# Patient Record
Sex: MALE | Race: WHITE | NOT HISPANIC OR LATINO | URBAN - METROPOLITAN AREA
[De-identification: names, ages, dates, MRNs, and addresses within clinical notes are randomized per-mention and may not be internally consistent; named-entity substitution may affect disease eponyms.]

---

## 2017-04-17 ENCOUNTER — EMERGENCY (EMERGENCY)
Facility: HOSPITAL | Age: 77
LOS: 1 days | Discharge: LEFT BEFORE DISCHARGE | End: 2017-04-17
Attending: EMERGENCY MEDICINE | Admitting: EMERGENCY MEDICINE
Payer: MEDICARE

## 2017-04-17 VITALS
RESPIRATION RATE: 16 BRPM | OXYGEN SATURATION: 97 % | WEIGHT: 197.98 LBS | TEMPERATURE: 98 F | SYSTOLIC BLOOD PRESSURE: 127 MMHG | HEART RATE: 88 BPM | DIASTOLIC BLOOD PRESSURE: 89 MMHG

## 2017-04-17 DIAGNOSIS — E78.5 HYPERLIPIDEMIA, UNSPECIFIED: ICD-10-CM

## 2017-04-17 DIAGNOSIS — R11.2 NAUSEA WITH VOMITING, UNSPECIFIED: ICD-10-CM

## 2017-04-17 DIAGNOSIS — Z79.82 LONG TERM (CURRENT) USE OF ASPIRIN: ICD-10-CM

## 2017-04-17 DIAGNOSIS — Z95.5 PRESENCE OF CORONARY ANGIOPLASTY IMPLANT AND GRAFT: Chronic | ICD-10-CM

## 2017-04-17 DIAGNOSIS — Z98.89 OTHER SPECIFIED POSTPROCEDURAL STATES: Chronic | ICD-10-CM

## 2017-04-17 DIAGNOSIS — Z79.899 OTHER LONG TERM (CURRENT) DRUG THERAPY: ICD-10-CM

## 2017-04-17 DIAGNOSIS — Z79.2 LONG TERM (CURRENT) USE OF ANTIBIOTICS: ICD-10-CM

## 2017-04-17 DIAGNOSIS — K52.9 NONINFECTIVE GASTROENTERITIS AND COLITIS, UNSPECIFIED: ICD-10-CM

## 2017-04-17 DIAGNOSIS — I25.10 ATHEROSCLEROTIC HEART DISEASE OF NATIVE CORONARY ARTERY WITHOUT ANGINA PECTORIS: ICD-10-CM

## 2017-04-17 DIAGNOSIS — E11.9 TYPE 2 DIABETES MELLITUS WITHOUT COMPLICATIONS: ICD-10-CM

## 2017-04-17 DIAGNOSIS — I10 ESSENTIAL (PRIMARY) HYPERTENSION: ICD-10-CM

## 2017-04-17 PROCEDURE — 93010 ELECTROCARDIOGRAM REPORT: CPT

## 2017-04-17 PROCEDURE — 93005 ELECTROCARDIOGRAM TRACING: CPT

## 2017-04-17 PROCEDURE — 99283 EMERGENCY DEPT VISIT LOW MDM: CPT | Mod: 25

## 2017-04-17 PROCEDURE — 99284 EMERGENCY DEPT VISIT MOD MDM: CPT | Mod: 25

## 2017-04-17 NOTE — ED PROVIDER NOTE - OBJECTIVE STATEMENT
here with dark black stool for the past 4 days.  Had episode of nausea/vomiting that was also black when symptoms started. Denies pain, fever/chills, sob, weakness.  Takes plavix daily.  Went to another hospital 2 d ago and had blood tests and was told his "hemoglobin was stable."  They wanted to admit him but he declined.  Talked to his doctor and was referred here to see Dr. Ferrara.

## 2017-04-17 NOTE — ED ADULT TRIAGE NOTE - CHIEF COMPLAINT QUOTE
Pt referred to ED by Dr Ferrara c/o dark tarry stools starting Friday, went to [facility] ER where his H & H was stable and pt was DCed. Currently denies any sob or dizziness, reports last BM this morning. Pt is on Plavix , s/p PCI in 2015.

## 2017-04-17 NOTE — ED PROVIDER NOTE - PROGRESS NOTE DETAILS
refusing lab tests.  states "this is not what I signed up for."  Thought he was going to see Dr. Ferrara in the ER and have colonscopy/endoscopy now as an outpatient procedure.  Does not want to be admitted or have repeat blood work done or rectal exam.  States he wants to go out to the waiting room to call his doctor/ Dr. Ferrara. refusing lab tests.  states "this is not what I signed up for."  Thought he was going to see Dr. Ferrara in the ER and have colonscopy/endoscopy now as an outpatient procedure.  Does not want to be admitted or have repeat blood work done or rectal exam.  States he wants to go out to the waiting room to call his doctor/ Dr. Ferrara.  Discussed that GI bleeding is potentially life threatening and without labs/ further testing, unable to state if he has significant bleeding today.  States he understands but needs to talk to his doctor. did not return from waiting room

## 2017-04-17 NOTE — ED ADULT NURSE NOTE - CHIEF COMPLAINT QUOTE
Pt referred to ED by Dr Ferrara c/o dark tarry stools starting Friday, went to [facility] ER where his H & H was stable and pt was DCed. Currently denies any sob or dizziness, reports last BM this morning. Pt is on Plavix , s/p PCI in 2015.

## 2017-04-17 NOTE — ED PROVIDER NOTE - PMH
BPH (benign prostatic hypertrophy)    CAD (coronary artery disease)    Diabetes    Gout    HTN (hypertension)    Hyperlipidemia

## 2017-04-17 NOTE — ED ADULT NURSE REASSESSMENT NOTE - NS ED NURSE REASSESS COMMENT FT1
In to evaluate patient. Patient with provider being evaluated. Requesting to call PCP prior to labs draw or any further care. Reported to DIPIKA Nunez.

## 2018-05-17 ENCOUNTER — LABORATORY RESULT (OUTPATIENT)
Age: 78
End: 2018-05-17

## 2018-05-17 ENCOUNTER — APPOINTMENT (OUTPATIENT)
Dept: UROLOGY | Facility: CLINIC | Age: 78
End: 2018-05-17
Payer: MEDICARE

## 2018-05-17 VITALS — DIASTOLIC BLOOD PRESSURE: 69 MMHG | TEMPERATURE: 98.5 F | SYSTOLIC BLOOD PRESSURE: 104 MMHG | HEART RATE: 98 BPM

## 2018-05-17 PROCEDURE — 99204 OFFICE O/P NEW MOD 45 MIN: CPT | Mod: 25

## 2018-05-17 PROCEDURE — 51798 US URINE CAPACITY MEASURE: CPT

## 2018-05-17 RX ORDER — ALLOPURINOL 300 MG/1
300 TABLET ORAL
Qty: 90 | Refills: 0 | Status: ACTIVE | COMMUNITY
Start: 2018-04-12

## 2018-05-17 RX ORDER — FINASTERIDE 5 MG/1
5 TABLET, FILM COATED ORAL
Qty: 30 | Refills: 0 | Status: ACTIVE | COMMUNITY
Start: 2018-05-08

## 2018-05-21 ENCOUNTER — FORM ENCOUNTER (OUTPATIENT)
Age: 78
End: 2018-05-21

## 2018-05-22 ENCOUNTER — OUTPATIENT (OUTPATIENT)
Dept: OUTPATIENT SERVICES | Facility: HOSPITAL | Age: 78
LOS: 1 days | End: 2018-05-22
Payer: MEDICARE

## 2018-05-22 DIAGNOSIS — Z98.89 OTHER SPECIFIED POSTPROCEDURAL STATES: Chronic | ICD-10-CM

## 2018-05-22 DIAGNOSIS — Z95.5 PRESENCE OF CORONARY ANGIOPLASTY IMPLANT AND GRAFT: Chronic | ICD-10-CM

## 2018-05-22 PROCEDURE — 76870 US EXAM SCROTUM: CPT

## 2018-05-22 PROCEDURE — 76870 US EXAM SCROTUM: CPT | Mod: 26

## 2018-05-29 ENCOUNTER — APPOINTMENT (OUTPATIENT)
Dept: ULTRASOUND IMAGING | Facility: HOSPITAL | Age: 78
End: 2018-05-29

## 2018-06-15 ENCOUNTER — APPOINTMENT (OUTPATIENT)
Dept: UROLOGY | Facility: CLINIC | Age: 78
End: 2018-06-15

## 2018-09-06 ENCOUNTER — EMERGENCY (EMERGENCY)
Facility: HOSPITAL | Age: 78
LOS: 1 days | Discharge: HOME | End: 2018-09-06
Attending: EMERGENCY MEDICINE | Admitting: EMERGENCY MEDICINE
Payer: MEDICARE

## 2018-09-06 VITALS
HEART RATE: 71 BPM | OXYGEN SATURATION: 97 % | SYSTOLIC BLOOD PRESSURE: 122 MMHG | WEIGHT: 179.9 LBS | RESPIRATION RATE: 18 BRPM | TEMPERATURE: 98 F | DIASTOLIC BLOOD PRESSURE: 85 MMHG

## 2018-09-06 DIAGNOSIS — S09.90XA UNSPECIFIED INJURY OF HEAD, INITIAL ENCOUNTER: ICD-10-CM
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
## 2018-09-06 DIAGNOSIS — Z79.899 OTHER LONG TERM (CURRENT) DRUG THERAPY: ICD-10-CM
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
## 2018-09-06 DIAGNOSIS — I25.10 ATHEROSCLEROTIC HEART DISEASE OF NATIVE CORONARY ARTERY WITHOUT ANGINA PECTORIS: ICD-10-CM

## 2018-09-06 DIAGNOSIS — S01.81XA LACERATION WITHOUT FOREIGN BODY OF OTHER PART OF HEAD, INITIAL ENCOUNTER: ICD-10-CM

## 2018-09-06 DIAGNOSIS — I10 ESSENTIAL (PRIMARY) HYPERTENSION: ICD-10-CM

## 2018-09-06 DIAGNOSIS — Z95.5 PRESENCE OF CORONARY ANGIOPLASTY IMPLANT AND GRAFT: Chronic | ICD-10-CM

## 2018-09-06 DIAGNOSIS — Z79.84 LONG TERM (CURRENT) USE OF ORAL HYPOGLYCEMIC DRUGS: ICD-10-CM

## 2018-09-06 DIAGNOSIS — E11.9 TYPE 2 DIABETES MELLITUS WITHOUT COMPLICATIONS: ICD-10-CM

## 2018-09-06 DIAGNOSIS — Z98.89 OTHER SPECIFIED POSTPROCEDURAL STATES: Chronic | ICD-10-CM
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
## 2018-09-06 DIAGNOSIS — E78.5 HYPERLIPIDEMIA, UNSPECIFIED: ICD-10-CM

## 2018-09-06 LAB
ALBUMIN SERPL ELPH-MCNC: 4.3 G/DL — SIGNIFICANT CHANGE UP (ref 3.3–5)
ALP SERPL-CCNC: 92 U/L — SIGNIFICANT CHANGE UP (ref 40–120)
ALT FLD-CCNC: 16 U/L — SIGNIFICANT CHANGE UP (ref 10–45)
ANION GAP SERPL CALC-SCNC: 11 MMOL/L — SIGNIFICANT CHANGE UP (ref 5–17)
APTT BLD: 28.9 SEC — SIGNIFICANT CHANGE UP (ref 27.5–37.4)
AST SERPL-CCNC: 22 U/L — SIGNIFICANT CHANGE UP (ref 10–40)
BASOPHILS NFR BLD AUTO: 0.2 % — SIGNIFICANT CHANGE UP (ref 0–2)
BILIRUB SERPL-MCNC: 0.7 MG/DL — SIGNIFICANT CHANGE UP (ref 0.2–1.2)
BUN SERPL-MCNC: 31 MG/DL — HIGH (ref 7–23)
CALCIUM SERPL-MCNC: 11.2 MG/DL — HIGH (ref 8.4–10.5)
CHLORIDE SERPL-SCNC: 100 MMOL/L — SIGNIFICANT CHANGE UP (ref 96–108)
CO2 SERPL-SCNC: 29 MMOL/L — SIGNIFICANT CHANGE UP (ref 22–31)
CREAT SERPL-MCNC: 1.85 MG/DL — HIGH (ref 0.5–1.3)
EOSINOPHIL NFR BLD AUTO: 1.5 % — SIGNIFICANT CHANGE UP (ref 0–6)
GLUCOSE SERPL-MCNC: 124 MG/DL — HIGH (ref 70–99)
HCT VFR BLD CALC: 42.1 % — SIGNIFICANT CHANGE UP (ref 39–50)
HGB BLD-MCNC: 14.4 G/DL — SIGNIFICANT CHANGE UP (ref 13–17)
INR BLD: 1.02 — SIGNIFICANT CHANGE UP (ref 0.88–1.16)
LYMPHOCYTES # BLD AUTO: 6.6 % — LOW (ref 13–44)
MCHC RBC-ENTMCNC: 30.1 PG — SIGNIFICANT CHANGE UP (ref 27–34)
MCHC RBC-ENTMCNC: 34.2 G/DL — SIGNIFICANT CHANGE UP (ref 32–36)
MCV RBC AUTO: 88.1 FL — SIGNIFICANT CHANGE UP (ref 80–100)
MONOCYTES NFR BLD AUTO: 6.1 % — SIGNIFICANT CHANGE UP (ref 2–14)
NEUTROPHILS NFR BLD AUTO: 85.6 % — HIGH (ref 43–77)
PLATELET # BLD AUTO: 234 K/UL — SIGNIFICANT CHANGE UP (ref 150–400)
POTASSIUM SERPL-MCNC: 4.1 MMOL/L — SIGNIFICANT CHANGE UP (ref 3.5–5.3)
POTASSIUM SERPL-SCNC: 4.1 MMOL/L — SIGNIFICANT CHANGE UP (ref 3.5–5.3)
PROT SERPL-MCNC: 7.1 G/DL — SIGNIFICANT CHANGE UP (ref 6–8.3)
PROTHROM AB SERPL-ACNC: 11.3 SEC — SIGNIFICANT CHANGE UP (ref 9.8–12.7)
RBC # BLD: 4.78 M/UL — SIGNIFICANT CHANGE UP (ref 4.2–5.8)
RBC # FLD: 14.9 % — SIGNIFICANT CHANGE UP (ref 10.3–16.9)
SODIUM SERPL-SCNC: 140 MMOL/L — SIGNIFICANT CHANGE UP (ref 135–145)
WBC # BLD: 12 K/UL — HIGH (ref 3.8–10.5)
WBC # FLD AUTO: 12 K/UL — HIGH (ref 3.8–10.5)

## 2018-09-06 PROCEDURE — 99284 EMERGENCY DEPT VISIT MOD MDM: CPT | Mod: 25

## 2018-09-06 PROCEDURE — 12011 RPR F/E/E/N/L/M 2.5 CM/<: CPT

## 2018-09-06 PROCEDURE — 85610 PROTHROMBIN TIME: CPT

## 2018-09-06 PROCEDURE — 70450 CT HEAD/BRAIN W/O DYE: CPT | Mod: 26

## 2018-09-06 PROCEDURE — 80053 COMPREHEN METABOLIC PANEL: CPT

## 2018-09-06 PROCEDURE — 85730 THROMBOPLASTIN TIME PARTIAL: CPT

## 2018-09-06 PROCEDURE — G0168: CPT

## 2018-09-06 PROCEDURE — 85025 COMPLETE CBC W/AUTO DIFF WBC: CPT

## 2018-09-06 PROCEDURE — 70480 CT ORBIT/EAR/FOSSA W/O DYE: CPT

## 2018-09-06 PROCEDURE — 70450 CT HEAD/BRAIN W/O DYE: CPT

## 2018-09-06 PROCEDURE — 70480 CT ORBIT/EAR/FOSSA W/O DYE: CPT | Mod: 26,59

## 2018-09-06 RX ORDER — ACETAMINOPHEN 500 MG
975 TABLET ORAL ONCE
Qty: 0 | Refills: 0 | Status: COMPLETED | OUTPATIENT
Start: 2018-09-06 | End: 2018-09-06

## 2018-09-06 RX ADMIN — Medication 975 MILLIGRAM(S): at 16:30

## 2018-09-06 NOTE — ED PROVIDER NOTE - MEDICAL DECISION MAKING DETAILS
head injury s/p mechanical fall. neuro exam intact. VSS. pt well appearing. ct head and orbits negative. labs noted. creatinine unchanged from previous. f/u with pmd. lac repaired with dermabond. wound care d/w pt.

## 2018-09-06 NOTE — ED PROVIDER NOTE - CARE PLAN
Principal Discharge DX:	Head injury  Secondary Diagnosis:	Laceration of forehead Right Hand/Right Foot

## 2018-09-06 NOTE — ED ADULT NURSE NOTE - CHPI ED NUR SYMPTOMS NEG
no confusion/no blurred vision/no syncope/no weakness/no loss of consciousness/no nausea/no seizure/no vomiting/no dizziness/no change in level of consciousness

## 2018-09-06 NOTE — ED ADULT NURSE NOTE - CAS EDN DISCHARGE ASSESSMENT
Please advise in regards to refill request. Thank You    Refill Protocol Appointment Criteria  · Appointment scheduled in the past 6 months or in the next 3 months  Recent Outpatient Visits            4 months [place]     [id number] [place] to person, place and time

## 2018-09-06 NOTE — ED PROVIDER NOTE - OBJECTIVE STATEMENT
79 y/o male with hx of CAD, HTN, Diabetes c/o head injury. pt states tripped and fell striking head on sidewalk 1 hr prior to arrival. no loc, ha, dizziness or weakness. no visual changes. no cp, sob, abd pain, n/v. no further complaints. Pt reports on blood thinner but unable to remember which one.

## 2018-09-06 NOTE — ED ADULT NURSE NOTE - OBJECTIVE STATEMENT
77 y/o BIBA A&OX3 s/p mechanical trip and fall on sidewalk. Pt has ecchymosis and lac to R eyebrow, no other visible injury. Cervical spine nontender upon palpation. Pt denies pain at this time, denies syncopal episode, denies n/v, denies headache/feeling dizzy. Pt states "I think I take a blood thinner" but is unsure what one, reports hx of cardiac stent "many many years ago." Unknown last tetanus although pt states "I will not take a tetanus shot today." Pt on the phone in NAD, denies complaints, will continue to monitor closely.

## 2018-09-06 NOTE — ED PROVIDER NOTE - ATTENDING CONTRIBUTION TO CARE
Pt is an 79yo m, h/o htn, dm, cad, who presents s/p trip and fall, hitting head on ground. No loc. No ha, dizziness, neck pain, n/t/w, visual changes, n/v... + r forehead lac. Last td unknown and is declining td booster today. No cp, sob, palp. Afebrile, hds. WNWD m in nad. Nc/at head. + superficial 0.5cm lac to r forehead. + mild ttp to r supraorbital region, no bony step-offs. No c-t-l spine tenderness. + s1, s2, rrr. Lungs cta b/l. Abd soft, nt/nd, no guarding/ rebound. No extremity tenderness. Neuro exam non-focal. CT head and orbits neg for acute injury. + baseline cri. Wound repaired with dermabond. Test results d/w pt who was advised on wound care and f/u with pcp for re-evaluation.

## 2018-09-06 NOTE — ED ADULT NURSE NOTE - NSIMPLEMENTINTERV_GEN_ALL_ED
Implemented All Fall with Harm Risk Interventions:  [place] to call system. Call bell, personal items and telephone within reach. Instruct patient to call for assistance. Room bathroom lighting operational. Non-slip footwear when patient is off stretcher. Physically safe environment: no spills, clutter or unnecessary equipment. Stretcher in lowest position, wheels locked, appropriate side rails in place. Provide visual cue, wrist band, yellow gown, etc. Monitor gait and stability. Monitor for mental status changes and reorient to person, place, and time. Review medications for side effects contributing to fall risk. Reinforce activity limits and safety measures with patient and family. Provide visual clues: red socks.

## 2018-09-06 NOTE — ED ADULT TRIAGE NOTE - CHIEF COMPLAINT QUOTE
patient BIBA from street s/p trip and fall hit head. patient takes blood thinners but in unsure of the name. no LOC, denies dizziness, blurry vision, chest pain, complains of slight headache. ambulatory with steady gait. laceration and hematoma above right eyebrow. bleeding controlled.

## 2018-09-06 NOTE — ED PROVIDER NOTE - EYES, MLM
Clear bilaterally, pupils equal, round and reactive to light. mild bruising to right periorbital region. mild edema. no bony tenderness. EOMI

## 2018-09-06 NOTE — ED PROVIDER NOTE - SKIN, MLM
Skin normal color for race, warm, dry and intact. No evidence of rash. 0.5 cm superificial lac to right foreahead.

## 2020-11-11 ENCOUNTER — EMERGENCY (EMERGENCY)
Facility: HOSPITAL | Age: 80
LOS: 1 days | Discharge: HOME | End: 2020-11-11
Attending: EMERGENCY MEDICINE | Admitting: EMERGENCY MEDICINE
Payer: MEDICARE

## 2020-11-11 VITALS
OXYGEN SATURATION: 99 % | HEART RATE: 69 BPM | TEMPERATURE: 98 F | RESPIRATION RATE: 189 BRPM | DIASTOLIC BLOOD PRESSURE: 65 MMHG | SYSTOLIC BLOOD PRESSURE: 135 MMHG

## 2020-11-11 VITALS
HEART RATE: 88 BPM | HEIGHT: 68 IN | WEIGHT: 179.9 LBS | TEMPERATURE: 98 F | SYSTOLIC BLOOD PRESSURE: 147 MMHG | RESPIRATION RATE: 19 BRPM | OXYGEN SATURATION: 98 % | DIASTOLIC BLOOD PRESSURE: 75 MMHG

## 2020-11-11 DIAGNOSIS — M54.2 CERVICALGIA: ICD-10-CM

## 2020-11-11 DIAGNOSIS — Z88.8 ALLERGY STATUS TO OTHER DRUGS, MEDICAMENTS AND BIOLOGICAL SUBSTANCES STATUS: ICD-10-CM

## 2020-11-11 DIAGNOSIS — E11.9 TYPE 2 DIABETES MELLITUS WITHOUT COMPLICATIONS: ICD-10-CM

## 2020-11-11 DIAGNOSIS — Z98.89 OTHER SPECIFIED POSTPROCEDURAL STATES: Chronic | ICD-10-CM

## 2020-11-11 DIAGNOSIS — I10 ESSENTIAL (PRIMARY) HYPERTENSION: ICD-10-CM

## 2020-11-11 DIAGNOSIS — Z95.5 PRESENCE OF CORONARY ANGIOPLASTY IMPLANT AND GRAFT: Chronic | ICD-10-CM

## 2020-11-11 DIAGNOSIS — Z79.4 LONG TERM (CURRENT) USE OF INSULIN: ICD-10-CM

## 2020-11-11 DIAGNOSIS — Z79.899 OTHER LONG TERM (CURRENT) DRUG THERAPY: ICD-10-CM

## 2020-11-11 PROCEDURE — 99284 EMERGENCY DEPT VISIT MOD MDM: CPT | Mod: 25

## 2020-11-11 PROCEDURE — 96361 HYDRATE IV INFUSION ADD-ON: CPT

## 2020-11-11 PROCEDURE — 99283 EMERGENCY DEPT VISIT LOW MDM: CPT

## 2020-11-11 PROCEDURE — 99284 EMERGENCY DEPT VISIT MOD MDM: CPT

## 2020-11-11 PROCEDURE — 96374 THER/PROPH/DIAG INJ IV PUSH: CPT

## 2020-11-11 RX ORDER — MORPHINE SULFATE 50 MG/1
4 CAPSULE, EXTENDED RELEASE ORAL ONCE
Refills: 0 | Status: DISCONTINUED | OUTPATIENT
Start: 2020-11-11 | End: 2020-11-11

## 2020-11-11 RX ORDER — SODIUM CHLORIDE 9 MG/ML
1000 INJECTION INTRAMUSCULAR; INTRAVENOUS; SUBCUTANEOUS
Refills: 0 | Status: DISCONTINUED | OUTPATIENT
Start: 2020-11-11 | End: 2020-11-14

## 2020-11-11 RX ORDER — OXYCODONE AND ACETAMINOPHEN 5; 325 MG/1; MG/1
2 TABLET ORAL ONCE
Refills: 0 | Status: DISCONTINUED | OUTPATIENT
Start: 2020-11-11 | End: 2020-11-11

## 2020-11-11 RX ADMIN — OXYCODONE AND ACETAMINOPHEN 2 TABLET(S): 5; 325 TABLET ORAL at 04:45

## 2020-11-11 RX ADMIN — SODIUM CHLORIDE 1000 MILLILITER(S): 9 INJECTION INTRAMUSCULAR; INTRAVENOUS; SUBCUTANEOUS at 04:55

## 2020-11-11 RX ADMIN — MORPHINE SULFATE 4 MILLIGRAM(S): 50 CAPSULE, EXTENDED RELEASE ORAL at 03:23

## 2020-11-11 RX ADMIN — OXYCODONE AND ACETAMINOPHEN 2 TABLET(S): 5; 325 TABLET ORAL at 04:55

## 2020-11-11 RX ADMIN — SODIUM CHLORIDE 100 MILLILITER(S): 9 INJECTION INTRAMUSCULAR; INTRAVENOUS; SUBCUTANEOUS at 03:09

## 2020-11-11 RX ADMIN — Medication 60 MILLIGRAM(S): at 03:10

## 2020-11-11 RX ADMIN — MORPHINE SULFATE 4 MILLIGRAM(S): 50 CAPSULE, EXTENDED RELEASE ORAL at 03:08

## 2020-11-11 NOTE — ED ADULT NURSE NOTE - CHPI ED NUR SYMPTOMS NEG
no fever/no loss of consciousness/no nausea/no vomiting/no weakness/no dizziness/no confusion/no blurred vision/no numbness

## 2020-11-11 NOTE — ED PROVIDER NOTE - MUSCULOSKELETAL, MLM
Spine appears normal, range of motion is not limited, + tender spasming neck muscles BL C5-6-7 5/5 BL

## 2020-11-11 NOTE — ED PROVIDER NOTE - PATIENT PORTAL LINK FT
You can access the FollowMyHealth Patient Portal offered by [facility] by registering at the following website: [email or web address]. By joining [employer]’s FollowMyHealth portal, you will also be able to view your health information using other applications (apps) compatible with our system.

## 2020-11-11 NOTE — ED PROVIDER NOTE - NSFOLLOWUPINSTRUCTIONS_ED_ALL_ED_FT
Dr Gross- [phone]    Dr Dupree - [phone]      ACUTE NECK PAIN - Ambulatory Care           Acute Neck Pain    AMBULATORY CARE:    Acute neck pain starts suddenly, increases quickly, and goes away in a few days. The pain may come and go, or be worse with certain movements. The pain may be only in your neck, or it may move to your arms, back, or shoulders. You may also have pain that starts in another body area and moves to your neck.     Vertebral Column         Seek care immediately if:   •You have an injury that causes neck pain and shooting pain down your arms or legs.      •Your neck pain suddenly becomes severe.      •You have neck pain along with numbness, tingling, or weakness in your arms or legs.      •You have a stiff neck, a headache, and a fever.      Contact your healthcare provider if:   •You have new or worsening symptoms.      •Your symptoms continue even after treatment.      •You have questions or concerns about your condition or care.      Treatment may include any of the following, depending on what is causing your pain:   •Medicines may be prescribed or recommended by your healthcare provider for pain. You may need medicine to treat nerve pain or to stop muscle spasms. Medicines may also be given to reduce inflammation. Your healthcare provider may inject medicine into a nerve to block pain. Over-the-counter NSAID medicine or acetaminophen may be recommended to help treat minor pain or inflammation.      •Traction is used to relieve pressure from nerves. Your head is gently pulled up and away from your neck. This stretches muscles and ligaments and makes more room for the spine. Your healthcare provider will tell you the kind of traction that will help your neck pain. Do not use traction devices at home unless directed by your healthcare provider.      Manage or prevent acute neck pain:   •Rest your neck as directed. Do not make sudden movements, such as turning your head quickly. Your healthcare provider may recommend you wear a cervical collar for a short time. The collar will prevent you from moving your head. This will give your neck time to heal if an injury is causing your neck pain. Ask your healthcare provider when you can return to sports or other normal daily activities.      •Apply heat as directed. Heat helps relieve pain and swelling. Use a heat wrap, or soak a small towel in warm water. Wring out the extra water. Apply the heat wrap or towel for 20 minutes every hour, or as directed.      •Apply ice as directed. Ice helps relieve pain and swelling, and can help prevent tissue damage. Use an ice pack, or put ice in a bag. Cover the ice pack or back with a towel before you apply it to your neck. Apply the ice pack or ice for 15 minutes every hour, or as directed. Your healthcare provider can tell you how often to apply ice.      •Do neck exercises as directed. Neck exercises help strengthen the muscles and increase range of motion. Your healthcare provider will tell you which exercises are right for you. He may give you instructions, or he may recommend that you work with a physical therapist. Your healthcare provider or therapist can make sure you are doing the exercises correctly.       •Maintain good posture. Try to keep your head and shoulders lifted when you sit. If you work in front of a computer, make sure the monitor is at the right level. You should not need to look up down to see the screen. You should also not have to lean forward to be able to read what is on the screen. Make sure your keyboard, mouse, and other computer items are placed where you do not have to extend your shoulder to reach them. Get up often if you work in front of a computer or sit for long periods of time. Stretch or walk around to keep your neck muscles loose.      Follow up with your healthcare provider as directed: Your healthcare provider may refer you to a specialist if your pain does not get better with treatment. Write down your questions so you remember to ask them during your visits.       © Copyright [employer] 2020           back to top                          © Copyright [employer] 2020

## 2020-11-11 NOTE — ED PROVIDER NOTE - CPE EDP EYES NORM
Check for fevers, chest pain, trouble breathing, abd pain, or any new or concerning symptoms. If you experience any of these symptoms, you need to call your doctor or return immediately to the Emergency Room.    Call your doctor to be seen in the next 24 to 48 hours.    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     normal...

## 2020-11-11 NOTE — ED PROVIDER NOTE - OBJECTIVE STATEMENT
80 M co neck pain- BL neck spasms- pain w turning the head x 2 days- called md and was given valium- taking 5 mg with min relief- no trauma  had lower back surgery 15 weeks ago- had mri of entire spine at that time  no f/c no n/v  no radiatoin of pain no weakness  mod-severe pain

## 2020-11-11 NOTE — ED ADULT NURSE NOTE - OBJECTIVE STATEMENT
pt to ED for neck pain radiating to [place], head started monday. pt took valium prescribed by PCP yesterday, however no relief. pt took 5/325 percocet daily for back pain, didn't take today. denies falls/trauma/injury/heavy lifting, denies numbness, tingling, unilateral weakness. pt is able to ambulate, states he drove here by himself. motor power +4

## 2020-11-11 NOTE — ED ADULT TRIAGE NOTE - CHIEF COMPLAINT QUOTE
c/o neck, shoulder, head pain since monday. pt took valium and used hot pack @2100 without relief. denies falls/ trauma. denies numbness/ tingling. pt able to ambulate.

## 2021-12-01 ENCOUNTER — OUTPATIENT (OUTPATIENT)
Dept: OUTPATIENT SERVICES | Facility: HOSPITAL | Age: 81
LOS: 1 days | End: 2021-12-01
Payer: MEDICARE

## 2021-12-01 ENCOUNTER — RESULT REVIEW (OUTPATIENT)
Age: 81
End: 2021-12-01

## 2021-12-01 DIAGNOSIS — Z98.89 OTHER SPECIFIED POSTPROCEDURAL STATES: Chronic | ICD-10-CM

## 2021-12-01 DIAGNOSIS — R06.09 OTHER FORMS OF DYSPNEA: ICD-10-CM

## 2021-12-01 DIAGNOSIS — E11.9 TYPE 2 DIABETES MELLITUS WITHOUT COMPLICATIONS: ICD-10-CM

## 2021-12-01 DIAGNOSIS — I25.10 ATHEROSCLEROTIC HEART DISEASE OF NATIVE CORONARY ARTERY WITHOUT ANGINA PECTORIS: ICD-10-CM

## 2021-12-01 DIAGNOSIS — E78.5 HYPERLIPIDEMIA, UNSPECIFIED: ICD-10-CM

## 2021-12-01 DIAGNOSIS — Z95.5 PRESENCE OF CORONARY ANGIOPLASTY IMPLANT AND GRAFT: Chronic | ICD-10-CM

## 2021-12-01 LAB — GLUCOSE BLDC GLUCOMTR-MCNC: 123 MG/DL — HIGH (ref 70–99)

## 2021-12-01 PROCEDURE — A9505: CPT

## 2021-12-01 PROCEDURE — 93018 CV STRESS TEST I&R ONLY: CPT

## 2021-12-01 PROCEDURE — 93016 CV STRESS TEST SUPVJ ONLY: CPT

## 2021-12-01 PROCEDURE — 78452 HT MUSCLE IMAGE SPECT MULT: CPT

## 2021-12-01 PROCEDURE — 78452 HT MUSCLE IMAGE SPECT MULT: CPT | Mod: 26

## 2021-12-01 PROCEDURE — 93017 CV STRESS TEST TRACING ONLY: CPT

## 2021-12-01 PROCEDURE — 82962 GLUCOSE BLOOD TEST: CPT

## 2021-12-01 PROCEDURE — A9500: CPT

## 2022-04-19 ENCOUNTER — EMERGENCY (EMERGENCY)
Facility: HOSPITAL | Age: 82
LOS: 1 days | Discharge: HOME | End: 2022-04-19
Attending: EMERGENCY MEDICINE | Admitting: EMERGENCY MEDICINE
Payer: MEDICARE

## 2022-04-19 VITALS
HEART RATE: 61 BPM | DIASTOLIC BLOOD PRESSURE: 68 MMHG | RESPIRATION RATE: 17 BRPM | HEIGHT: 68 IN | OXYGEN SATURATION: 99 % | SYSTOLIC BLOOD PRESSURE: 107 MMHG | TEMPERATURE: 98 F | WEIGHT: 184.97 LBS

## 2022-04-19 VITALS
OXYGEN SATURATION: 97 % | TEMPERATURE: 98 F | SYSTOLIC BLOOD PRESSURE: 121 MMHG | RESPIRATION RATE: 17 BRPM | HEART RATE: 56 BPM | DIASTOLIC BLOOD PRESSURE: 64 MMHG

## 2022-04-19 DIAGNOSIS — Z98.89 OTHER SPECIFIED POSTPROCEDURAL STATES: Chronic | ICD-10-CM

## 2022-04-19 DIAGNOSIS — Z95.5 PRESENCE OF CORONARY ANGIOPLASTY IMPLANT AND GRAFT: Chronic | ICD-10-CM

## 2022-04-19 LAB
ANION GAP SERPL CALC-SCNC: 10 MMOL/L — SIGNIFICANT CHANGE UP (ref 5–17)
BASOPHILS # BLD AUTO: 0.05 K/UL — SIGNIFICANT CHANGE UP (ref 0–0.2)
BASOPHILS NFR BLD AUTO: 0.6 % — SIGNIFICANT CHANGE UP (ref 0–2)
BUN SERPL-MCNC: 38 MG/DL — HIGH (ref 7–23)
CALCIUM SERPL-MCNC: 10.4 MG/DL — SIGNIFICANT CHANGE UP (ref 8.4–10.5)
CHLORIDE SERPL-SCNC: 106 MMOL/L — SIGNIFICANT CHANGE UP (ref 96–108)
CO2 SERPL-SCNC: 24 MMOL/L — SIGNIFICANT CHANGE UP (ref 22–31)
CREAT SERPL-MCNC: 1.83 MG/DL — HIGH (ref 0.5–1.3)
EGFR: 36 ML/MIN/1.73M2 — LOW
EOSINOPHIL # BLD AUTO: 0.37 K/UL — SIGNIFICANT CHANGE UP (ref 0–0.5)
EOSINOPHIL NFR BLD AUTO: 4.7 % — SIGNIFICANT CHANGE UP (ref 0–6)
GLUCOSE SERPL-MCNC: 66 MG/DL — LOW (ref 70–99)
HCT VFR BLD CALC: 42.1 % — SIGNIFICANT CHANGE UP (ref 39–50)
HGB BLD-MCNC: 13.6 G/DL — SIGNIFICANT CHANGE UP (ref 13–17)
IMM GRANULOCYTES NFR BLD AUTO: 0.3 % — SIGNIFICANT CHANGE UP (ref 0–1.5)
LYMPHOCYTES # BLD AUTO: 1.52 K/UL — SIGNIFICANT CHANGE UP (ref 1–3.3)
LYMPHOCYTES # BLD AUTO: 19.4 % — SIGNIFICANT CHANGE UP (ref 13–44)
MCHC RBC-ENTMCNC: 29.8 PG — SIGNIFICANT CHANGE UP (ref 27–34)
MCHC RBC-ENTMCNC: 32.3 GM/DL — SIGNIFICANT CHANGE UP (ref 32–36)
MCV RBC AUTO: 92.3 FL — SIGNIFICANT CHANGE UP (ref 80–100)
MONOCYTES # BLD AUTO: 0.83 K/UL — SIGNIFICANT CHANGE UP (ref 0–0.9)
MONOCYTES NFR BLD AUTO: 10.6 % — SIGNIFICANT CHANGE UP (ref 2–14)
NEUTROPHILS # BLD AUTO: 5.03 K/UL — SIGNIFICANT CHANGE UP (ref 1.8–7.4)
NEUTROPHILS NFR BLD AUTO: 64.4 % — SIGNIFICANT CHANGE UP (ref 43–77)
NRBC # BLD: 0 /100 WBCS — SIGNIFICANT CHANGE UP (ref 0–0)
NT-PROBNP SERPL-SCNC: 553 PG/ML — HIGH (ref 0–300)
PLATELET # BLD AUTO: 245 K/UL — SIGNIFICANT CHANGE UP (ref 150–400)
POTASSIUM SERPL-MCNC: 4.4 MMOL/L — SIGNIFICANT CHANGE UP (ref 3.5–5.3)
POTASSIUM SERPL-SCNC: 4.4 MMOL/L — SIGNIFICANT CHANGE UP (ref 3.5–5.3)
RBC # BLD: 4.56 M/UL — SIGNIFICANT CHANGE UP (ref 4.2–5.8)
RBC # FLD: 15.9 % — HIGH (ref 10.3–14.5)
SARS-COV-2 RNA SPEC QL NAA+PROBE: NEGATIVE — SIGNIFICANT CHANGE UP
SODIUM SERPL-SCNC: 140 MMOL/L — SIGNIFICANT CHANGE UP (ref 135–145)
TROPONIN T SERPL-MCNC: 0.01 NG/ML — SIGNIFICANT CHANGE UP (ref 0–0.01)
WBC # BLD: 7.82 K/UL — SIGNIFICANT CHANGE UP (ref 3.8–10.5)
WBC # FLD AUTO: 7.82 K/UL — SIGNIFICANT CHANGE UP (ref 3.8–10.5)

## 2022-04-19 PROCEDURE — 87635 SARS-COV-2 COVID-19 AMP PRB: CPT

## 2022-04-19 PROCEDURE — 71045 X-RAY EXAM CHEST 1 VIEW: CPT | Mod: 26

## 2022-04-19 PROCEDURE — 80048 BASIC METABOLIC PNL TOTAL CA: CPT

## 2022-04-19 PROCEDURE — 84484 ASSAY OF TROPONIN QUANT: CPT

## 2022-04-19 PROCEDURE — 99285 EMERGENCY DEPT VISIT HI MDM: CPT | Mod: 25

## 2022-04-19 PROCEDURE — 93005 ELECTROCARDIOGRAM TRACING: CPT

## 2022-04-19 PROCEDURE — 36415 COLL VENOUS BLD VENIPUNCTURE: CPT

## 2022-04-19 PROCEDURE — 71045 X-RAY EXAM CHEST 1 VIEW: CPT

## 2022-04-19 PROCEDURE — 85025 COMPLETE CBC W/AUTO DIFF WBC: CPT

## 2022-04-19 PROCEDURE — 83880 ASSAY OF NATRIURETIC PEPTIDE: CPT

## 2022-04-19 PROCEDURE — 93010 ELECTROCARDIOGRAM REPORT: CPT

## 2022-04-19 RX ORDER — APIXABAN 2.5 MG/1
1 TABLET, FILM COATED ORAL
Qty: 60 | Refills: 0
Start: 2022-04-19 | End: 2022-05-18

## 2022-04-19 RX ORDER — APIXABAN 2.5 MG/1
1 TABLET, FILM COATED ORAL
Qty: 28 | Refills: 0
Start: 2022-04-19 | End: 2022-05-02

## 2022-04-19 NOTE — ED PROVIDER NOTE - CLINICAL SUMMARY MEDICAL DECISION MAKING FREE TEXT BOX
82 M pmh htn, hld, dm, CKD, CAD s/p stent on plavix, s/p loop recorder, h/o CVA referred to ED by cardiologist Dr. Geovanna Gurrola for afib noted on loop recorder yesterday, pt w/ occasional lightheadedness over past few months, not exertional and none recently, no cp/sob.  EKG sinus bradycardia rate 59, normotensive, no hypoxia, rest of exam unremarkable.  labs w/ stable CKD, neg trop, bnp 553 (unremarkable for age), CXR no e/i.  d/w Dr. Gurrola pt cardiologist, recommended admission for echo but pt declined and cards okay w/ outpt w/u, in meantime recommend dc plavix and change to eliquis.  pt will f/u outpt w/ cards, EP at [facility] and his neurologist to inform of AC change.  pt stable for dc.  discussed strict return parameters

## 2022-04-19 NOTE — ED PROVIDER NOTE - NSICDXPASTSURGICALHX_GEN_ALL_CORE_FT
PAST SURGICAL HISTORY:  H/O heart artery stent     H/O shoulder surgery     History of back surgery

## 2022-04-19 NOTE — ED ADULT NURSE NOTE - OBJECTIVE STATEMENT
82y male referred to ED by cardiologist after loop recorder detected Afib. Pt has hx of cardiac stents 30 years ago. PMH of HTN, HLD, BPH. Denies chest pain, headache, dizziness, nausea. A&Ox4.

## 2022-04-19 NOTE — ED PROVIDER NOTE - PHYSICAL EXAMINATION
Vitals reviewed  Gen: well appearing, nad, speaking in full sentences  Skin: wwp, no rash/lesions  HEENT: ncat, eomi, mmm  Neck: no JVD   CV: +s1/2, no audible m/r/g  Resp: symmetrical expansion, ctab, no w/r/r  Abd: nondistended, soft/nt  Ext: FROM throughout, no peripheral edema/calf ttp  Neuro: alert/oriented, no focal deficits, steady gait

## 2022-04-19 NOTE — ED PROVIDER NOTE - NSICDXPASTMEDICALHX_GEN_ALL_CORE_FT
PAST MEDICAL HISTORY:  BPH (benign prostatic hypertrophy)     CAD (coronary artery disease)     Diabetes     Gout     HTN (hypertension)     Hyperlipidemia

## 2022-04-19 NOTE — ED PROVIDER NOTE - ATTENDING APP SHARED VISIT CONTRIBUTION OF CARE
83 yo M with PMH of htn, hld, dm, CKD, CAD s/p stent on plavix, s/p loop recorder, h/o CVA referred to ED by cardiologist Dr. Geovanna Gurrola for afib noted on loop recorder yesterday, pt w/ occasional lightheadedness over past few months, not exertional and none recently, no cp/sob. Pt AAO, NAD, RRR, CTA b/l. EKG with sinus bradycardia rate 59, normotensive, no hypoxia.  Labs w/ stable CKD, neg trop, bnp 553 (unremarkable for age), CXR with NAD.  d/w Dr. Gurrola pt cardiologist, recommended admission for echo but pt declined and cards okay w/ outpt w/u, in meantime recommend dc plavix and change to eliquis. Pt will f/u outpt w/ cards, EP at [facility] and his neurologist to inform of AC change.  pt stable for dc. Return precautions given.

## 2022-04-19 NOTE — ED PROVIDER NOTE - NS ED ATTENDING STATEMENT MOD
This was a shared visit with the AVELINA. I reviewed and verified the documentation and independently performed the documented:

## 2022-04-19 NOTE — ED PROVIDER NOTE - OBJECTIVE STATEMENT
82 M pmh htn, hld, dm, CKD, CAD s/p stent on plavix, s/p loop recorder, h/o CVA referred to ED by cardiologist Dr. Geovanna Gurrola for afib noted on loop recorder yesterday.  Pt reports over past few months he occasionally feels lightheaded, not a/w exertion/activity, no lightheadedness recently.  Denies chest pain/sob.  States he got a call from his cardiologist and EP at [facility] that he had episode of afib yesterday and to come to ED.  denies f/c, headache, fainting, uri sxs, abd pain, nvd, leg pain/swelling, trauma

## 2022-04-19 NOTE — ED ADULT NURSE REASSESSMENT NOTE - NS ED NURSE REASSESS COMMENT FT1
Pt using cell phone and not participating in interview/assessment. Breathing spontaneous and unlabored. EKG complete and seen by provider.

## 2022-04-19 NOTE — ED ADULT TRIAGE NOTE - CHIEF COMPLAINT QUOTE
Pt sent by cardiologist, loop recorder showing a fib today. Pt reporting intermittent lightheadedness for the past few weeks. Denies acute complaints. EKG in prog.

## 2022-04-19 NOTE — ED PROVIDER NOTE - PATIENT PORTAL LINK FT
You can access the FollowMyHealth Patient Portal offered by [facility] by registering at the following website: [email or web address]. By joining [employer]’s FollowMyHealth portal, you will also be able to view your health information using other applications (apps) compatible with our system.

## 2022-04-19 NOTE — ED PROVIDER NOTE - NSFOLLOWUPINSTRUCTIONS_ED_ALL_ED_FT
STOP TAKING YOUR PLAVIX and START TAKING ELIQUIS (tomorrow) as prescribed    Make sure to call to arrange follow up with your cardiologist, electrophysiologist and neurologist for close follow up and to inform them of the medication change as discussed with Dr. Gurrola      Atrial Fibrillation       Atrial fibrillation is a type of irregular or rapid heartbeat (arrhythmia). In atrial fibrillation, the top part of the heart (atria) beats in an irregular pattern. This makes the heart unable to pump blood normally and effectively.    The goal of treatment is to prevent blood clots from forming, control your heart rate, or restore your heartbeat to a normal rhythm. If this condition is not treated, it can cause serious problems, such as a weakened heart muscle (cardiomyopathy) or a stroke.      What are the causes?    This condition is often caused by medical conditions that damage the heart's electrical system. These include:  •High blood pressure (hypertension). This is the most common cause.      •Certain heart problems or conditions, such as heart failure, coronary artery disease, heart valve problems, or heart surgery.      •Diabetes.      •Overactive thyroid (hyperthyroidism).      •Obesity.      •Chronic kidney disease.      In some cases, the cause of this condition is not known.      What increases the risk?    This condition is more likely to develop in:  •Older people.      •People who smoke.      •Athletes who do endurance exercise.      •People who have a family history of atrial fibrillation.      •Men.      •People who use drugs.      •People who drink a lot of alcohol.      •People who have lung conditions, such as emphysema, pneumonia, or COPD.      •People who have obstructive sleep apnea.        What are the signs or symptoms?    Symptoms of this condition include:  •A feeling that your heart is racing or beating irregularly.      •Discomfort or pain in your chest.      •Shortness of breath.      •Sudden light-headedness or weakness.      •Tiring easily during exercise or activity.      •Fatigue.      •Syncope (fainting).      •Sweating.      In some cases, there are no symptoms.      How is this diagnosed?    Your health care provider may detect atrial fibrillation when taking your pulse. If detected, this condition may be diagnosed with:  •An electrocardiogram (ECG) to check electrical signals of the heart.      •An ambulatory cardiac monitor to record your heart's activity for a few days.      •A transthoracic echocardiogram (TTE) to create pictures of your heart.      •A transesophageal echocardiogram (MICHAEL) to create even closer pictures of your heart.      •A stress test to check your blood supply while you exercise.      •Imaging tests, such as a CT scan or chest X-ray.      •Blood tests.        How is this treated?    Treatment depends on underlying conditions and how you feel when you experience atrial fibrillation. This condition may be treated with:  •Medicines to prevent blood clots or to treat heart rate or heart rhythm problems.      •Electrical cardioversion to reset the heart's rhythm.      •A pacemaker to correct abnormal heart rhythm.      •Ablation to remove the heart tissue that sends abnormal signals.      •Left atrial appendage closure to seal the area where blood clots can form.      In some cases, underlying conditions will be treated.      Follow these instructions at home:    Medicines     •Take over-the counter and prescription medicines only as told by your health care provider.      • Do not take any new medicines without talking to your health care provider.    •If you are taking blood thinners:   • Talk with your health care provider before you take any medicines that contain aspirin or NSAIDs, such as ibuprofen. These medicines increase your risk for dangerous bleeding.       •Take your medicine exactly as told, at the same time every day.       •Avoid activities that could cause injury or bruising, and follow instructions about how to prevent falls.       •Wear a medical alert bracelet or carry a card that lists what medicines you take.          Lifestyle                   • Do not use any products that contain nicotine or tobacco, such as cigarettes, e-cigarettes, and chewing tobacco. If you need help quitting, ask your health care provider.      •Eat heart-healthy foods. Talk with a dietitian to make an eating plan that is right for you.      •Exercise regularly as told by your health care provider.      • Do not drink alcohol.      •Lose weight if you are overweight.      • Do not use drugs, including cannabis.      General instructions     •If you have obstructive sleep apnea, manage your condition as told by your health care provider.      • Do not use diet pills unless your health care provider approves. Diet pills can make heart problems worse.      •Keep all follow-up visits as told by your health care provider. This is important.        Contact a health care provider if you:    •Notice a change in the rate, rhythm, or strength of your heartbeat.      •Are taking a blood thinner and you notice more bruising.      •Tire more easily when you exercise or do heavy work.      •Have a sudden change in weight.        Get help right away if you have:     •Chest pain, abdominal pain, sweating, or weakness.      •Trouble breathing.      •Side effects of blood thinners, such as blood in your vomit, stool, or urine, or bleeding that cannot stop.    •Any symptoms of a stroke. "BE FAST" is an easy way to remember the main warning signs of a stroke:  •B - Balance. Signs are dizziness, sudden trouble walking, or loss of balance.      •E - Eyes. Signs are trouble seeing or a sudden change in vision.      •F - Face. Signs are sudden weakness or numbness of the face, or the face or eyelid drooping on one side.      •A - Arms. Signs are weakness or numbness in an arm. This happens suddenly and usually on one side of the body.      •S - Speech. Signs are sudden trouble speaking, slurred speech, or trouble understanding what people say.      •T - Time. Time to call emergency services. Write down what time symptoms started.      •Other signs of a stroke, such as:  •A sudden, severe headache with no known cause.      •Nausea or vomiting.      •Seizure.        These symptoms may represent a serious problem that is an emergency. Do not wait to see if the symptoms will go away. Get medical help right away. Call your local emergency services (911 in the [place] Do not drive yourself to the hospital.       Summary    •Atrial fibrillation is a type of irregular or rapid heartbeat (arrhythmia).      •Symptoms include a feeling that your heart is beating fast or irregularly.      •You may be given medicines to prevent blood clots or to treat heart rate or heart rhythm problems.      •Get help right away if you have signs or symptoms of a stroke.      •Get help right away if you cannot catch your breath or have chest pain or pressure.      This information is not intended to replace advice given to you by your health care provider. Make sure you discuss any questions you have with your health care provider.

## 2022-06-29 ENCOUNTER — APPOINTMENT (OUTPATIENT)
Dept: ORTHOPEDIC SURGERY | Facility: CLINIC | Age: 82
End: 2022-06-29

## 2022-06-29 VITALS
HEIGHT: 69 IN | SYSTOLIC BLOOD PRESSURE: 96 MMHG | HEART RATE: 78 BPM | OXYGEN SATURATION: 97 % | BODY MASS INDEX: 26.66 KG/M2 | DIASTOLIC BLOOD PRESSURE: 56 MMHG | TEMPERATURE: 98.6 F | WEIGHT: 180 LBS

## 2022-06-29 PROCEDURE — 72083 X-RAY EXAM ENTIRE SPI 4/5 VW: CPT

## 2022-06-29 PROCEDURE — 99204 OFFICE O/P NEW MOD 45 MIN: CPT

## 2022-07-12 ENCOUNTER — EMERGENCY (EMERGENCY)
Facility: HOSPITAL | Age: 82
LOS: 1 days | Discharge: HOME | End: 2022-07-12
Attending: EMERGENCY MEDICINE | Admitting: EMERGENCY MEDICINE
Payer: MEDICARE

## 2022-07-12 VITALS
TEMPERATURE: 98 F | RESPIRATION RATE: 18 BRPM | DIASTOLIC BLOOD PRESSURE: 66 MMHG | OXYGEN SATURATION: 98 % | HEIGHT: 68 IN | SYSTOLIC BLOOD PRESSURE: 106 MMHG | HEART RATE: 75 BPM

## 2022-07-12 DIAGNOSIS — Z98.89 OTHER SPECIFIED POSTPROCEDURAL STATES: Chronic | ICD-10-CM

## 2022-07-12 DIAGNOSIS — Z95.5 PRESENCE OF CORONARY ANGIOPLASTY IMPLANT AND GRAFT: Chronic | ICD-10-CM

## 2022-07-12 PROCEDURE — 99212 OFFICE O/P EST SF 10 MIN: CPT

## 2022-07-12 PROCEDURE — L9995: CPT

## 2022-07-12 NOTE — ED PROVIDER NOTE - CLINICAL SUMMARY MEDICAL DECISION MAKING FREE TEXT BOX
81 y/o M presents to ED for suture removal. 10 sutures removed without incident. No bleeding or signs of infection. Pt stable for discharge. 81 y/o M presents to ED for suture removal. 10 sutures removed without incident. No bleeding or signs of infection. Pt stable for discharge. Return precautions discussed.
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
## 2022-07-12 NOTE — ED ADULT TRIAGE NOTE - NS ED TRIAGE AVPU SCALE
Alert-The patient is alert, awake and responds to voice. The patient is oriented to time, place, and person. The triage nurse is able to obtain subjective information.
73

## 2022-07-12 NOTE — ED PROVIDER NOTE - PATIENT PORTAL LINK FT
You can access the FollowMyHealth Patient Portal offered by [facility] by registering at the following website: [email or web address]. By joining [employer]’s FollowMyHealth portal, you will also be able to view your health information using other applications (apps) compatible with our system.

## 2022-07-12 NOTE — ED PROVIDER NOTE - PHYSICAL EXAMINATION
GEN: Well appearing, well developed, awake, alert, oriented to person, place, time/situation and in no apparent distress. NTAF  ENT: Airway patent, Nasal mucosa clear. Mouth with normal mucosa.  EYES: Clear bilaterally. PERRL, EOMI  RESPIRATORY: Breathing comfortably with normal RR. No W/C/R, no hypoxia or resp distress.  CARDIAC: Regular rate and rhythm, no M/R/G  MSK: Range of motion is not limited, no deformities noted.  NEURO: Alert and oriented, no focal deficits.  SKIN: Skin normal color for race, warm, dry and intact. No evidence of rash. Scabbed over wound to top of head. No signs of infection.   PSYCH: Alert and oriented to person, place, time/situation. normal mood and affect. no apparent risk to self or others.

## 2022-07-12 NOTE — ED PROVIDER NOTE - NS ED ROS FT
No f/c, no cp/sob, no n/v, no abd pain, no ha or neck pain, no dizziness or syncope, no n/t/w in ext. No other complaints.

## 2022-07-12 NOTE — ED PROVIDER NOTE - NSFOLLOWUPINSTRUCTIONS_ED_ALL_ED_FT
Please follow up with your primary care physician.    Return to the Emergency Department if you have any new or worsening symptoms, or for any other concerns. Please read below for further information.    Suture/Staple Removal    After having your stitches or staples removed it is typical to have minor discomfort, swelling, or redness in the area. The wound is still healing so continue to protect it from injury. Keep the wound dry and if given creams, ointments, or medication, take as instructed to by your health care professional.    SEEK IMMEDIATE MEDICAL CARE IF YOU HAVE ANY OF THE FOLLOWING SYMPTOMS: increasing redness/swelling/pain in the wound, pus coming from the wound, bad smell coming from the wound, or fever.

## 2022-07-15 DIAGNOSIS — E11.9 TYPE 2 DIABETES MELLITUS WITHOUT COMPLICATIONS: ICD-10-CM

## 2022-07-15 DIAGNOSIS — Z79.82 LONG TERM (CURRENT) USE OF ASPIRIN: ICD-10-CM

## 2022-07-15 DIAGNOSIS — Z79.01 LONG TERM (CURRENT) USE OF ANTICOAGULANTS: ICD-10-CM

## 2022-07-15 DIAGNOSIS — Z79.84 LONG TERM (CURRENT) USE OF ORAL HYPOGLYCEMIC DRUGS: ICD-10-CM

## 2022-07-15 DIAGNOSIS — E78.5 HYPERLIPIDEMIA, UNSPECIFIED: ICD-10-CM

## 2022-07-15 DIAGNOSIS — S01.01XD LACERATION WITHOUT FOREIGN BODY OF SCALP, SUBSEQUENT ENCOUNTER: ICD-10-CM

## 2022-07-15 DIAGNOSIS — M10.9 GOUT, UNSPECIFIED: ICD-10-CM

## 2022-07-15 DIAGNOSIS — I25.10 ATHEROSCLEROTIC HEART DISEASE OF NATIVE CORONARY ARTERY WITHOUT ANGINA PECTORIS: ICD-10-CM

## 2022-07-15 DIAGNOSIS — Z91.040 LATEX ALLERGY STATUS: ICD-10-CM

## 2022-07-15 DIAGNOSIS — Z95.5 PRESENCE OF CORONARY ANGIOPLASTY IMPLANT AND GRAFT: ICD-10-CM

## 2022-07-15 DIAGNOSIS — I10 ESSENTIAL (PRIMARY) HYPERTENSION: ICD-10-CM
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
## 2022-07-15 DIAGNOSIS — N40.0 BENIGN PROSTATIC HYPERPLASIA WITHOUT LOWER URINARY TRACT SYMPTOMS: ICD-10-CM

## 2022-07-18 ENCOUNTER — EMERGENCY (EMERGENCY)
Facility: HOSPITAL | Age: 82
LOS: 1 days | Discharge: HOME | End: 2022-07-18
Admitting: STUDENT IN AN ORGANIZED HEALTH CARE EDUCATION/TRAINING PROGRAM
Payer: MEDICARE

## 2022-07-18 VITALS
WEIGHT: 177.91 LBS | RESPIRATION RATE: 18 BRPM | OXYGEN SATURATION: 98 % | TEMPERATURE: 98 F | DIASTOLIC BLOOD PRESSURE: 68 MMHG | HEIGHT: 68 IN | HEART RATE: 57 BPM | SYSTOLIC BLOOD PRESSURE: 118 MMHG

## 2022-07-18 DIAGNOSIS — Z95.5 PRESENCE OF CORONARY ANGIOPLASTY IMPLANT AND GRAFT: Chronic | ICD-10-CM

## 2022-07-18 DIAGNOSIS — Z98.89 OTHER SPECIFIED POSTPROCEDURAL STATES: Chronic | ICD-10-CM

## 2022-07-18 PROCEDURE — L9995: CPT

## 2022-07-18 PROCEDURE — 99212 OFFICE O/P EST SF 10 MIN: CPT

## 2022-07-18 NOTE — ED PROVIDER NOTE - PHYSICAL EXAMINATION
CONSTITUTIONAL: Well-appearing;  in no apparent distress.   HEAD: Normocephalic; large scab on top of head, 1 suture and 1 staple in place  EYES: PERRL; EOM intact; conjunctiva and sclera clear  ENT: normal nose; no rhinorrhea; normal pharynx with no erythema or lesions.   NECK: Supple; non-tender;   CARDIOVASCULAR: rrr, no audible murmurs   RESPIRATORY: Breathing easily;

## 2022-07-18 NOTE — ED PROVIDER NOTE - CLINICAL SUMMARY MEDICAL DECISION MAKING FREE TEXT BOX
83 yo m here for suture/staple removal from scalp. pt came on 7/12 but they were unable to remove them all. Denies pain, ha, dizziness, fever, chills. large scab on top of head, 1 suture and 1 staple in place. Staple and suture removed.
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
## 2022-07-18 NOTE — ED ADULT NURSE NOTE - NS_NURSE_DISC_TEACHING_YN_ED_ALL_ED
Rolly with [employer] called and would like a call back concerning the clarification on form you sent. Fax number is [phone]. You can reference the  Case number [id number]. Yes

## 2022-07-18 NOTE — ED PROVIDER NOTE - PATIENT PORTAL LINK FT
You can access the FollowMyHealth Patient Portal offered by [facility] by registering at the following website: [email or web address]. By joining [employer]’s FollowMyHealth portal, you will also be able to view your health information using other applications (apps) compatible with our system.

## 2022-07-18 NOTE — ED PROVIDER NOTE - OBJECTIVE STATEMENT
Pt stable throughout shift with no signs of distress. Rounded on hourly and PRN. Turns self with assistance. 4L O2. Suctions self with younker. PRN tylenol given 1x this shift for mild arm pain/stiffness.  updated x2 via phone. Valderrama draining adequately, free of kinks. No BM this shift, bowel sounds active all four quadrants. Prepared to give detailed report to day shift RN. 81 yo m here for suture/staple removal from scalp. pt came on 7/12 but they were unable to remove them all. Denies pain, ha, dizziness, fever, chills.

## 2022-07-20 ENCOUNTER — APPOINTMENT (OUTPATIENT)
Dept: ORTHOPEDIC SURGERY | Facility: CLINIC | Age: 82
End: 2022-07-20

## 2022-07-20 DIAGNOSIS — S01.01XD LACERATION WITHOUT FOREIGN BODY OF SCALP, SUBSEQUENT ENCOUNTER: ICD-10-CM
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
## 2022-07-20 DIAGNOSIS — Z48.02 ENCOUNTER FOR REMOVAL OF SUTURES: ICD-10-CM

## 2022-07-20 DIAGNOSIS — M48.02 SPINAL STENOSIS, CERVICAL REGION: ICD-10-CM

## 2022-07-20 PROCEDURE — 99214 OFFICE O/P EST MOD 30 MIN: CPT
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
## 2022-07-27 NOTE — DISCUSSION/SUMMARY
[de-identified] : Mr. schmid is suffering from severe back pain, worsened over the past few months significantly.  in 2019 had L1-pelvis PSF and instrumentation and now with T12-L1 instability.  MRI shows some adjacent segment disease and stenosis at T12/L1 though incompeltely imaged.  Requires L spine CT non contrast to assess for fusion, adjacent segments of TL junction, and bony anatomy of TL junction.  CT should be T9-Pelvis.  THis is required for operative planning.  Also dedicated MRI necessary of cervical spine to assess for stenosis given findings seen on thoracic MRI.  ALl questions answered.

## 2022-07-27 NOTE — PHYSICAL EXAM
[de-identified] : General: patient is well developed, well nourished, in no acute \par distress, alert and oriented x 3. \par \par Mood and affect: normal\par \par Respiratory: no respiratory distress noted\par \par Skin: well healed incision, skin intact, no erythema, increased warmth\par \par Alignment:The spine is well compensated in the coronal and sagittal plane. \par \par Gait: The patient is able to toe walk and heel walk without difficulty. \par \par Palpation: no tenderness to palpation spine or paraspinal region\par \par Range of motion: Lumbar spine ROM is restricted\par \par Neurologic Exam:\par Motor: Manual Muscle testing in the lower extremities is 5 out of 5 in all muscle groups. There is no evidence of muscular atrophy in the lower extremities. Sensory: Sensation to light touch is grossly intact in the lower extremities\par \par Reflexes: DTR are present and symmetric throughout, negative mayberry bilaterally, negative inverted radial reflex bilaterally, no clonus, plantar responses are flexor\par \par Hip Exam: No pain with internal or external rotation of hips bilaterally\par \par Special tests: Straight leg raise test negative. Cross straight leg test negative. ENRIQUE test negative\par \par Vascular: Examination of the peripheral vascular system demonstrates no evidence of congestion or edema. 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 \par \par MRI Thoracic 7/12/22: incomplete images though severe stenosis seen C4-6.\par \par MRI lumbar: 7/12/22: (my read): L1-Pelvis PSF.  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

## 2022-08-15 NOTE — PHYSICAL EXAM
[de-identified] : General: patient is well developed, well nourished, in no acute \par distress, alert and oriented x 3. \par \par Mood and affect: normal\par \par Respiratory: no respiratory distress noted\par \par Skin: well healed incision, skin intact, no erythema, increased warmth\par \par Alignment:The spine is well compensated in the coronal and sagittal plane. \par \par Gait: The patient is able to toe walk and heel walk without difficulty. \par \par Palpation: no tenderness to palpation spine or paraspinal region\par \par Range of motion: Lumbar spine ROM is restricted\par \par Neurologic Exam:\par Motor: Manual Muscle testing in the lower extremities is 5 out of 5 in all muscle groups. There is no evidence of muscular atrophy in the lower extremities. Sensory: Sensation to light touch is grossly intact in the lower extremities\par \par Reflexes: DTR are present and symmetric throughout, negative mayberry bilaterally, negative inverted radial reflex bilaterally, no clonus, plantar responses are flexor\par \par Hip Exam: No pain with internal or external rotation of hips bilaterally\par \par Special tests: Straight leg raise test negative.  Cross straight leg test negative.  ENRIQUE test negative\par \par Vascular: Examination of the peripheral vascular system demonstrates no evidence of congestion or edema. 

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
## 2022-08-15 NOTE — DISCUSSION/SUMMARY
[de-identified] : Discussed my findings with the patient. Mr. Aguilar has been suffering from severe chronic mid and low back pain, and is now requiring the use of opioid medication. Discussed with patient that XR imaging shows dynamic instability at T12/L1, the level above his construct, which may require surgical intervention in the future. I am recommending an updated MRI thoracic and lumbar spine (metal artifact reduction protocol) for further evaluation, orders given. Follow up with me after imaging is completed, sooner if there is an issue. All questions answered.

## 2022-10-31 ENCOUNTER — RESULT REVIEW (OUTPATIENT)
Age: 82
End: 2022-10-31

## 2022-12-12 ENCOUNTER — APPOINTMENT (OUTPATIENT)
Dept: NEPHROLOGY | Facility: CLINIC | Age: 82
End: 2022-12-12

## 2023-01-09 PROBLEM — N43.3 BILATERAL HYDROCELE: Status: ACTIVE | Noted: 2023-01-09

## 2023-01-10 ENCOUNTER — APPOINTMENT (OUTPATIENT)
Dept: UROLOGY | Facility: CLINIC | Age: 83
End: 2023-01-10
Payer: MEDICARE

## 2023-01-10 VITALS
TEMPERATURE: 98 F | DIASTOLIC BLOOD PRESSURE: 64 MMHG | OXYGEN SATURATION: 95 % | HEART RATE: 73 BPM | SYSTOLIC BLOOD PRESSURE: 97 MMHG

## 2023-01-10 DIAGNOSIS — G89.29 LOW BACK PAIN, UNSPECIFIED: ICD-10-CM

## 2023-01-10 DIAGNOSIS — K42.9 UMBILICAL HERNIA W/OUT OBSTRUCTION OR GANGRENE: ICD-10-CM

## 2023-01-10 DIAGNOSIS — E78.5 HYPERLIPIDEMIA, UNSPECIFIED: ICD-10-CM

## 2023-01-10 DIAGNOSIS — Z78.9 OTHER SPECIFIED HEALTH STATUS: ICD-10-CM

## 2023-01-10 DIAGNOSIS — N43.3 HYDROCELE, UNSPECIFIED: ICD-10-CM

## 2023-01-10 DIAGNOSIS — R68.89 OTHER GENERAL SYMPTOMS AND SIGNS: ICD-10-CM

## 2023-01-10 DIAGNOSIS — Z80.1 FAMILY HISTORY OF MALIGNANT NEOPLASM OF TRACHEA, BRONCHUS AND LUNG: ICD-10-CM

## 2023-01-10 DIAGNOSIS — M54.50 LOW BACK PAIN, UNSPECIFIED: ICD-10-CM

## 2023-01-10 DIAGNOSIS — R33.9 RETENTION OF URINE, UNSPECIFIED: ICD-10-CM

## 2023-01-10 PROCEDURE — 51798 US URINE CAPACITY MEASURE: CPT

## 2023-01-10 PROCEDURE — 99205 OFFICE O/P NEW HI 60 MIN: CPT

## 2023-01-10 RX ORDER — CEPHALEXIN 500 MG/1
500 CAPSULE ORAL
Qty: 32 | Refills: 0 | Status: DISCONTINUED | COMMUNITY
Start: 2018-04-25 | End: 2023-01-10

## 2023-01-10 RX ORDER — SUCRALFATE 1 G/1
1 TABLET ORAL
Qty: 120 | Refills: 0 | Status: DISCONTINUED | COMMUNITY
Start: 2018-03-18 | End: 2023-01-10

## 2023-01-10 RX ORDER — TRIAMCINOLONE ACETONIDE 5 MG/G
0.5 OINTMENT TOPICAL
Qty: 60 | Refills: 0 | Status: DISCONTINUED | COMMUNITY
Start: 2017-11-14 | End: 2023-01-10

## 2023-01-10 RX ORDER — ENALAPRIL MALEATE 20 MG/1
20 TABLET ORAL
Refills: 0 | Status: ACTIVE | COMMUNITY

## 2023-01-10 RX ORDER — CILOSTAZOL 100 MG/1
100 TABLET ORAL
Qty: 180 | Refills: 0 | Status: DISCONTINUED | COMMUNITY
Start: 2018-04-12 | End: 2023-01-10

## 2023-01-10 RX ORDER — DIAZEPAM 5 MG/1
5 TABLET ORAL
Qty: 1 | Refills: 0 | Status: DISCONTINUED | COMMUNITY
Start: 2022-07-27 | End: 2023-01-10

## 2023-01-10 RX ORDER — METHOCARBAMOL 500 MG/1
500 TABLET, FILM COATED ORAL EVERY 8 HOURS
Qty: 30 | Refills: 1 | Status: DISCONTINUED | COMMUNITY
Start: 2022-06-29 | End: 2023-01-10

## 2023-01-10 RX ORDER — PREDNISONE 10 MG/1
10 TABLET ORAL
Qty: 69 | Refills: 0 | Status: DISCONTINUED | COMMUNITY
Start: 2018-03-26 | End: 2023-01-10

## 2023-01-10 RX ORDER — AMLODIPINE BESYLATE 5 MG/1
TABLET ORAL
Refills: 0 | Status: ACTIVE | COMMUNITY

## 2023-01-10 RX ORDER — TAMSULOSIN HYDROCHLORIDE 0.4 MG/1
0.4 CAPSULE ORAL
Qty: 180 | Refills: 3 | Status: DISCONTINUED | COMMUNITY
Start: 2018-05-17 | End: 2023-01-10

## 2023-01-10 RX ORDER — METOPROLOL SUCCINATE 100 MG/1
100 TABLET, EXTENDED RELEASE ORAL
Qty: 90 | Refills: 0 | Status: DISCONTINUED | COMMUNITY
Start: 2018-04-16 | End: 2023-01-10

## 2023-01-10 RX ORDER — METHYLPREDNISOLONE 4 MG/1
4 TABLET ORAL
Qty: 21 | Refills: 0 | Status: DISCONTINUED | COMMUNITY
Start: 2018-03-01 | End: 2023-01-10

## 2023-01-10 RX ORDER — DIAZEPAM 2 MG/1
2 TABLET ORAL
Qty: 1 | Refills: 0 | Status: DISCONTINUED | COMMUNITY
Start: 2022-07-12 | End: 2023-01-10

## 2023-01-10 RX ORDER — OXYCODONE AND ACETAMINOPHEN 5; 325 MG/1; MG/1
5-325 TABLET ORAL
Qty: 30 | Refills: 0 | Status: DISCONTINUED | COMMUNITY
Start: 2018-03-15 | End: 2023-01-10

## 2023-01-10 NOTE — ASSESSMENT
[FreeTextEntry1] : I discussed the findings and options with . DONOVAN PELAEZ in detail. I am not sure to what extent the constellation of his symptoms are primarily urological; that is, the back issues may be contributing to the acute worsening of his voiding pattern.  I advised that he increase the tamsulosin to twice daily while monitoring his blood pressure.  He should also maintain the finasteride.  An additional pharmacologic intervention may be to add an anticholinergic/beta 3 agonist, but I do not think this will be beneficial and the side effects may outweigh any advantages.  \par \par I discussed the various prostatic reductive surgical procedures with Mr. Pelaez  (including the gold standard TURP, laser prostatectomy [HoLEP, Greenlight], urethral lift [Uro-Lift], water vapor thermal therapy [Rezum], aquablation [AquaBeam Robotic System], prostatic artery embolization), as well as the risks (including, leading, infection, persistent/recurrent retention, incontinence, erectile dysfunction, urethral stricture/bladder neck contracture, adjacent organ injury) and benefits of each.  Retrograde ejaculation is an expected outcome with some of these procedures.\par \par Regarding the abnormal SADIE, we will forego any further intervention for this because of his age and overall condition.  He understands that based on his age alone he has a greater than 50% chance of harboring a prostatic adenocarcinoma.\par \par \par \par \par

## 2023-01-10 NOTE — PHYSICAL EXAM
[General Appearance - Well Developed] : well developed [General Appearance - Well Nourished] : well nourished [Normal Appearance] : normal appearance [Well Groomed] : well groomed [General Appearance - In No Acute Distress] : no acute distress [Edema] : no peripheral edema [Respiration, Rhythm And Depth] : normal respiratory rhythm and effort [Exaggerated Use Of Accessory Muscles For Inspiration] : no accessory muscle use [Abdomen Soft] : soft [Abdomen Tenderness] : non-tender [Costovertebral Angle Tenderness] : no ~M costovertebral angle tenderness [Urethral Meatus] : meatus normal [Urinary Bladder Findings] : the bladder was normal on palpation [Scrotum] : the scrotum was normal [Testes Mass (___cm)] : there were no testicular masses [Normal Station and Gait] : the gait and station were normal for the patient's age [] : no rash [No Focal Deficits] : no focal deficits [Oriented To Time, Place, And Person] : oriented to person, place, and time [Affect] : the affect was normal [Mood] : the mood was normal [Not Anxious] : not anxious [No Palpable Adenopathy] : no palpable adenopathy [Abdomen Mass (___ Cm)] : no abdominal mass palpated [Penis Abnormality] : normal circumcised penis [Epididymis] : the epididymides were normal [Testes Tenderness] : no tenderness of the testes [Prostate Tenderness] : the prostate was not tender [FreeTextEntry1] : Small right apical prostate nodularity

## 2023-01-10 NOTE — LETTER BODY
[Dear  ___] : Dear  [unfilled], [Consult Letter:] : I had the pleasure of evaluating your patient, [unfilled]. [Please see my note below.] : Please see my note below. [Consult Closing:] : Thank you very much for allowing me to participate in the care of this patient.  If you have any questions, please do not hesitate to contact me. [Sincerely,] : Sincerely, [FreeTextEntry3] : Bang Spence MD, FACS
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
## 2023-01-10 NOTE — ADDENDUM
[FreeTextEntry1] : A portion of this note was written by [Burton Verdugo] on 01/09/2023 acting as a scribe for Dr. Spence. \par \par I have personally reviewed the chart and agree that the record accurately reflects my personal performance of the history, physical exam, assessment, and plan.

## 2023-01-13 ENCOUNTER — APPOINTMENT (OUTPATIENT)
Dept: UROLOGY | Facility: CLINIC | Age: 83
End: 2023-01-13

## 2023-03-22 ENCOUNTER — APPOINTMENT (OUTPATIENT)
Dept: VASCULAR SURGERY | Facility: CLINIC | Age: 83
End: 2023-03-22
Payer: MEDICARE

## 2023-03-22 VITALS
SYSTOLIC BLOOD PRESSURE: 101 MMHG | DIASTOLIC BLOOD PRESSURE: 68 MMHG | HEIGHT: 68 IN | HEART RATE: 94 BPM | WEIGHT: 170 LBS | BODY MASS INDEX: 25.76 KG/M2

## 2023-03-22 PROCEDURE — 99203 OFFICE O/P NEW LOW 30 MIN: CPT

## 2023-03-22 PROCEDURE — 93970 EXTREMITY STUDY: CPT

## 2023-03-22 RX ORDER — TRAMADOL HYDROCHLORIDE 50 MG/1
50 TABLET, COATED ORAL
Qty: 18 | Refills: 0 | Status: DISCONTINUED | COMMUNITY
Start: 2018-04-01 | End: 2023-03-22

## 2023-03-22 RX ORDER — GLIMEPIRIDE 1 MG/1
1 TABLET ORAL
Qty: 90 | Refills: 0 | Status: DISCONTINUED | COMMUNITY
Start: 2018-04-17 | End: 2023-03-22

## 2023-03-22 RX ORDER — APIXABAN 5 MG/1
5 TABLET, FILM COATED ORAL
Qty: 60 | Refills: 3 | Status: ACTIVE | COMMUNITY
Start: 2023-03-22 | End: 1900-01-01

## 2023-03-22 RX ORDER — TAMSULOSIN HYDROCHLORIDE 0.4 MG/1
0.4 CAPSULE ORAL
Qty: 30 | Refills: 0 | Status: DISCONTINUED | COMMUNITY
Start: 2018-03-27 | End: 2023-03-22

## 2023-03-22 RX ORDER — ATORVASTATIN CALCIUM 80 MG/1
80 TABLET, FILM COATED ORAL
Qty: 90 | Refills: 0 | Status: DISCONTINUED | COMMUNITY
Start: 2018-04-20 | End: 2023-03-22

## 2023-03-22 RX ORDER — CLOPIDOGREL 75 MG/1
TABLET, FILM COATED ORAL
Refills: 0 | Status: ACTIVE | COMMUNITY

## 2023-03-22 RX ORDER — METOPROLOL TARTRATE 75 MG/1
TABLET, FILM COATED ORAL
Refills: 0 | Status: ACTIVE | COMMUNITY

## 2023-03-23 ENCOUNTER — APPOINTMENT (OUTPATIENT)
Dept: PAIN MANAGEMENT | Facility: CLINIC | Age: 83
End: 2023-03-23
Payer: MEDICARE

## 2023-03-23 VITALS — HEIGHT: 68 IN | WEIGHT: 170 LBS | BODY MASS INDEX: 25.76 KG/M2

## 2023-03-23 DIAGNOSIS — M96.1 POSTLAMINECTOMY SYNDROME, NOT ELSEWHERE CLASSIFIED: ICD-10-CM

## 2023-03-23 DIAGNOSIS — R07.81 PLEURODYNIA: ICD-10-CM

## 2023-03-23 DIAGNOSIS — M54.50 LOW BACK PAIN, UNSPECIFIED: ICD-10-CM

## 2023-03-23 PROCEDURE — 99204 OFFICE O/P NEW MOD 45 MIN: CPT

## 2023-03-23 RX ORDER — PREGABALIN 50 MG/1
50 CAPSULE ORAL 3 TIMES DAILY
Qty: 90 | Refills: 0 | Status: ACTIVE | COMMUNITY
Start: 2023-03-23 | End: 1900-01-01
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
## 2023-03-23 NOTE — PHYSICAL EXAM
[UE/LE] : Sensory: Intact in bilateral upper & lower extremities [ALL] : dorsalis pedis, posterior tibial, femoral, popliteal, and radial 2+ and symmetric bilaterally [Cranial Nerves Optic (II)] : visual acuity intact bilaterally,  visual fields full to confrontation, pupils equal round and reactive to light [Cranial Nerves Oculomotor (III)] : extraocular motion intact [Cranial Nerves Trigeminal (V)] : facial sensation intact symmetrically [Cranial Nerves Facial (VII)] : face symmetrical [Cranial Nerves Vestibulocochlear (VIII)] : hearing was intact bilaterally [Cranial Nerves Glossopharyngeal (IX)] : tongue and palate midline [Cranial Nerves Accessory (XI - Cranial And Spinal)] : head turning and shoulder shrug symmetric [Cranial Nerves Hypoglossal (XII)] : there was no tongue deviation with protrusion [Normal] : Normal affect [de-identified] : muscle tenderness paraspinal muscles lumbar bilaterally, point tenderness bilateral T12 ribs, mild facet loading test bilaterally
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
## 2023-03-24 NOTE — REVIEW OF SYSTEMS
[Negative] : Heme/Lymph [As noted in HPI] : as noted in HPI [Lower Ext Edema] : lower extremity edema [Arthralgias] : arthralgias [Joint Stiffness] : joint stiffness [Limb Pain] : limb pain [Limb Swelling] : limb swelling [As Noted in HPI] : as noted in HPI [Difficulty Walking] : difficulty walking

## 2023-03-28 ENCOUNTER — OUTPATIENT (OUTPATIENT)
Dept: OUTPATIENT SERVICES | Facility: HOSPITAL | Age: 83
LOS: 1 days | End: 2023-03-28
Payer: MEDICARE

## 2023-03-28 ENCOUNTER — APPOINTMENT (OUTPATIENT)
Dept: CT IMAGING | Facility: HOSPITAL | Age: 83
End: 2023-03-28

## 2023-03-28 DIAGNOSIS — Z95.5 PRESENCE OF CORONARY ANGIOPLASTY IMPLANT AND GRAFT: Chronic | ICD-10-CM

## 2023-03-28 DIAGNOSIS — Z98.89 OTHER SPECIFIED POSTPROCEDURAL STATES: Chronic | ICD-10-CM

## 2023-03-28 LAB — POCT ISTAT CREATININE: 1.9 MG/DL — HIGH (ref 0.5–1.3)

## 2023-03-28 PROCEDURE — 74174 CTA ABD&PLVS W/CONTRAST: CPT

## 2023-03-28 PROCEDURE — 82565 ASSAY OF CREATININE: CPT

## 2023-03-28 PROCEDURE — 74174 CTA ABD&PLVS W/CONTRAST: CPT | Mod: 26

## 2023-03-29 NOTE — PROCEDURE
[FreeTextEntry1] : Bilt LE Venous US ordered today to r/o DVT, reveals: DVT in bilateral LEs. RLE subacute DVT at the popliteal vein. LLE DVT appears to be more chronic with reconstitution from Femoral vein to popliteal vein. B/l severe calf edema.
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
## 2023-03-29 NOTE — ADDENDUM
[FreeTextEntry1] : I, Dr. Kolton Arana, personally performed the evaluation and management (E/M) services for this new patient.  That E/M includes conducting the initial examination, assessing all conditions, and establishing the plan of care.  Today, my ACP, Denice Banuelos NP, was here to observe my evaluation and management services for this patient to be followed going forward. \par \par \par The documentation for this encounter was entered by Elan Felipe acting as a scribe for Dr.Gary Arana.\par
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
## 2023-03-29 NOTE — ASSESSMENT
[Arterial/Venous Disease] : arterial/venous disease [Medication Management] : medication management [FreeTextEntry1] : 82 y/o M w/ with incidental findings of BLEs DVT (subacute, L chronic) and severe pitting edema of both LEs. \par On exam, good capillary refill. Palpable popliteal, DP and PT pulses b/l (confirmed with hand held doppler given degree of edema). No palpable cords. Feet warm to touch. Skin intact. +3-4 pitting edema on both calves, ankles and feet. \par LE venous duplex revealed DVT in bilateral LEs. RLE subacute DVT at the popliteal vein. LLE DVT appears to be more chronic with reconstitution from Femoral vein to popliteal vein. B/l severe calf edema. \par \par Plan: \par 1. I reviewed and discussed findings. Patient explained to start AC therapy at this time, Rx for Eliquis provided. \par 2. CT venogram of the abdomen and pelvis ordered for further evaluation to r/o any abdominopelvic vein compression and/or masses\par 3..Recommend compression stockings to control edema in the meantime. \par 4. Keep skin moisturized\par 5. Walk as much as tolerated, and calf pump exercises.\par \par Will call pt with result sof CT venogram once completed with next steps to follow.\par

## 2023-03-29 NOTE — PHYSICAL EXAM
[Alert] : alert [Oriented to Person] : oriented to person [Oriented to Place] : oriented to place [Oriented to Time] : oriented to time [Calm] : calm [Ankle Swelling (On Exam)] : present [Ankle Swelling Bilaterally] : bilaterally  [Ankle Swelling On The Left] : moderate [Respiratory Effort] : normal respiratory effort [2+] : left 2+ [No Rash or Lesion] : No rash or lesion [Varicose Veins Of Lower Extremities] : not present [] : not present [de-identified] : WNWD [FreeTextEntry1] : Good capillary refill. Palpable popliteal, DP and PT pulses b/l (confirmed with hand held doppler given degree of edema). No palpable cords. Feet warm to touch. Skin intact. +3-4 pitting edema on both calves, ankles and feet.  [de-identified] : FROM

## 2023-04-06 ENCOUNTER — APPOINTMENT (OUTPATIENT)
Dept: PAIN MANAGEMENT | Facility: CLINIC | Age: 83
End: 2023-04-06

## 2023-04-06 ENCOUNTER — TRANSCRIPTION ENCOUNTER (OUTPATIENT)
Age: 83
End: 2023-04-06

## 2023-09-01 ENCOUNTER — NON-APPOINTMENT (OUTPATIENT)
Age: 83
End: 2023-09-01

## 2023-09-01 ENCOUNTER — APPOINTMENT (OUTPATIENT)
Dept: UROLOGY | Facility: CLINIC | Age: 83
End: 2023-09-01
Payer: MEDICARE

## 2023-09-01 VITALS
SYSTOLIC BLOOD PRESSURE: 99 MMHG | HEART RATE: 95 BPM | DIASTOLIC BLOOD PRESSURE: 64 MMHG | TEMPERATURE: 98.1 F | OXYGEN SATURATION: 96 %

## 2023-09-01 DIAGNOSIS — K59.00 CONSTIPATION, UNSPECIFIED: ICD-10-CM

## 2023-09-01 PROCEDURE — 99215 OFFICE O/P EST HI 40 MIN: CPT

## 2023-09-01 RX ORDER — RIVAROXABAN 20 MG/1
20 TABLET, FILM COATED ORAL
Qty: 30 | Refills: 5 | Status: DISCONTINUED | COMMUNITY
Start: 2023-03-23 | End: 2023-09-01

## 2023-09-01 NOTE — ASSESSMENT
[FreeTextEntry1] : 83 year old man with history of HTN, CAD s/p stent on plavix, DVT on eliquis presents with gross hematuria after recent spinal surgery. Currently with merlot colored urine without clots. No difficulty urinating.   We discussed the typical causes and implication for hematuria. I stated that blood in the urine may be due to a multitude of different reasons including urinary stone disease, infection, and trauma. The most concerning cause is an occult malignancy. Given that hematuria began after recent urethral catheter placement, most likely due to trauma in setting of eliquis and plavix.   - UA, UCx  - Will reach out to Dr. Arana to see if eliquis can be held in setting of hematuria  - Patient will follow up next Wednesday for cystoscopy. He had CT A/P with contrast in March that did not show any urologic pathology

## 2023-09-01 NOTE — HISTORY OF PRESENT ILLNESS
[FreeTextEntry1] : 83 year old man with history of HTN, CAD s/p stent on plavix, DVT on eliquis presents with gross hematuria after recent spinal surgery.  Patient reports having a catheter placed during spinal surgery 3 weeks ago. He noted hematuria has been present since the catheter was placed. He was discharged with rothman catheter. Had catheter removed at [facility] and has been urinating without catheter since then. The urine color has been dark maroon colored. No clots. No difficulty passing the urine. No straining. No fevers, chills, dysuria. He had multiple urine cultures performed, but unsure of the results. Not currently on antibiotics.   He denies bothersome LUTS at baseline. He has been constipated since surgery. Taking pain medications for ongoing pain.   CT A/P with IV contrast 3/2023: bilateral renal cysts, non-obstructing renal stones up to 4 mm, 56 cc prostate with intravesical median lobe

## 2023-09-01 NOTE — LETTER BODY
[Dear  ___] : Dear  [unfilled], [Courtesy Letter:] : I had the pleasure of seeing your patient, [unfilled], in my office today. [Please see my note below.] : Please see my note below. [Consult Closing:] : Thank you very much for allowing me to participate in the care of this patient.  If you have any questions, please do not hesitate to contact me. [Sincerely,] : Sincerely, [FreeTextEntry3] : Parveen Moran MD

## 2023-09-05 LAB
APPEARANCE: ABNORMAL
BACTERIA UR CULT: ABNORMAL
BACTERIA: ABNORMAL /HPF
BILIRUBIN URINE: NEGATIVE
BLOOD URINE: ABNORMAL
COLOR: ABNORMAL
GLUCOSE QUALITATIVE U: NEGATIVE MG/DL
GRANULAR CASTS: NORMAL
HYALINE CASTS: NORMAL
KETONES URINE: NEGATIVE MG/DL
LEUKOCYTE ESTERASE URINE: ABNORMAL
MICROSCOPIC-UA: NORMAL
NITRITE URINE: NEGATIVE
PH URINE: 5.5
PROTEIN URINE: 300 MG/DL
RED BLOOD CELLS URINE: 760 /HPF
SPECIFIC GRAVITY URINE: 1.02
SQUAMOUS EPITHELIAL CELLS: PRESENT
UROBILINOGEN URINE: 1 MG/DL
WHITE BLOOD CELLS URINE: 366 /HPF

## 2023-09-05 RX ORDER — CIPROFLOXACIN HYDROCHLORIDE 500 MG/1
500 TABLET, FILM COATED ORAL
Qty: 14 | Refills: 0 | Status: ACTIVE | COMMUNITY
Start: 2023-09-05 | End: 1900-01-01

## 2023-09-06 ENCOUNTER — APPOINTMENT (OUTPATIENT)
Dept: UROLOGY | Facility: CLINIC | Age: 83
End: 2023-09-06

## 2023-09-06 LAB — URINE CYTOLOGY: NORMAL

## 2023-09-18 ENCOUNTER — TRANSCRIPTION ENCOUNTER (OUTPATIENT)
Age: 83
End: 2023-09-18

## 2023-09-19 ENCOUNTER — APPOINTMENT (OUTPATIENT)
Dept: UROLOGY | Facility: CLINIC | Age: 83
End: 2023-09-19
Payer: MEDICARE

## 2023-09-19 VITALS
TEMPERATURE: 98 F | HEART RATE: 114 BPM | DIASTOLIC BLOOD PRESSURE: 72 MMHG | HEIGHT: 68 IN | BODY MASS INDEX: 25.01 KG/M2 | WEIGHT: 165 LBS | SYSTOLIC BLOOD PRESSURE: 129 MMHG

## 2023-09-19 DIAGNOSIS — N39.0 URINARY TRACT INFECTION, SITE NOT SPECIFIED: ICD-10-CM

## 2023-09-19 DIAGNOSIS — R31.9 URINARY TRACT INFECTION, SITE NOT SPECIFIED: ICD-10-CM

## 2023-09-19 LAB
BILIRUB UR QL STRIP: NORMAL
CLARITY UR: CLEAR
COLLECTION METHOD: NORMAL
GLUCOSE UR-MCNC: NORMAL
HCG UR QL: 0.2 EU/DL
HGB UR QL STRIP.AUTO: ABNORMAL
KETONES UR-MCNC: ABNORMAL
LEUKOCYTE ESTERASE UR QL STRIP: ABNORMAL
NITRITE UR QL STRIP: NORMAL
PH UR STRIP: 5.5
PROT UR STRIP-MCNC: ABNORMAL
SP GR UR STRIP: 1.02

## 2023-09-19 PROCEDURE — 81003 URINALYSIS AUTO W/O SCOPE: CPT | Mod: QW

## 2023-09-19 PROCEDURE — 99214 OFFICE O/P EST MOD 30 MIN: CPT

## 2023-09-20 LAB
APPEARANCE: ABNORMAL
BACTERIA: NEGATIVE /HPF
BILIRUBIN URINE: NEGATIVE
BLOOD URINE: ABNORMAL
CAST: 8 /LPF
COLOR: YELLOW
EPITHELIAL CELLS: 0 /HPF
GLUCOSE QUALITATIVE U: NEGATIVE MG/DL
KETONES URINE: ABNORMAL MG/DL
LEUKOCYTE ESTERASE URINE: ABNORMAL
MICROSCOPIC-UA: NORMAL
NITRITE URINE: NEGATIVE
PH URINE: 5.5
PROTEIN URINE: 30 MG/DL
RED BLOOD CELLS URINE: 2 /HPF
REVIEW: NORMAL
SPECIFIC GRAVITY URINE: 1.01
UROBILINOGEN URINE: 0.2 MG/DL
WHITE BLOOD CELLS URINE: 406 /HPF

## 2023-09-21 LAB — BACTERIA UR CULT: NORMAL

## 2023-09-26 ENCOUNTER — APPOINTMENT (OUTPATIENT)
Dept: UROLOGY | Facility: CLINIC | Age: 83
End: 2023-09-26
Payer: MEDICARE

## 2023-09-26 LAB
BILIRUB UR QL STRIP: NORMAL
GLUCOSE UR-MCNC: NORMAL
HCG UR QL: 0.2 EU/DL
HGB UR QL STRIP.AUTO: NORMAL
KETONES UR-MCNC: NORMAL
LEUKOCYTE ESTERASE UR QL STRIP: NORMAL
NITRITE UR QL STRIP: NORMAL
PH UR STRIP: 5.5
PROT UR STRIP-MCNC: NORMAL
SP GR UR STRIP: 1.02

## 2023-09-26 PROCEDURE — 99214 OFFICE O/P EST MOD 30 MIN: CPT

## 2023-09-26 PROCEDURE — 81003 URINALYSIS AUTO W/O SCOPE: CPT | Mod: QW

## 2023-09-29 ENCOUNTER — APPOINTMENT (OUTPATIENT)
Dept: CT IMAGING | Facility: HOSPITAL | Age: 83
End: 2023-09-29

## 2023-10-06 ENCOUNTER — APPOINTMENT (OUTPATIENT)
Dept: UROLOGY | Facility: CLINIC | Age: 83
End: 2023-10-06
Payer: MEDICARE

## 2023-10-06 PROCEDURE — 99442: CPT

## 2023-11-15 ENCOUNTER — OUTPATIENT (OUTPATIENT)
Dept: OUTPATIENT SERVICES | Facility: HOSPITAL | Age: 83
LOS: 1 days | End: 2023-11-15
Payer: MEDICARE

## 2023-11-15 DIAGNOSIS — Z95.5 PRESENCE OF CORONARY ANGIOPLASTY IMPLANT AND GRAFT: Chronic | ICD-10-CM

## 2023-11-15 DIAGNOSIS — Z98.89 OTHER SPECIFIED POSTPROCEDURAL STATES: Chronic | ICD-10-CM

## 2023-11-15 PROCEDURE — 71046 X-RAY EXAM CHEST 2 VIEWS: CPT | Mod: 26

## 2023-11-15 PROCEDURE — 71046 X-RAY EXAM CHEST 2 VIEWS: CPT

## 2023-11-27 ENCOUNTER — APPOINTMENT (OUTPATIENT)
Dept: VASCULAR SURGERY | Facility: CLINIC | Age: 83
End: 2023-11-27
Payer: MEDICARE

## 2023-11-27 VITALS
HEIGHT: 68 IN | SYSTOLIC BLOOD PRESSURE: 103 MMHG | DIASTOLIC BLOOD PRESSURE: 66 MMHG | HEART RATE: 106 BPM | WEIGHT: 165 LBS | BODY MASS INDEX: 25.01 KG/M2

## 2023-11-27 PROCEDURE — 93970 EXTREMITY STUDY: CPT

## 2023-11-27 PROCEDURE — 99214 OFFICE O/P EST MOD 30 MIN: CPT

## 2023-12-01 ENCOUNTER — APPOINTMENT (OUTPATIENT)
Dept: NEPHROLOGY | Facility: CLINIC | Age: 83
End: 2023-12-01

## 2023-12-01 PROBLEM — Z00.00 ENCOUNTER FOR PREVENTIVE HEALTH EXAMINATION: Status: ACTIVE | Noted: 2023-12-01

## 2023-12-05 ENCOUNTER — NON-APPOINTMENT (OUTPATIENT)
Age: 83
End: 2023-12-05

## 2023-12-05 ENCOUNTER — APPOINTMENT (OUTPATIENT)
Dept: UROLOGY | Facility: CLINIC | Age: 83
End: 2023-12-05

## 2024-03-14 ENCOUNTER — EMERGENCY (EMERGENCY)
Facility: HOSPITAL | Age: 84
LOS: 1 days | Discharge: HOME | End: 2024-03-14
Attending: EMERGENCY MEDICINE | Admitting: EMERGENCY MEDICINE
Payer: MEDICARE

## 2024-03-14 VITALS
DIASTOLIC BLOOD PRESSURE: 76 MMHG | TEMPERATURE: 98 F | RESPIRATION RATE: 20 BRPM | OXYGEN SATURATION: 98 % | HEART RATE: 69 BPM | SYSTOLIC BLOOD PRESSURE: 144 MMHG

## 2024-03-14 VITALS
HEIGHT: 69 IN | DIASTOLIC BLOOD PRESSURE: 67 MMHG | SYSTOLIC BLOOD PRESSURE: 132 MMHG | HEART RATE: 90 BPM | RESPIRATION RATE: 20 BRPM | TEMPERATURE: 98 F | OXYGEN SATURATION: 97 % | WEIGHT: 166.89 LBS

## 2024-03-14 DIAGNOSIS — L08.0 PYODERMA: ICD-10-CM

## 2024-03-14 DIAGNOSIS — Z91.040 LATEX ALLERGY STATUS: ICD-10-CM

## 2024-03-14 DIAGNOSIS — Z95.5 PRESENCE OF CORONARY ANGIOPLASTY IMPLANT AND GRAFT: Chronic | ICD-10-CM

## 2024-03-14 DIAGNOSIS — Z98.890 OTHER SPECIFIED POSTPROCEDURAL STATES: ICD-10-CM

## 2024-03-14 DIAGNOSIS — I25.10 ATHEROSCLEROTIC HEART DISEASE OF NATIVE CORONARY ARTERY WITHOUT ANGINA PECTORIS: ICD-10-CM

## 2024-03-14 DIAGNOSIS — E78.5 HYPERLIPIDEMIA, UNSPECIFIED: ICD-10-CM

## 2024-03-14 DIAGNOSIS — E11.9 TYPE 2 DIABETES MELLITUS WITHOUT COMPLICATIONS: ICD-10-CM

## 2024-03-14 DIAGNOSIS — Z98.89 OTHER SPECIFIED POSTPROCEDURAL STATES: Chronic | ICD-10-CM

## 2024-03-14 DIAGNOSIS — N40.0 BENIGN PROSTATIC HYPERPLASIA WITHOUT LOWER URINARY TRACT SYMPTOMS: ICD-10-CM

## 2024-03-14 DIAGNOSIS — R60.0 LOCALIZED EDEMA: ICD-10-CM

## 2024-03-14 DIAGNOSIS — I10 ESSENTIAL (PRIMARY) HYPERTENSION: ICD-10-CM

## 2024-03-14 LAB
ALBUMIN SERPL ELPH-MCNC: 3.8 G/DL — SIGNIFICANT CHANGE UP (ref 3.3–5)
ALP SERPL-CCNC: 151 U/L — HIGH (ref 40–120)
ALT FLD-CCNC: SIGNIFICANT CHANGE UP U/L (ref 10–45)
ANION GAP SERPL CALC-SCNC: 9 MMOL/L — SIGNIFICANT CHANGE UP (ref 5–17)
APTT BLD: 29.4 SEC — SIGNIFICANT CHANGE UP (ref 24.5–35.6)
AST SERPL-CCNC: SIGNIFICANT CHANGE UP U/L (ref 10–40)
BASOPHILS # BLD AUTO: 0.05 K/UL — SIGNIFICANT CHANGE UP (ref 0–0.2)
BASOPHILS NFR BLD AUTO: 0.7 % — SIGNIFICANT CHANGE UP (ref 0–2)
BILIRUB SERPL-MCNC: 0.5 MG/DL — SIGNIFICANT CHANGE UP (ref 0.2–1.2)
BUN SERPL-MCNC: 58 MG/DL — HIGH (ref 7–23)
CALCIUM SERPL-MCNC: 10.6 MG/DL — HIGH (ref 8.4–10.5)
CHLORIDE SERPL-SCNC: 108 MMOL/L — SIGNIFICANT CHANGE UP (ref 96–108)
CO2 SERPL-SCNC: 23 MMOL/L — SIGNIFICANT CHANGE UP (ref 22–31)
CREAT SERPL-MCNC: 2.27 MG/DL — HIGH (ref 0.5–1.3)
EGFR: 27 ML/MIN/1.73M2 — LOW
EOSINOPHIL # BLD AUTO: 0.42 K/UL — SIGNIFICANT CHANGE UP (ref 0–0.5)
EOSINOPHIL NFR BLD AUTO: 5.8 % — SIGNIFICANT CHANGE UP (ref 0–6)
GLUCOSE SERPL-MCNC: 119 MG/DL — HIGH (ref 70–99)
HCT VFR BLD CALC: 34.3 % — LOW (ref 39–50)
HGB BLD-MCNC: 10.8 G/DL — LOW (ref 13–17)
IMM GRANULOCYTES NFR BLD AUTO: 0.1 % — SIGNIFICANT CHANGE UP (ref 0–0.9)
INR BLD: 1 — SIGNIFICANT CHANGE UP (ref 0.85–1.18)
LYMPHOCYTES # BLD AUTO: 1.05 K/UL — SIGNIFICANT CHANGE UP (ref 1–3.3)
LYMPHOCYTES # BLD AUTO: 14.4 % — SIGNIFICANT CHANGE UP (ref 13–44)
MCHC RBC-ENTMCNC: 28.3 PG — SIGNIFICANT CHANGE UP (ref 27–34)
MCHC RBC-ENTMCNC: 31.5 GM/DL — LOW (ref 32–36)
MCV RBC AUTO: 90 FL — SIGNIFICANT CHANGE UP (ref 80–100)
MONOCYTES # BLD AUTO: 0.78 K/UL — SIGNIFICANT CHANGE UP (ref 0–0.9)
MONOCYTES NFR BLD AUTO: 10.7 % — SIGNIFICANT CHANGE UP (ref 2–14)
NEUTROPHILS # BLD AUTO: 4.99 K/UL — SIGNIFICANT CHANGE UP (ref 1.8–7.4)
NEUTROPHILS NFR BLD AUTO: 68.3 % — SIGNIFICANT CHANGE UP (ref 43–77)
NRBC # BLD: 0 /100 WBCS — SIGNIFICANT CHANGE UP (ref 0–0)
PLATELET # BLD AUTO: 220 K/UL — SIGNIFICANT CHANGE UP (ref 150–400)
POTASSIUM SERPL-MCNC: 5.3 MMOL/L — SIGNIFICANT CHANGE UP (ref 3.5–5.3)
POTASSIUM SERPL-MCNC: SIGNIFICANT CHANGE UP MMOL/L (ref 3.5–5.3)
POTASSIUM SERPL-SCNC: 5.3 MMOL/L — SIGNIFICANT CHANGE UP (ref 3.5–5.3)
POTASSIUM SERPL-SCNC: SIGNIFICANT CHANGE UP MMOL/L (ref 3.5–5.3)
PROT SERPL-MCNC: 6.9 G/DL — SIGNIFICANT CHANGE UP (ref 6–8.3)
PROTHROM AB SERPL-ACNC: 11.4 SEC — SIGNIFICANT CHANGE UP (ref 9.5–13)
RBC # BLD: 3.81 M/UL — LOW (ref 4.2–5.8)
RBC # FLD: 17.2 % — HIGH (ref 10.3–14.5)
SODIUM SERPL-SCNC: 140 MMOL/L — SIGNIFICANT CHANGE UP (ref 135–145)
WBC # BLD: 7.3 K/UL — SIGNIFICANT CHANGE UP (ref 3.8–10.5)
WBC # FLD AUTO: 7.3 K/UL — SIGNIFICANT CHANGE UP (ref 3.8–10.5)

## 2024-03-14 PROCEDURE — 85025 COMPLETE CBC W/AUTO DIFF WBC: CPT

## 2024-03-14 PROCEDURE — 85730 THROMBOPLASTIN TIME PARTIAL: CPT

## 2024-03-14 PROCEDURE — 80053 COMPREHEN METABOLIC PANEL: CPT

## 2024-03-14 PROCEDURE — 84132 ASSAY OF SERUM POTASSIUM: CPT

## 2024-03-14 PROCEDURE — 36415 COLL VENOUS BLD VENIPUNCTURE: CPT

## 2024-03-14 PROCEDURE — 99285 EMERGENCY DEPT VISIT HI MDM: CPT

## 2024-03-14 PROCEDURE — 85610 PROTHROMBIN TIME: CPT

## 2024-03-14 PROCEDURE — 99284 EMERGENCY DEPT VISIT MOD MDM: CPT | Mod: 25

## 2024-03-14 PROCEDURE — 73660 X-RAY EXAM OF TOE(S): CPT | Mod: 26,RT

## 2024-03-14 PROCEDURE — 73660 X-RAY EXAM OF TOE(S): CPT

## 2024-03-14 NOTE — ED PROVIDER NOTE - NSFOLLOWUPINSTRUCTIONS_ED_ALL_ED_FT
Follow up with nephrologist recommended below regarding creatinine.  Follow up with podiatry:  Pt can follow-up on outpatient basis w/ any podiatrist at the following facility within 1 week of Discharge:   [facility] ([facility])  [facility] Location   [address] [place] [address], [address]  [place] [address]  [phone]; [phone]  [email or web address]    Do not remove dressing until seen by podiatry.  Stay well and feel better.    Acute Kidney Injury, Adult  Body outline of a person, showing the kidneys.  Acute kidney injury is a sudden decrease in the ability of the kidneys to do what they are supposed to do. The kidneys are a pair of organs that:  Make urine.  Make hormones.  Keep the right amount of fluids and chemicals in the body.  This condition ranges from mild to severe. Over time, it may turn into long-term (chronic) kidney disease. Finding and treating the injury early may keep it from turning into chronic kidney disease.    What are the causes?  Common causes of this condition include:  A problem with blood flow to the kidneys. This may be caused by:  Low blood pressure, shock, or severe dehydration.  Severe blood loss.  Heart and blood vessel disease.  Severe burns.  Liver disease.  Direct damage to the kidneys. This may be caused by:  Certain medicines or toxins.  Kidney disease.  Contrast dye used in imaging tests.  An infection of the kidney or bloodstream.  Problems from surgery.  Trauma to the kidney area.  Organ failure. This includes heart or liver failure.  A sudden block in urine flow. This may be caused by:  Cancer.  Kidney stones.  An enlarged prostate.  What increases the risk?  You may be more likely to develop this condition if:  You are older than 65 years of age.  You are female.  You are in the hospital. You may be even more at risk if you are very sick.  You have certain conditions. These may include:  Chronic kidney or liver disease.  Diabetes.  Heart disease and heart failure.  Lung disease.  What are the signs or symptoms?  This condition may not cause symptoms until it becomes severe. If it does, symptoms may include:  Feeling very tired or having trouble staying awake.  Nausea or vomiting.  Swelling (edema) of the face, legs, ankles, or feet.  Pain in your abdomen, back, or along the side of your back (flank).  Urine changes. You may:  Make little or no urine.  Pass urine with a weak flow.  Muscle twitches and cramps. These are most often in the legs.  Confusion or trouble focusing.  Not feeling the urge to eat.  Fever.  How is this diagnosed?  This condition may be diagnosed based on your symptoms and your medical history. You may have a physical exam done.    You may also have tests, such as:  Blood tests.  Urine tests.  Imaging tests.  A kidney biopsy. This is when a sample of kidney tissue is removed and looked at under a microscope.  How is this treated?  Treatment depends on the cause and how severe the condition is. In mild cases, treatment may not be needed. The kidneys may heal on their own.    In severe cases, treatment may include:  Treating the cause of the kidney injury. This may mean that you have to change your medicines or the doses you take.  Getting fluids through an IV tube.  Having a flexible tube (catheter) put in. This tube will drain urine and prevent blockages.  Trying to keep problems from starting. This may mean not using certain medicines or not having tests done that could cause more injury.  In some cases, you may also need:  Dialysis or continuous renal replacement therapy (CRRT). This treatment uses a machine to do the job of the kidneys.  Surgery. This may be done to repair a damaged kidney. It could also be done to remove a blockage in the urinary tract.  Follow these instructions at home:  Medicines    Take over-the-counter and prescription medicines only as told by your health care provider.  Do not take new medicines unless approved by your health care provider. Many medicines can make kidney damage worse.  Do not take vitamin or mineral supplements unless approved by your health care provider. Some of these can make kidney damage worse.  Lifestyle    A person riding a bicycle.  Make changes to your diet as told by your health care provider. You may need to eat less protein.  Get to, and stay at, a healthy weight. If you need help, ask your health care provider.  Start or keep up an exercise plan. Exercise at least 30 minutes a day, 5 days a week.  Do not use any products that contain nicotine or tobacco. These products include cigarettes, chewing tobacco, and vaping devices, such as e-cigarettes. If you need help quitting, ask your health care provider.  General instructions    A person checking their blood pressure.  Keep track of your blood pressure. Tell your health care provider if you notice any changes.  Keep your vaccines up to date. Ask your health care provider which vaccines you need.  Keep all follow-up visits. Your health care provider will need to monitor your kidneys.  Where to find support  American Association of Kidney Patients: aakp.org  American Kidney Fund: akfinc.org  Where to find more information  National Kidney Foundation: kidney.org  [employer] [place]:  LifeOptions: lifeoptions.org  Kidney School: kidneyschool.org  Contact a health care provider if:  Your symptoms get worse.  You have new symptoms, such as:  Headaches.  Skin that is darker or lighter than normal.  Easy bruising.  Feeling itchy.  Hiccups.  Lack of menstrual periods.  You have a fever.  Get help right away if:  You have signs of severe kidney disease, such as:  Chest pain.  Shortness of breath.  Seizures.  You have pain or bleeding when you pass urine.  You make little or no urine.  These symptoms may be an emergency. Get help right away. Call 911.  Do not wait to see if the symptoms will go away.  Do not drive yourself to the hospital.  This information is not intended to replace advice given to you by your health care provider. Make sure you discuss any questions you have with your health care provider.    Document Revised: 07/07/2023 Document Reviewed: 07/07/2023  [employer] Patient Education © 2024 [employer]  [employer] logo  Terms and Conditions  Privacy Policy  Editorial Policy  All content on this site: Copyright © 2024 Elsevier, its licensors, and contributors. All rights are reserved, including those for text and data mining, AI training, and similar technologies. For all open access content, the Creative Commons licensing terms apply.  Cookies are used by this site. To decline or learn more, visit our Cookies page.

## 2024-03-14 NOTE — ED PROVIDER NOTE - PHYSICAL EXAMINATION
VITAL SIGNS: I have reviewed nursing notes and confirm.  CONSTITUTIONAL: Well appearing, in no acute distress.   SKIN:  warm and dry, no acute rash.   HEAD:  normocephalic, atraumatic.  EYES: EOM intact; conjunctiva and sclera clear.  ENT: No nasal discharge; airway clear.   NECK: Supple.  CARD: S1, S2, Regular rate and rhythm.   RESP:  Clear to auscultation b/l, no wheezes, rales or rhonchi.  EXT: Normal ROM. + pitting edema of b/l lower ext. R great toe: s/p partial removal of lateral side of nail w/ oozing of blood, no pus. No surrounding erythema. Pulses intact and equal b/l.  NEURO: Alert, oriented, motor/ sensation grossly intact.

## 2024-03-14 NOTE — ED PROVIDER NOTE - OBJECTIVE STATEMENT
Pt is an 83yo m, h/o htn, hld, dm, cad, bph, gout, who p/w bleeding from r great toe starting 3d ago, Denies any trauma to toe. Pt saw his podiatrist at the time and had part of his toenail removed w/ resolution of bleeding. Toe began to bleed again today. Was seen by podiatrist again and referred to ed for evaluation. No f/c, pus drainage from toe. Pt also c/o b/l leg swelling for several months, denies any cp, sob, palp, difficulty w/ urination...

## 2024-03-14 NOTE — ED ADULT NURSE NOTE - NSFALLUNIVINTERV_ED_ALL_ED
Bed/Stretcher in lowest position, wheels locked, appropriate side rails in place/Call bell, personal items and telephone in reach/Instruct patient to call for assistance before getting out of bed/chair/stretcher/Non-slip footwear applied when patient is off stretcher/[place] to call system/Physically safe environment - no spills, clutter or unnecessary equipment/Purposeful proactive rounding/Room/bathroom lighting operational, light cord in reach

## 2024-03-14 NOTE — ED PROVIDER NOTE - PROGRESS NOTE DETAILS
Leon - signed out me pending Cr results and K -  Cr 2.2, K 5.3  Pt aware and requests new nephrologist for follow up  Also aware of podiatry follow up

## 2024-03-14 NOTE — ED PROVIDER NOTE - PATIENT PORTAL LINK FT
You can access the FollowMyHealth Patient Portal offered by [facility] by registering at the following website: [email or web address]. By joining [employer]’s FollowMyHealth portal, you will also be able to view your health information using other applications (apps) compatible with our system.

## 2024-03-14 NOTE — ED ADULT NURSE NOTE - CHIEF COMPLAINT QUOTE
Pt presents to ED here for wound check and bleeding on the R big toe. PT was seen at podiatrist today and had it bandaged however bleeding inst subsiding. Gauze applied. Pt also noted ot have b.l lower leg swelling. PMHx of stents not on any blood thinner. Pt A&Ox4, conversive in full sentences and ambulatory. Denies CP or SOB.

## 2024-03-14 NOTE — ED ADULT NURSE NOTE - OBJECTIVE STATEMENT
Pt A&Ox4 and able to speak in complete sentences. Pt breathing even and unlabored with equal chest rise and fall. pt arrived d/t bleeding on right toe. pt currently has bilateral swelling in lower extremities. pt pmh of blood thinner use. pt ambulatory with steady gait. pt denies numbness, tingling, sob, fevers, chills.

## 2024-03-14 NOTE — CONSULT NOTE ADULT - SUBJECTIVE AND OBJECTIVE BOX
Attending: Dr. 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 Pt states he got a nail avulsion from an outpatient Podiatrist in [place] last week. He does not recall if he did it for an infection or a pyoderma granuloma. Pt states he started bleeding last week which went away but today the bleeding increased in severity and he presented to the emergency room. He does not recall any trauma to the nail. He was the doctor today who instructed him to come to the ER for the bleeding.          Review of systems negative except per HPI and as stated below  General:	 no weakness; no fevers, no chills  Skin/Breast: no rash  Respiratory and Thorax: no SOB, no cough  Cardiovascular:	No chest pain  Gastrointestinal:	 no nausea, vomiting , diarrhea  Genitourinary:	no dysuria, no difficulty urinating, no hematuria  Musculoskeletal:	no weakness, no joint swelling/pain  Neurological:	no focal weakness/numbness  Endocrine:	no polyuria, no polydipsia    PAST MEDICAL & SURGICAL HISTORY:  Diabetes      BPH (benign prostatic hypertrophy)      Hyperlipidemia      Gout      HTN (hypertension)      CAD (coronary artery disease)      H/O heart artery stent      H/O shoulder surgery      History of back surgery        Home Medications:  allopurinol:  orally  (04 Mar 2016 14:44)  aspirin 81 mg oral tablet:  orally  (04 Mar 2016 14:44)  clopidogrel:  orally  (04 Mar 2016 14:44)  Lipitor:  orally  (04 Mar 2016 14:44)  metFORMIN:  orally  (04 Mar 2016 14:44)  tamsulosin:  orally  (04 Mar 2016 14:44)    Allergies    latex (Blisters)  No Known Drug Allergies    Intolerances      FAMILY HISTORY:    Social History:       LABS                        10.8   7.30  )-----------( 220      ( 14 Mar 2024 18:16 )             34.3           PT/INR - ( 14 Mar 2024 18:16 )   PT: 11.4 sec;   INR: 1.00          PTT - ( 14 Mar 2024 18:16 )  PTT:29.4 sec    Vital Signs Last 24 Hrs  T(C): 36.8 (14 Mar 2024 16:08), Max: 36.8 (14 Mar 2024 16:08)  T(F): 98.2 (14 Mar 2024 16:08), Max: 98.2 (14 Mar 2024 16:08)  HR: 90 (14 Mar 2024 16:08) (90 - 90)  BP: 132/67 (14 Mar 2024 16:08) (132/67 - 132/67)  BP(mean): --  RR: 20 (14 Mar 2024 16:08) (20 - 20)  SpO2: 97% (14 Mar 2024 16:08) (97% - 97%)    Parameters below as of 14 Mar 2024 16:08  Patient On (Oxygen Delivery Method): room air        PHYSICAL EXAM  General: NAD, AA0x3    Lower Extremity Focused:  Vasc: DP and PT 2/4 b/l. TG: Warm to Cool. Edema b/l.   Derm:  R foot: Distal and lateral removal of nail of hallux. Firm nodular soft mass noted at distal lateral aspect of R Hallux with bleeding  L foot  Neuro: Protective sensation diminished  MSK: rectus feet b/l    Gait Examination:    RADIOLOGY  XR wet read: No obvious evidence of trauma or FB                       Attending: Dr. 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 Pt states he got a nail avulsion from an outpatient Podiatrist in [place] last week. He does not recall if he did it for an infection or a pyoderma granuloma. Pt states he started bleeding last week which went away but today the bleeding increased in severity and he presented to the emergency room. He does not recall any trauma to the nail. He was the doctor today who instructed him to come to the ER for the bleeding.          Review of systems negative except per HPI and as stated below  General:	 no weakness; no fevers, no chills  Skin/Breast: no rash  Respiratory and Thorax: no SOB, no cough  Cardiovascular:	No chest pain  Gastrointestinal:	 no nausea, vomiting , diarrhea  Genitourinary:	no dysuria, no difficulty urinating, no hematuria  Musculoskeletal:	no weakness, no joint swelling/pain  Neurological:	no focal weakness/numbness  Endocrine:	no polyuria, no polydipsia    PAST MEDICAL & SURGICAL HISTORY:  Diabetes      BPH (benign prostatic hypertrophy)      Hyperlipidemia      Gout      HTN (hypertension)      CAD (coronary artery disease)      H/O heart artery stent      H/O shoulder surgery      History of back surgery        Home Medications:  allopurinol:  orally  (04 Mar 2016 14:44)  aspirin 81 mg oral tablet:  orally  (04 Mar 2016 14:44)  clopidogrel:  orally  (04 Mar 2016 14:44)  Lipitor:  orally  (04 Mar 2016 14:44)  metFORMIN:  orally  (04 Mar 2016 14:44)  tamsulosin:  orally  (04 Mar 2016 14:44)    Allergies    latex (Blisters)  No Known Drug Allergies    Intolerances      FAMILY HISTORY:    Social History:       LABS                        10.8   7.30  )-----------( 220      ( 14 Mar 2024 18:16 )             34.3           PT/INR - ( 14 Mar 2024 18:16 )   PT: 11.4 sec;   INR: 1.00          PTT - ( 14 Mar 2024 18:16 )  PTT:29.4 sec    Vital Signs Last 24 Hrs  T(C): 36.8 (14 Mar 2024 16:08), Max: 36.8 (14 Mar 2024 16:08)  T(F): 98.2 (14 Mar 2024 16:08), Max: 98.2 (14 Mar 2024 16:08)  HR: 90 (14 Mar 2024 16:08) (90 - 90)  BP: 132/67 (14 Mar 2024 16:08) (132/67 - 132/67)  BP(mean): --  RR: 20 (14 Mar 2024 16:08) (20 - 20)  SpO2: 97% (14 Mar 2024 16:08) (97% - 97%)    Parameters below as of 14 Mar 2024 16:08  Patient On (Oxygen Delivery Method): room air        PHYSICAL EXAM  General: NAD, AA0x3    Lower Extremity Focused:  Vasc: DP and PT 2/4 b/l. TG: Warm to Cool. Edema b/l. No erythema  Derm:  R foot: Distal and lateral removal of nail of hallux. Firm nodular soft mass noted at distal lateral aspect of R Hallux with bleeding. Sang drainage.   L foot: no open lesions   Neuro: Protective sensation diminished  MSK: rectus feet b/l    Gait Examination:    RADIOLOGY  XR wet read: No obvious evidence of trauma or FB

## 2024-03-14 NOTE — ED PROVIDER NOTE - CARE PROVIDER_API CALL
Salomón Araujo  Nephrology  [address]  [place] [address]  Phone: [phone]  Fax: [phone]  Follow Up Time:

## 2024-03-14 NOTE — CONSULT NOTE ADULT - ASSESSMENT
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 VSS. No cardinal signs of infection present.    Plan:  -Cauterized pyoderma granuloma with Silver nitrate  -Flushed area with Betadine/NS 10 CC  -Dressed with Betadine, DSD, light ACE  -XR reviewed    Pt can follow-up on outpatient basis w/ any podiatrist at the following facility within 1 week of Discharge:   [facility] [place] ([facility])  [facility] Location   [address] [place] [address], [address]  [place], [place] [id number] [phone]; [phone]  [email or web address]

## 2024-03-14 NOTE — ED ADULT TRIAGE NOTE - CHIEF COMPLAINT QUOTE
Pt presents to ED here for wound check and bleeding on the R big toe. PT was seen at podiatrist today and had it bandaged however bleeding inst subsiding. Gauze applied. Pt also noted ot have b.l lower leg swelling. PMHx of stents not on any blood thinner. Pt A&Ox4, conversive in full sentences and ambulatory. Pt presents to ED here for wound check and bleeding on the R big toe. PT was seen at podiatrist today and had it bandaged however bleeding inst subsiding. Gauze applied. Pt also noted ot have b.l lower leg swelling. PMHx of stents not on any blood thinner. Pt A&Ox4, conversive in full sentences and ambulatory. Denies CP or SOB.

## 2024-03-14 NOTE — ED PROVIDER NOTE - CLINICAL SUMMARY MEDICAL DECISION MAKING FREE TEXT BOX
Impression: Pt is an 83yo m, h/o htn, hld, dm, cad, bph, gout, who p/w bleeding from r great toe starting 3d ago, Denies any trauma to toe. Pt saw his podiatrist at the time and had part of his toenail removed w/ resolution of bleeding. Toe began to bleed again today. Was seen by podiatrist again and referred to ed for evaluation. No f/c, pus drainage from toe. Afebrile. HDS. + oozing of blood from lateral aspect of r great toe nailbed in region of prior partial removal of toe. NVI. No concern for infection. Xrays of toe neg for acute fx/ injury. Podiatry consult requested for evaluation. Will check labs to assess renal function given b/l leg edema and hg/hct. Dispo pending podiatry consult recommendations. Pt s/o to Dr. Quintero.

## 2024-04-01 RX ORDER — FUROSEMIDE 40 MG/1
40 TABLET ORAL
Refills: 0 | Status: ACTIVE | COMMUNITY

## 2024-04-02 ENCOUNTER — APPOINTMENT (OUTPATIENT)
Dept: NEPHROLOGY | Facility: CLINIC | Age: 84
End: 2024-04-02
Payer: MEDICARE

## 2024-04-02 ENCOUNTER — LABORATORY RESULT (OUTPATIENT)
Age: 84
End: 2024-04-02

## 2024-04-02 VITALS — HEART RATE: 76 BPM | DIASTOLIC BLOOD PRESSURE: 89 MMHG | RESPIRATION RATE: 14 BRPM | SYSTOLIC BLOOD PRESSURE: 161 MMHG

## 2024-04-02 VITALS — WEIGHT: 160 LBS | BODY MASS INDEX: 24.33 KG/M2

## 2024-04-02 DIAGNOSIS — N18.9 CHRONIC KIDNEY DISEASE, UNSPECIFIED: ICD-10-CM

## 2024-04-02 DIAGNOSIS — M10.9 GOUT, UNSPECIFIED: ICD-10-CM

## 2024-04-02 PROCEDURE — G2211 COMPLEX E/M VISIT ADD ON: CPT

## 2024-04-02 PROCEDURE — 93970 EXTREMITY STUDY: CPT | Mod: 59

## 2024-04-02 PROCEDURE — 99205 OFFICE O/P NEW HI 60 MIN: CPT

## 2024-04-02 PROCEDURE — 93308 TTE F-UP OR LMTD: CPT | Mod: 59

## 2024-04-02 PROCEDURE — 76775 US EXAM ABDO BACK WALL LIM: CPT | Mod: 59

## 2024-04-02 NOTE — REASON FOR VISIT
[Initial Eval - Existing Diagnosis] : an initial evaluation of an existing diagnosis [Procedure: _________] : a [unfilled] procedure visit

## 2024-04-02 NOTE — PHYSICAL EXAM
[General Appearance - Alert] : alert [General Appearance - In No Acute Distress] : in no acute distress [PERRL With Normal Accommodation] : pupils were equal in size, round, and reactive to light [Sclera] : the sclera and conjunctiva were normal [Outer Ear] : the ears and nose were normal in appearance [Extraocular Movements] : extraocular movements were intact [Oropharynx] : the oropharynx was normal [Neck Appearance] : the appearance of the neck was normal [Neck Cervical Mass (___cm)] : no neck mass was observed [Jugular Venous Distention Increased] : there was no jugular-venous distention [Exaggerated Use Of Accessory Muscles For Inspiration] : no accessory muscle use [Respiration, Rhythm And Depth] : normal respiratory rhythm and effort [Auscultation Breath Sounds / Voice Sounds] : lungs were clear to auscultation bilaterally [Heart Rate And Rhythm] : heart rate was normal and rhythm regular [Heart Sounds] : normal S1 and S2 [Heart Sounds Gallop] : no gallops [Murmurs] : no murmurs [Heart Sounds Pericardial Friction Rub] : no pericardial rub [Edema] : there was no peripheral edema [Veins - Varicosity Changes] : there were no varicosital changes [Bowel Sounds] : normal bowel sounds [Abdomen Soft] : soft [Abdomen Tenderness] : non-tender [Abdomen Mass (___ Cm)] : no abdominal mass palpated [No CVA Tenderness] : no ~M costovertebral angle tenderness [No Spinal Tenderness] : no spinal tenderness [Abnormal Walk] : normal gait [Involuntary Movements] : no involuntary movements were seen [Skin Color & Pigmentation] : normal skin color and pigmentation [Skin Turgor] : normal skin turgor [] : no rash [Cranial Nerves] : cranial nerves 2-12 were intact [No Focal Deficits] : no focal deficits [Affect] : the affect was normal [Mood] : the mood was normal

## 2024-04-02 NOTE — PHYSICAL EXAM
[General Appearance - Alert] : alert [General Appearance - In No Acute Distress] : in no acute distress [Sclera] : the sclera and conjunctiva were normal [PERRL With Normal Accommodation] : pupils were equal in size, round, and reactive to light [Oropharynx] : the oropharynx was normal [Outer Ear] : the ears and nose were normal in appearance [Extraocular Movements] : extraocular movements were intact [Neck Cervical Mass (___cm)] : no neck mass was observed [Neck Appearance] : the appearance of the neck was normal [Jugular Venous Distention Increased] : there was no jugular-venous distention [Exaggerated Use Of Accessory Muscles For Inspiration] : no accessory muscle use [Respiration, Rhythm And Depth] : normal respiratory rhythm and effort [Heart Rate And Rhythm] : heart rate was normal and rhythm regular [Auscultation Breath Sounds / Voice Sounds] : lungs were clear to auscultation bilaterally [Heart Sounds Gallop] : no gallops [Heart Sounds] : normal S1 and S2 [Murmurs] : no murmurs [Heart Sounds Pericardial Friction Rub] : no pericardial rub [Edema] : there was no peripheral edema [Bowel Sounds] : normal bowel sounds [Veins - Varicosity Changes] : there were no varicosital changes [Abdomen Soft] : soft [Abdomen Tenderness] : non-tender [Abdomen Mass (___ Cm)] : no abdominal mass palpated [No CVA Tenderness] : no ~M costovertebral angle tenderness [Abnormal Walk] : normal gait [No Spinal Tenderness] : no spinal tenderness [Involuntary Movements] : no involuntary movements were seen [Skin Color & Pigmentation] : normal skin color and pigmentation [Skin Turgor] : normal skin turgor [] : no rash [Cranial Nerves] : cranial nerves 2-12 were intact [No Focal Deficits] : no focal deficits [Affect] : the affect was normal [Mood] : the mood was normal

## 2024-04-02 NOTE — HISTORY OF PRESENT ILLNESS
[FreeTextEntry1] : Patient is an 85 yo M with HTN, BPH, hx of nephrolithiasis, who presents for evaluation of CKD, weight loss, and LE edema  Patient has hx of bilateral DVTs s/p eliquis for six months, resolved now off for about six months. May have been triggered by travel to [place] and back but unclear timing, was not worked up past that.   Has had LE edema for months, has not improved despite raising legs and ace bandages and has caused blisters of skin, no particular trigger known.   Has had 80 pound weight loss over the past year without any changes in diet or exercise. All clothes very baggy now.   Nephrologic History: Stones: In the past, none currently, high water intake NSAID use:  oxycodone with pain management HTN: Elevated today, will check labs to determine medications for management DM: Pre Prior nephrologist: None Kidney biopsy: No Reduced kidney mass (prematurity): Not premature  Pre-eclampsia: Male Urination history: Still some prostatic symptoms  Family Hx: No first degree rleatives with kidneys disease or known cancer. Children alive and welll has grand kids Surgical Hx: Spine, knee - chronic spine pain Social Hx: No etoh, ivdu, smoking. Patient works in [employer].  Allergies: NKDA Meds:  as recd

## 2024-04-02 NOTE — REVIEW OF SYSTEMS
[As noted in HPI] : as noted in HPI [Lower Ext Edema] : lower extremity edema [Arthralgias] : arthralgias [Joint Stiffness] : joint stiffness [Limb Pain] : limb pain [As Noted in HPI] : as noted in HPI [Limb Swelling] : limb swelling [Difficulty Walking] : difficulty walking [Negative] : Heme/Lymph

## 2024-04-02 NOTE — ASSESSMENT
[FreeTextEntry1] : A Focused Ultrasound of the: [   ] Limited Chest [ X  ] Limited Echo [  X ] Limited Retroperitoneal [   ] Limited Abdominal [ X  ] Limited Vascular   Indication: SOB, weight, loss, LE edema, JONH on CKD   Acquisition: Bilateral short and long veiws of the kidneys obtained. Bilateral LE DVT study obtained. PLAX, PSAX, apical, subxiphoid views obtained The study was technically limited for the following reasons: Body habitus, inability ot lay down flat due to back pain Findings: Bilateral DVT negative in legs Bilateral kidneys echogenicy, smaller for height consistent with CKD progression. No stones or hydronephrosis, L kidney two simple cysts Heat  Normal EF, no pericardial effusion, no obvious wall motion abnormalities or signs of volume overload    Interpretation: Normal POCUS of heart, negattive dvt study, no sequelae of JONH, consistent with CKD   The following follow-up studies are indicated: None   Archival System: Images were saved to [facility]

## 2024-04-02 NOTE — HISTORY OF PRESENT ILLNESS
[FreeTextEntry1] : Patient is an 83 yo M with HTN, BPH, hx of nephrolithiasis, who presents for evaluation of CKD, weight loss, and LE edema  Patient has hx of bilateral DVTs s/p eliquis for six months, resolved now off for about six months. May have been triggered by travel to [place] and back but unclear timing, was not worked up past that.   Has had LE edema for months, has not improved despite raising legs and ace bandages and has caused blisters of skin, no particular trigger known.   Has had 80 pound weight loss over the past year without any changes in diet or exercise. All clothes very baggy now.   Nephrologic History: Stones: In the past, none currently, high water intake NSAID use:  oxycodone with pain management HTN: Elevated today, will check labs to determine medications for management DM: Pre Prior nephrologist: None Kidney biopsy: No Reduced kidney mass (prematurity): Not premature  Pre-eclampsia: Male Urination history: Still some prostatic symptoms  Family Hx: No first degree rleatives with kidneys disease or known cancer. Children alive and welll has grand kids Surgical Hx: Spine, knee - chronic spine pain Social Hx: No etoh, ivdu, smoking. Patient works in [employer].  Allergies: NKDA Meds:  as recd

## 2024-04-02 NOTE — ASSESSMENT
[FreeTextEntry1] : A Focused Ultrasound of the: [   ] Limited Chest [ X  ] Limited Echo [  X ] Limited Retroperitoneal [   ] Limited Abdominal [ X  ] Limited Vascular   Indication: SOB, weight, loss, LE edema, JONH on CKD   Acquisition: Bilateral short and long veiws of the kidneys obtained. Bilateral LE DVT study obtained. PLAX, PSAX, apical, subxiphoid views obtained The study was technically limited for the following reasons: Body habitus, inability ot lay down flat due to back pain Findings: Bilateral DVT negative in legs Bilateral kidneys echogenicy, smaller for height consistent with CKD progression. No stones or hydronephrosis, L kidney two simple cysts Heat  Normal EF, no pericardial effusion, no obvious wall motion abnormalities or signs of volume overload    Interpretation: Normal POCUS of heart, negattive dvt study, no sequelae of JONH, consistent with CKD   The following follow-up studies are indicated: None   Archival System: Images were saved to [facility]

## 2024-04-03 ENCOUNTER — APPOINTMENT (OUTPATIENT)
Dept: VASCULAR SURGERY | Facility: CLINIC | Age: 84
End: 2024-04-03
Payer: MEDICARE

## 2024-04-03 DIAGNOSIS — I87.009 POSTTHROMBOTIC SYNDROME W/OUT COMPLICATIONS OF UNSPECIFIED EXTREMITY: ICD-10-CM

## 2024-04-03 PROCEDURE — 93970 EXTREMITY STUDY: CPT

## 2024-04-03 PROCEDURE — 99213 OFFICE O/P EST LOW 20 MIN: CPT

## 2024-04-08 LAB
25(OH)D3 SERPL-MCNC: 23 NG/ML
ALBUMIN MFR SERPL ELPH: 56.6 %
ALBUMIN SERPL ELPH-MCNC: 4.3 G/DL
ALBUMIN SERPL-MCNC: 4 G/DL
ALBUMIN/GLOB SERPL: 1.3 RATIO
ALDOSTERONE SERUM: 6.7 NG/DL
ALP BLD-CCNC: 141 U/L
ALPHA1 GLOB MFR SERPL ELPH: 4.5 %
ALPHA1 GLOB SERPL ELPH-MCNC: 0.3 G/DL
ALPHA2 GLOB MFR SERPL ELPH: 10.4 %
ALPHA2 GLOB SERPL ELPH-MCNC: 0.7 G/DL
ALT SERPL-CCNC: 18 U/L
ANA PAT FLD IF-IMP: ABNORMAL
ANA SER IF-ACNC: ABNORMAL
ANION GAP SERPL CALC-SCNC: 14 MMOL/L
APPEARANCE: CLEAR
APTT BLD: 28.4 SEC
AST SERPL-CCNC: 14 U/L
B-GLOBULIN MFR SERPL ELPH: 13 %
B-GLOBULIN SERPL ELPH-MCNC: 0.9 G/DL
BACTERIA UR CULT: NORMAL
BACTERIA: NEGATIVE /HPF
BILIRUB SERPL-MCNC: 0.5 MG/DL
BILIRUBIN URINE: NEGATIVE
BLOOD URINE: NEGATIVE
BUN SERPL-MCNC: 42 MG/DL
C3 SERPL-MCNC: 109 MG/DL
C4 SERPL-MCNC: 22 MG/DL
CALCIUM SERPL-MCNC: 10.2 MG/DL
CALCIUM SERPL-MCNC: 10.2 MG/DL
CARDIOLIPIN AB SER IA-ACNC: POSITIVE
CAST: 3 /LPF
CHLORIDE SERPL-SCNC: 104 MMOL/L
CO2 SERPL-SCNC: 22 MMOL/L
COLOR: YELLOW
CREAT SERPL-MCNC: 2 MG/DL
CREAT SPEC-SCNC: 103 MG/DL
CREAT SPEC-SCNC: 103 MG/DL
CREAT/PROT UR: 0.1 RATIO
CRP SERPL-MCNC: <3 MG/L
CRYOGLOB SERPL-MCNC: NEGATIVE
CYSTATIN C SERPL-MCNC: 2.53 MG/L
DEPRECATED D DIMER PPP IA-ACNC: 515 NG/ML DDU
DEPRECATED KAPPA LC FREE/LAMBDA SER: 1.96 RATIO
DSDNA AB SER-ACNC: <1 IU/ML
EGFR: 32 ML/MIN/1.73M2
EPITHELIAL CELLS: 0 /HPF
FERRITIN SERPL-MCNC: 36 NG/ML
GAMMA GLOB FLD ELPH-MCNC: 1.1 G/DL
GAMMA GLOB MFR SERPL ELPH: 15.5 %
GFR/BSA.PRED SERPLBLD CYS-BASED-ARV: 21 ML/MIN/1.73M2
GLUCOSE QUALITATIVE U: NEGATIVE MG/DL
GLUCOSE SERPL-MCNC: 113 MG/DL
HBV SURFACE AB SER QL: NONREACTIVE
HBV SURFACE AG SER QL: NONREACTIVE
HCV AB SER QL: NONREACTIVE
HCV S/CO RATIO: 0.11 S/CO
IGA SER QL IEP: 396 MG/DL
IGG SER QL IEP: 1043 MG/DL
IGM SER QL IEP: 59 MG/DL
INR PPP: 1.08 RATIO
INTERPRETATION SERPL IEP-IMP: NORMAL
IRON SATN MFR SERPL: 12 %
IRON SERPL-MCNC: 38 UG/DL
KAPPA LC CSF-MCNC: 3.62 MG/DL
KAPPA LC SERPL-MCNC: 7.11 MG/DL
KETONES URINE: NEGATIVE MG/DL
LEUKOCYTE ESTERASE URINE: NEGATIVE
M PROTEIN SPEC IFE-MCNC: NORMAL
MAGNESIUM SERPL-MCNC: 2.2 MG/DL
MICROALBUMIN 24H UR DL<=1MG/L-MCNC: 1.8 MG/DL
MICROALBUMIN/CREAT 24H UR-RTO: 17 MG/G
MICROSCOPIC-UA: NORMAL
MYELOPEROXIDASE AB SER QL IA: <5 UNITS
MYELOPEROXIDASE CELLS FLD QL: NEGATIVE
NITRITE URINE: NEGATIVE
NT-PROBNP SERPL-MCNC: 1105 PG/ML
OSMOLALITY SERPL: 302 MOSMOL/KG
OSMOLALITY UR: 438 MOSM/KG
PARATHYROID HORMONE INTACT: 146 PG/ML
PH URINE: 6
PHOSPHATE SERPL-MCNC: 3.1 MG/DL
PHOSPHOLIPASE A2 RECEPTOR ELISA: <1.8 RU/ML
POTASSIUM SERPL-SCNC: 4 MMOL/L
PROT SERPL-MCNC: 7.1 G/DL
PROT UR-MCNC: 10 MG/DL
PROTEIN URINE: NEGATIVE MG/DL
PROTEINASE3 AB SER IA-ACNC: <5 UNITS
PROTEINASE3 AB SER-ACNC: NEGATIVE
PSA FREE FLD-MCNC: 18 %
PSA FREE SERPL-MCNC: 0.4 NG/ML
PSA SERPL-MCNC: 2.25 NG/ML
PT BLD: 12.3 SEC
RED BLOOD CELLS URINE: 0 /HPF
SODIUM ?TM SUB UR QN: 50 MMOL/L
SODIUM SERPL-SCNC: 140 MMOL/L
SPECIFIC GRAVITY URINE: 1.02
TIBC SERPL-MCNC: 332 UG/DL
TSH SERPL-ACNC: 1.64 UIU/ML
UIBC SERPL-MCNC: 294 UG/DL
UROBILINOGEN URINE: 0.2 MG/DL
WHITE BLOOD CELLS URINE: 0 /HPF

## 2024-04-10 NOTE — CONSULT LETTER
[FreeTextEntry2] : Rosas Elkins MD [facility] [address] [place] [address] [FreeTextEntry1] : I saw Mr Philip Aguilar for evaluation for swelling. He was seen a year ago with newly diagnosed DVTs in both legs. He was started on Eliquis. He was advised to see a hematologist for hypercoagulable workup as there was no clear etiology of the thrombotic events. Late November, he presented with leg edema and anticoagulation had been stopped after 6 months. There were no acute thrombotic findings but he was recommended to see you for given fluid retention.  Today, he reports edema in the hands and legs which recently worsened. He claims that this is interfering with his activity level. Denies any history of ulcers or skin breakdown, rest pain, shortness of breath, chest pain or leg coolness. He is on Plavix, a statin and furosemide daily.  On exam, palpable pedal pulses bilaterally. Severe, pitting edema on calves, ankles and feet. No palpable cords. Feet warm to touch with normal capillary refill. Skin intact.  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 No acute thrombus.  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  I recommend work-up for occult malignancy and hypercoagulable workup.  I do not feel that the edema is the chronic changes in the popliteal veins is responsible for the leg edema.  I recommended that he obtain surgical compression stockings but asked them to consult with you regarding the advisability of taking a diuretic.    My complete EMR office note is below for your records. [FreeTextEntry3] : Sincerely,   Kolton Arana M.D.  , Surgical Services [facility] Surgeon-in-Chief, [facility] Professor of Surgery, [employer] at [facility]

## 2024-04-10 NOTE — PROCEDURE
[FreeTextEntry1] : Bilt LE Venous US ordered to eval DVT, reveals: Negative acute DVT. 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

## 2024-04-10 NOTE — ADDENDUM
[FreeTextEntry1] : I, Dr. Kolton Arana, personally performed the evaluation and management (E/M) services for this established patient who presents today with (a) new problem(s)/exacerbation of (an) existing condition(s).  That E/M includes conducting the examination, assessing all new/exacerbated conditions, and establishing a new plan of care.  Today, my ACP, Denice Banuelos NP, was here to observe my evaluation and management services for this new problem/exacerbated condition to be followed going forward.

## 2024-04-10 NOTE — PHYSICAL EXAM
[Respiratory Effort] : normal respiratory effort [Ankle Swelling (On Exam)] : present [Ankle Swelling Bilaterally] : bilaterally  [Ankle Swelling On The Left] : moderate [No Rash or Lesion] : No rash or lesion [Alert] : alert [Oriented to Person] : oriented to person [Oriented to Place] : oriented to place [Oriented to Time] : oriented to time [Calm] : calm [2+] : left 2+ [Varicose Veins Of Lower Extremities] : not present [] : not present [de-identified] : WNWD [FreeTextEntry1] : Good capillary refill. BLE with erythematous skin over the anterior aspect of the calves.  No palpable cords. Feet warm to touch. Skin intact. +3-4 pitting edema on both calves, ankles and feet, worse on the LLE. [de-identified] : FROM
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
## 2024-04-10 NOTE — ASSESSMENT
Quality 431: Preventive Care And Screening: Unhealthy Alcohol Use - Screening: Patient screened for unhealthy alcohol use using a single question and scores less than 2 times per year Quality 265: Biopsy Follow-Up: Biopsy results reviewed, communicated, tracked, and documented [Medication Management] : medication management Quality 110: Preventive Care And Screening: Influenza Immunization: Influenza Immunization Administered during Influenza season [Arterial/Venous Disease] : arterial/venous disease Quality 226: Preventive Care And Screening: Tobacco Use: Screening And Cessation Intervention: Patient screened for tobacco use and is an ex/non-smoker [FreeTextEntry1] : 85 y/o M w/ with hx of incidental findings of BLEs DVT (chronic bilaterally, ?etiology, pending heme w/u) and edema of both UEs and LEs.  On exam, good capillary refill. Palpable radial and pedal pulses bilaterally. BLE with erythematous skin over the anterior aspect of the calves.  No palpable cords. Feet warm to touch. Skin intact. +3-4 pitting edema on both calves, ankles and feet, worse on the LLE. Isaac LEs venous duplex revealed no acute DVT. 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  Plan: 1. I reviewed and discussed findings. No need for AC therapy. Pt to continue home Plavix. 2. Leg elevation above the chest level. 3. Recommend compression stockings daily 20-30 mmHg and catalan application device (rx sent for both). 4. Keep skin moisturized. 5. Walk as much as tolerated, and calf pump exercises. 6. Hematological eval to r/o any hypercoagulable state, ?etiology of DVTs. 7. Reach out to Dr Elkins for possible repeat echo (not recent one on file), increase in diuretic. 8. Return prn Quality 128: Preventive Care And Screening: Body Mass Index (Bmi) Screening And Follow-Up Plan: BMI not documented, reason not otherwise specified. Quality 47: Advance Care Plan: Advance care planning not documented, reason not otherwise specified. Quality 111:Pneumonia Vaccination Status For Older Adults: Pneumococcal Vaccination Previously Received Quality 130: Documentation Of Current Medications In The Medical Record: Current Medications Documented Detail Level: Detailed

## 2024-04-15 PROBLEM — R39.9 LOWER URINARY TRACT SYMPTOMS (LUTS): Status: ACTIVE | Noted: 2023-01-09

## 2024-04-15 PROBLEM — R31.0 GROSS HEMATURIA: Status: ACTIVE | Noted: 2023-09-01

## 2024-04-15 PROBLEM — N20.1 RIGHT URETERAL STONE: Status: ACTIVE | Noted: 2023-09-19

## 2024-04-15 NOTE — HISTORY OF PRESENT ILLNESS
[FreeTextEntry1] : 9/1/23: 83 year old man with history of HTN, CAD s/p stent on plavix, DVT on eliquis presents with gross hematuria after recent spinal surgery.  Patient reports having a catheter placed during spinal surgery 3 weeks ago. He noted hematuria has been present since the catheter was placed. He was discharged with rothman catheter. Had catheter removed at [facility] and has been urinating without catheter since then. The urine color has been dark maroon colored. No clots. No difficulty passing the urine. No straining. No fevers, chills, dysuria. He had multiple urine cultures performed, but unsure of the results. Not currently on antibiotics.  He denies bothersome LUTS at baseline. He has been constipated since surgery. Taking pain medications for ongoing pain.  CT A/P with IV contrast 3/2023: bilateral renal cysts, non-obstructing renal stones up to 4 mm, 56 cc prostate with intravesical median lobe  9/19/23: After last visit, hematuria resolved after stopping eliquis. He was advised by Dr. Arana's office that he could remain off of eliquis indefinitely. He was prescribed cipro for pseudomonas UTI. Was feeling better until last week, when he developed severe fatigue. Presented to [facility], found to have 2 mm right ureteral stone. He was febrile and hypotensive at that time. Decision made to start antibiotics and not place stent. He says he was in the hospital for two days. A follow up CT showed that the stone had migrated to the right UVJ. Urologist at [facility] recommended outpatient follow up as it was felt that the stone would pass so stent was not needed. Patient has been afebrile and feeling better overall since starting cipro.   IPSS 12  9/26/23: Having right flank pain, not as severe as prior, but persistent. No fevers, chills, dysuria, hematuria. He has chronic back pain, so hard to distinguish if from kidney stone or chronic pain.  Urine culture from last week was negative  CT A/P 10/2/23: Resolution of ureteral stone and hydronephrosis. Bilateral non-obstructing stones measuring up to 5 mm.   4/16/24: Follow up for renal ultrasound. No further episodes of flank pain or hematuria. Bilateral DVTs, with leg swelling.

## 2024-04-15 NOTE — ASSESSMENT
[FreeTextEntry1] : 83 year old man with history of HTN, CAD s/p stent on plavix, DVT previously on eliquis presented recently with gross hematuria in setting of UTI after recent spinal surgery was complicated by urinary retention. After that episode, he was able to void without catheter, hematuria resolved after stopping eliquis (which he no longer needs to take), and he was treated for pseudomonas UTI with cipro.  He then presented to [facility] with right ureteral stone and sepsis 9/13/23. He stabilized with antibiotics. Patient left AMA, though he was told that he does not need stent placement because the stone was only 2 mm and had migrated to the right UVJ on CT from 9/17. He continues to have right flank pain, but no fevers, chills, dysuria, hematuria. Pain not as severe as prior. CT 10/2/23 showed passage of the right ureteral stone with resolution of hydronephrosis. Pain likely neurologic in origin. He has additional non-obstructing stones measuring up to 5 mm. We discussed observation vs ESWL vs ureteroscopy and laser lithotripsy. Will plan to monitor for now.   - F/U in 6 months for renal ultrasound

## 2024-04-16 ENCOUNTER — APPOINTMENT (OUTPATIENT)
Dept: UROLOGY | Facility: CLINIC | Age: 84
End: 2024-04-16

## 2024-04-16 DIAGNOSIS — R39.9 UNSPECIFIED SYMPTOMS AND SIGNS INVOLVING THE GENITOURINARY SYSTEM: ICD-10-CM

## 2024-04-16 DIAGNOSIS — R31.0 GROSS HEMATURIA: ICD-10-CM

## 2024-04-16 DIAGNOSIS — N20.1 CALCULUS OF URETER: ICD-10-CM

## 2024-04-18 LAB
CORTICOSTEROID BIND GLOBULIN: 1.6 MG/DL
CORTIS SERPL-MCNC: 5.3 UG/DL
CORTISOL, FREE: 0.3 UG/DL
PFCX: 5.7 %
RENIN ACTIVITY, PLASMA: 1.76 NG/ML/HR

## 2024-04-24 ENCOUNTER — APPOINTMENT (OUTPATIENT)
Dept: NEPHROLOGY | Facility: CLINIC | Age: 84
End: 2024-04-24
Payer: MEDICARE

## 2024-04-24 VITALS
DIASTOLIC BLOOD PRESSURE: 60 MMHG | RESPIRATION RATE: 16 BRPM | SYSTOLIC BLOOD PRESSURE: 110 MMHG | WEIGHT: 163 LBS | HEART RATE: 62 BPM | BODY MASS INDEX: 24.78 KG/M2

## 2024-04-24 DIAGNOSIS — I10 ESSENTIAL (PRIMARY) HYPERTENSION: ICD-10-CM

## 2024-04-24 DIAGNOSIS — R60.0 LOCALIZED EDEMA: ICD-10-CM

## 2024-04-24 DIAGNOSIS — I82.409 ACUTE EMBOLISM AND THROMBOSIS OF UNSPECIFIED DEEP VEINS OF UNSPECIFIED LOWER EXTREMITY: ICD-10-CM

## 2024-04-24 DIAGNOSIS — N18.9 ACUTE KIDNEY FAILURE, UNSPECIFIED: ICD-10-CM

## 2024-04-24 DIAGNOSIS — N17.9 ACUTE KIDNEY FAILURE, UNSPECIFIED: ICD-10-CM

## 2024-04-24 DIAGNOSIS — R63.4 ABNORMAL WEIGHT LOSS: ICD-10-CM

## 2024-04-24 DIAGNOSIS — J84.9 INTERSTITIAL PULMONARY DISEASE, UNSPECIFIED: ICD-10-CM

## 2024-04-24 PROCEDURE — G2211 COMPLEX E/M VISIT ADD ON: CPT

## 2024-04-24 PROCEDURE — 93308 TTE F-UP OR LMTD: CPT | Mod: 59

## 2024-04-24 PROCEDURE — 99215 OFFICE O/P EST HI 40 MIN: CPT

## 2024-04-24 RX ORDER — PRAMOXINE HYDROCHLORIDE 10 MG/ML
1 LOTION TOPICAL 3 TIMES DAILY
Qty: 1 | Refills: 0 | Status: ACTIVE | COMMUNITY
Start: 2024-04-24 | End: 1900-01-01

## 2024-04-24 NOTE — HISTORY OF PRESENT ILLNESS
[FreeTextEntry1] : Patient is an 83 yo M with HTN, BPH, hx of nephrolithiasis, who presents for evaluation of CKD, weight loss, and LE edema  CKD stable on last labs, weight loss stable, LE edema stable assess by vascular will wrap and elevate legs. CT scan with ILD, will refer to pulm Heme onc referral for +cardiolipin  POCUS without worsening fluid overload, will continue lasix 80 BID for now. Labs reviewed with patient.

## 2024-04-24 NOTE — ASSESSMENT
[FreeTextEntry1] : Patient is an 83 yo M with HTN, BPH, hx of nephrolithiasis, who presents for evaluation of CKD, weight loss, and LE edema  Weight loss - last colonoscopy a few years ago, negative, aged out, no symptoms. 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

## 2024-04-24 NOTE — ASSESSMENT
[FreeTextEntry1] : Patient is an 85 yo M with HTN, BPH, hx of nephrolithiasis, who presents for evaluation of CKD, weight loss, and LE edema  Weight loss - last colonoscopy a few years ago, negative, aged out, no symptoms. 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

## 2024-04-29 ENCOUNTER — APPOINTMENT (OUTPATIENT)
Dept: PULMONOLOGY | Facility: CLINIC | Age: 84
End: 2024-04-29

## 2024-04-29 NOTE — ASSESSMENT
[FreeTextEntry1] : 1.CT A/P 10/2023: reticulation and traction bronchiectasis in the lung bases suggesting chronic ILD  2. Labs 4/2024 Anticardiolipin Ab elevated ANCA indeterminate WAYLON positive

## 2024-04-29 NOTE — HISTORY OF PRESENT ILLNESS
[Never] : never [TextBox_4] : The patient is an 84 year old M with PMHx of HTN, BPH, CKD, DVT (s/p Eliquis x 6 months), referred by Dr. Araujo for SOB and CT scan showing ILD. Has had unintentional weight loss of 80 pounds this year. Has chronic LE edema on lasix 80 mg BID.

## 2024-05-15 ENCOUNTER — APPOINTMENT (OUTPATIENT)
Dept: NEPHROLOGY | Facility: CLINIC | Age: 84
End: 2024-05-15

## 2024-05-17 ENCOUNTER — EMERGENCY (EMERGENCY)
Facility: HOSPITAL | Age: 84
LOS: 1 days | Discharge: LEFT BEFORE DISCHARGE | End: 2024-05-17
Attending: EMERGENCY MEDICINE | Admitting: EMERGENCY MEDICINE
Payer: MEDICARE

## 2024-05-17 VITALS
DIASTOLIC BLOOD PRESSURE: 57 MMHG | SYSTOLIC BLOOD PRESSURE: 121 MMHG | HEART RATE: 89 BPM | OXYGEN SATURATION: 97 % | TEMPERATURE: 98 F | RESPIRATION RATE: 20 BRPM

## 2024-05-17 DIAGNOSIS — Z98.89 OTHER SPECIFIED POSTPROCEDURAL STATES: Chronic | ICD-10-CM

## 2024-05-17 DIAGNOSIS — Z95.5 PRESENCE OF CORONARY ANGIOPLASTY IMPLANT AND GRAFT: Chronic | ICD-10-CM

## 2024-05-17 PROCEDURE — L9991: CPT

## 2024-05-17 NOTE — ED ADULT TRIAGE NOTE - CHIEF COMPLAINT QUOTE
85 y/o male sent by PMD for evaluation of low hemoglobin of 6.7 and fatigue, Pt also endorses chills.

## 2024-05-17 NOTE — ED ADULT TRIAGE NOTE - TEMP AT ED ARRIVAL (C)
[address] 
[phone] Patient: Aram Madison MRN: [id number] :2008 You are allergic to the following Allergen Reactions Latex Rash Adhesive Other (comments) \"BURNED SKIN\" Augmentin (Amoxicillin-Pot Clavulanate) Unknown (comments) Entire body \"spots\" Betadine (Povidone-Iodine) Hives Recent Documentation Height Weight BMI OB Status Smoking Status (!) 1.498 m (77 %, Z= 0.73)* 53.7 kg (94 %, Z= 1.55)* 23.93 kg/m2 (95 %, Z= 1.61)* Premenarcheal Passive Smoke Exposure - Never Smoker *Growth percentiles are based on CDC 2-20 Years data. 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 
[phone] Current Discharge Medication List  
  
START taking these medications Dose & Instructions Dispensing Information Comments Morning Noon Evening Bedtime  
 diazePAM 5 mg tablet Commonly known as:  VALIUM Your last dose was: Your next dose is:    
   
   
 Dose:  5 mg Take 1 Tab by mouth every six (6) hours as needed. Max Daily Amount: 20 mg. Indications: MUSCLE SPASM Quantity:  20 Tab Refills:  0  
     
   
   
   
  
 oxyCODONE-acetaminophen 5-325 mg per tablet Commonly known as:  PERCOCET Your last dose was: Your next dose is:    
   
   
 Dose:  1 Tab Take 1 Tab by mouth every four (4) hours as needed. Max Daily Amount: 6 Tabs. Quantity:  50 Tab Refills:  0 CONTINUE these medications which have NOT CHANGED Dose & Instructions Dispensing Information Comments Morning Noon Evening Bedtime BENADRYL ALLERGY 25 mg tablet Generic drug:  diphenhydrAMINE Your last dose was: Your next dose is:    
   
   
 Dose:  25 mg Take 25 mg by mouth every six (6) hours as needed for Sleep. Refills:  0  
     
   
   
   
  
 ibuprofen 200 mg tablet Commonly known as:  MOTRIN Your last dose was: Your next dose is:    
   
   
 Dose:  200 mg Take 200 mg by mouth every six (6) hours as needed for Pain. Refills:  0 STOP taking these medications TYLENOL EXTRA STRENGTH 500 mg tablet Generic drug:  acetaminophen Where to Get Your Medications Information on where to get these meds will be given to you by the nurse or doctor. ! Ask your nurse or doctor about these medications  
  diazePAM 5 mg tablet  
 oxyCODONE-acetaminophen 5-325 mg per tablet Discharge Instructions Upper Extremity Discharge Instructions Apply ice for 48 hours. Elevate above heart for 48 - 72 hours. Neurovascular checks every 2 hours. Remove dressing (unless there is a cast) after 48 hours Do not bear weight with left arm. Keep sling in place 36.9 Discharge Orders None [employer] Announcement We are excited to announce that we are making your provider's discharge notes available to you in [employer]. You will see these notes when they are completed and signed by the physician that discharged you from your recent hospital stay. If you have any questions or concerns about any information you see in [employer], please call the Health Information Department where you were seen or reach out to your Primary Care Provider for more information about your plan of care. Introducing [facility]! Dear Parent or Guardian, Thank you for requesting a [employer] account for your child. With [employer], you can view your childs hospital or ER discharge instructions, current allergies, immunizations and much more. In order to access your childs information, we require a signed consent on file. Please see the [facility] department or call [phone] for instructions on completing a [employer] Proxy request.   
Additional Information If you have questions, please visit the Frequently Asked Questions section of the [employer] website at [email or web address] Remember, [employer] is NOT to be used for urgent needs. For medical emergencies, dial 911. Now available from your iPhone and Android! General Information Please provide this summary of care documentation to your next provider. Patient Signature:  ____________________________________________________________ Date:  ____________________________________________________________  
  
Cris Logan Provider Signature:  ____________________________________________________________ Date:  ____________________________________________________________

## 2024-05-17 NOTE — ED PROVIDER NOTE - NSCAREINITIATED _GEN_ER
Annie Adrian(Attending) impaired balance/abnormal muscle tone/pain/impaired postural control/decreased strength

## 2024-05-17 NOTE — ED ADULT NURSE NOTE - NSFALLHARMRISKINTERV_ED_ALL_ED

## 2024-05-17 NOTE — ED PROVIDER NOTE - CLINICAL SUMMARY MEDICAL DECISION MAKING FREE TEXT BOX
I pre-ordered labs on this pt based on his CC, as part of my role as the PIC. RN attempted labs/line and refused. I picked up this patient after that, but pt had already left the department. SAVANNAHBS

## 2024-05-17 NOTE — ED ADULT NURSE NOTE - OBJECTIVE STATEMENT
Patient presents to ER AOx3 speaking in full sentences sent by MD for low hgb. Patient also reports inc fatigue and 'passing out.' denies hx of blood transfusion. Limited participation in interview, repeatedly saying he wants to leave. Writer educated patient on low hgb and signs and symptoms. Patient refusing bloodwork at this time.

## 2024-05-21 DIAGNOSIS — D64.9 ANEMIA, UNSPECIFIED: ICD-10-CM

## 2024-05-21 DIAGNOSIS — Z53.21 PROCEDURE AND TREATMENT NOT CARRIED OUT DUE TO PATIENT LEAVING PRIOR TO BEING SEEN BY HEALTH CARE PROVIDER: ICD-10-CM

## 2024-05-28 ENCOUNTER — EMERGENCY (EMERGENCY)
Facility: HOSPITAL | Age: 84
LOS: 1 days | Discharge: LEFT BEFORE DISCHARGE | End: 2024-05-28
Attending: EMERGENCY MEDICINE | Admitting: EMERGENCY MEDICINE
Payer: MEDICARE

## 2024-05-28 VITALS
TEMPERATURE: 98 F | RESPIRATION RATE: 16 BRPM | DIASTOLIC BLOOD PRESSURE: 59 MMHG | SYSTOLIC BLOOD PRESSURE: 109 MMHG | HEART RATE: 74 BPM | OXYGEN SATURATION: 95 %

## 2024-05-28 VITALS
WEIGHT: 160.06 LBS | SYSTOLIC BLOOD PRESSURE: 81 MMHG | DIASTOLIC BLOOD PRESSURE: 43 MMHG | OXYGEN SATURATION: 99 % | HEART RATE: 83 BPM | TEMPERATURE: 98 F | RESPIRATION RATE: 18 BRPM

## 2024-05-28 DIAGNOSIS — I12.9 HYPERTENSIVE CHRONIC KIDNEY DISEASE WITH STAGE 1 THROUGH STAGE 4 CHRONIC KIDNEY DISEASE, OR UNSPECIFIED CHRONIC KIDNEY DISEASE: ICD-10-CM

## 2024-05-28 DIAGNOSIS — Z91.040 LATEX ALLERGY STATUS: ICD-10-CM

## 2024-05-28 DIAGNOSIS — M10.9 GOUT, UNSPECIFIED: ICD-10-CM

## 2024-05-28 DIAGNOSIS — R53.81 OTHER MALAISE: ICD-10-CM

## 2024-05-28 DIAGNOSIS — Z98.89 OTHER SPECIFIED POSTPROCEDURAL STATES: Chronic | ICD-10-CM

## 2024-05-28 DIAGNOSIS — A41.9 SEPSIS, UNSPECIFIED ORGANISM: ICD-10-CM

## 2024-05-28 DIAGNOSIS — I25.10 ATHEROSCLEROTIC HEART DISEASE OF NATIVE CORONARY ARTERY WITHOUT ANGINA PECTORIS: ICD-10-CM

## 2024-05-28 DIAGNOSIS — L03.112 CELLULITIS OF LEFT AXILLA: ICD-10-CM

## 2024-05-28 DIAGNOSIS — Z95.5 PRESENCE OF CORONARY ANGIOPLASTY IMPLANT AND GRAFT: ICD-10-CM

## 2024-05-28 DIAGNOSIS — Z53.29 PROCEDURE AND TREATMENT NOT CARRIED OUT BECAUSE OF PATIENT'S DECISION FOR OTHER REASONS: ICD-10-CM

## 2024-05-28 DIAGNOSIS — E11.9 TYPE 2 DIABETES MELLITUS WITHOUT COMPLICATIONS: ICD-10-CM

## 2024-05-28 DIAGNOSIS — Z86.2 PERSONAL HISTORY OF DISEASES OF THE BLOOD AND BLOOD-FORMING ORGANS AND CERTAIN DISORDERS INVOLVING THE IMMUNE MECHANISM: ICD-10-CM

## 2024-05-28 DIAGNOSIS — N17.9 ACUTE KIDNEY FAILURE, UNSPECIFIED: ICD-10-CM

## 2024-05-28 DIAGNOSIS — N18.9 CHRONIC KIDNEY DISEASE, UNSPECIFIED: ICD-10-CM

## 2024-05-28 DIAGNOSIS — Z86.718 PERSONAL HISTORY OF OTHER VENOUS THROMBOSIS AND EMBOLISM: ICD-10-CM

## 2024-05-28 DIAGNOSIS — E78.00 PURE HYPERCHOLESTEROLEMIA, UNSPECIFIED: ICD-10-CM

## 2024-05-28 DIAGNOSIS — Z95.5 PRESENCE OF CORONARY ANGIOPLASTY IMPLANT AND GRAFT: Chronic | ICD-10-CM

## 2024-05-28 LAB
ALBUMIN SERPL ELPH-MCNC: 3.7 G/DL — SIGNIFICANT CHANGE UP (ref 3.3–5)
ALP SERPL-CCNC: 115 U/L — SIGNIFICANT CHANGE UP (ref 40–120)
ALT FLD-CCNC: 9 U/L — LOW (ref 10–45)
ANION GAP SERPL CALC-SCNC: 10 MMOL/L — SIGNIFICANT CHANGE UP (ref 5–17)
APPEARANCE UR: CLEAR — SIGNIFICANT CHANGE UP
APTT BLD: 29.7 SEC — SIGNIFICANT CHANGE UP (ref 24.5–35.6)
AST SERPL-CCNC: 13 U/L — SIGNIFICANT CHANGE UP (ref 10–40)
BACTERIA # UR AUTO: NEGATIVE /HPF — SIGNIFICANT CHANGE UP
BASOPHILS # BLD AUTO: 0.03 K/UL — SIGNIFICANT CHANGE UP (ref 0–0.2)
BASOPHILS NFR BLD AUTO: 0.5 % — SIGNIFICANT CHANGE UP (ref 0–2)
BILIRUB SERPL-MCNC: 0.3 MG/DL — SIGNIFICANT CHANGE UP (ref 0.2–1.2)
BILIRUB UR-MCNC: NEGATIVE — SIGNIFICANT CHANGE UP
BUN SERPL-MCNC: 95 MG/DL — HIGH (ref 7–23)
CALCIUM SERPL-MCNC: 10.4 MG/DL — SIGNIFICANT CHANGE UP (ref 8.4–10.5)
CAST: 10 /LPF — HIGH (ref 0–4)
CHLORIDE SERPL-SCNC: 107 MMOL/L — SIGNIFICANT CHANGE UP (ref 96–108)
CO2 SERPL-SCNC: 24 MMOL/L — SIGNIFICANT CHANGE UP (ref 22–31)
COLOR SPEC: YELLOW — SIGNIFICANT CHANGE UP
CREAT SERPL-MCNC: 4.04 MG/DL — HIGH (ref 0.5–1.3)
DIFF PNL FLD: NEGATIVE — SIGNIFICANT CHANGE UP
EGFR: 14 ML/MIN/1.73M2 — LOW
EOSINOPHIL # BLD AUTO: 0.39 K/UL — SIGNIFICANT CHANGE UP (ref 0–0.5)
EOSINOPHIL NFR BLD AUTO: 6.6 % — HIGH (ref 0–6)
GLUCOSE SERPL-MCNC: 150 MG/DL — HIGH (ref 70–99)
GLUCOSE UR QL: NEGATIVE MG/DL — SIGNIFICANT CHANGE UP
HCT VFR BLD CALC: 31 % — LOW (ref 39–50)
HGB BLD-MCNC: 10 G/DL — LOW (ref 13–17)
HYALINE CASTS # UR AUTO: PRESENT
IMM GRANULOCYTES NFR BLD AUTO: 0.3 % — SIGNIFICANT CHANGE UP (ref 0–0.9)
INR BLD: 1.01 — SIGNIFICANT CHANGE UP (ref 0.85–1.18)
KETONES UR-MCNC: NEGATIVE MG/DL — SIGNIFICANT CHANGE UP
LACTATE SERPL-SCNC: 1 MMOL/L — SIGNIFICANT CHANGE UP (ref 0.5–2)
LEUKOCYTE ESTERASE UR-ACNC: ABNORMAL
LYMPHOCYTES # BLD AUTO: 1.2 K/UL — SIGNIFICANT CHANGE UP (ref 1–3.3)
LYMPHOCYTES # BLD AUTO: 20.3 % — SIGNIFICANT CHANGE UP (ref 13–44)
MCHC RBC-ENTMCNC: 27.5 PG — SIGNIFICANT CHANGE UP (ref 27–34)
MCHC RBC-ENTMCNC: 32.3 GM/DL — SIGNIFICANT CHANGE UP (ref 32–36)
MCV RBC AUTO: 85.2 FL — SIGNIFICANT CHANGE UP (ref 80–100)
MONOCYTES # BLD AUTO: 0.54 K/UL — SIGNIFICANT CHANGE UP (ref 0–0.9)
MONOCYTES NFR BLD AUTO: 9.1 % — SIGNIFICANT CHANGE UP (ref 2–14)
NEUTROPHILS # BLD AUTO: 3.73 K/UL — SIGNIFICANT CHANGE UP (ref 1.8–7.4)
NEUTROPHILS NFR BLD AUTO: 63.2 % — SIGNIFICANT CHANGE UP (ref 43–77)
NITRITE UR-MCNC: NEGATIVE — SIGNIFICANT CHANGE UP
NRBC # BLD: 0 /100 WBCS — SIGNIFICANT CHANGE UP (ref 0–0)
PH UR: 6 — SIGNIFICANT CHANGE UP (ref 5–8)
PLATELET # BLD AUTO: 211 K/UL — SIGNIFICANT CHANGE UP (ref 150–400)
POTASSIUM SERPL-MCNC: 5 MMOL/L — SIGNIFICANT CHANGE UP (ref 3.5–5.3)
POTASSIUM SERPL-SCNC: 5 MMOL/L — SIGNIFICANT CHANGE UP (ref 3.5–5.3)
PROT SERPL-MCNC: 6.4 G/DL — SIGNIFICANT CHANGE UP (ref 6–8.3)
PROT UR-MCNC: NEGATIVE MG/DL — SIGNIFICANT CHANGE UP
PROTHROM AB SERPL-ACNC: 11.5 SEC — SIGNIFICANT CHANGE UP (ref 9.5–13)
RBC # BLD: 3.64 M/UL — LOW (ref 4.2–5.8)
RBC # FLD: 17.9 % — HIGH (ref 10.3–14.5)
RBC CASTS # UR COMP ASSIST: 1 /HPF — SIGNIFICANT CHANGE UP (ref 0–4)
SODIUM SERPL-SCNC: 141 MMOL/L — SIGNIFICANT CHANGE UP (ref 135–145)
SP GR SPEC: 1.01 — SIGNIFICANT CHANGE UP (ref 1–1.03)
SQUAMOUS # UR AUTO: 1 /HPF — SIGNIFICANT CHANGE UP (ref 0–5)
TROPONIN T, HIGH SENSITIVITY RESULT: 108 NG/L — CRITICAL HIGH (ref 0–51)
TROPONIN T, HIGH SENSITIVITY RESULT: 113 NG/L — CRITICAL HIGH (ref 0–51)
UROBILINOGEN FLD QL: 0.2 MG/DL — SIGNIFICANT CHANGE UP (ref 0.2–1)
WBC # BLD: 5.91 K/UL — SIGNIFICANT CHANGE UP (ref 3.8–10.5)
WBC # FLD AUTO: 5.91 K/UL — SIGNIFICANT CHANGE UP (ref 3.8–10.5)
WBC UR QL: 2 /HPF — SIGNIFICANT CHANGE UP (ref 0–5)

## 2024-05-28 PROCEDURE — 85025 COMPLETE CBC W/AUTO DIFF WBC: CPT

## 2024-05-28 PROCEDURE — 93005 ELECTROCARDIOGRAM TRACING: CPT

## 2024-05-28 PROCEDURE — 36415 COLL VENOUS BLD VENIPUNCTURE: CPT

## 2024-05-28 PROCEDURE — 87040 BLOOD CULTURE FOR BACTERIA: CPT

## 2024-05-28 PROCEDURE — 93010 ELECTROCARDIOGRAM REPORT: CPT | Mod: 1L

## 2024-05-28 PROCEDURE — 81001 URINALYSIS AUTO W/SCOPE: CPT

## 2024-05-28 PROCEDURE — 80053 COMPREHEN METABOLIC PANEL: CPT

## 2024-05-28 PROCEDURE — 96365 THER/PROPH/DIAG IV INF INIT: CPT

## 2024-05-28 PROCEDURE — 85730 THROMBOPLASTIN TIME PARTIAL: CPT

## 2024-05-28 PROCEDURE — 83605 ASSAY OF LACTIC ACID: CPT

## 2024-05-28 PROCEDURE — 96375 TX/PRO/DX INJ NEW DRUG ADDON: CPT

## 2024-05-28 PROCEDURE — 84484 ASSAY OF TROPONIN QUANT: CPT

## 2024-05-28 PROCEDURE — 96361 HYDRATE IV INFUSION ADD-ON: CPT

## 2024-05-28 PROCEDURE — 99291 CRITICAL CARE FIRST HOUR: CPT | Mod: 25

## 2024-05-28 PROCEDURE — 99291 CRITICAL CARE FIRST HOUR: CPT

## 2024-05-28 PROCEDURE — 85610 PROTHROMBIN TIME: CPT

## 2024-05-28 RX ORDER — VANCOMYCIN HCL 1 G
VIAL (EA) INTRAVENOUS
Refills: 0 | Status: DISCONTINUED | OUTPATIENT
Start: 2024-05-28 | End: 2024-06-01

## 2024-05-28 RX ORDER — VANCOMYCIN HCL 1 G
1000 VIAL (EA) INTRAVENOUS EVERY 12 HOURS
Refills: 0 | Status: DISCONTINUED | OUTPATIENT
Start: 2024-05-29 | End: 2024-06-01

## 2024-05-28 RX ORDER — SODIUM CHLORIDE 9 MG/ML
2300 INJECTION INTRAMUSCULAR; INTRAVENOUS; SUBCUTANEOUS ONCE
Refills: 0 | Status: COMPLETED | OUTPATIENT
Start: 2024-05-28 | End: 2024-05-28

## 2024-05-28 RX ORDER — VANCOMYCIN HCL 1 G
1000 VIAL (EA) INTRAVENOUS ONCE
Refills: 0 | Status: COMPLETED | OUTPATIENT
Start: 2024-05-28 | End: 2024-05-28

## 2024-05-28 RX ORDER — ACETAMINOPHEN 500 MG
1000 TABLET ORAL ONCE
Refills: 0 | Status: COMPLETED | OUTPATIENT
Start: 2024-05-28 | End: 2024-05-28

## 2024-05-28 RX ADMIN — Medication 1000 MILLIGRAM(S): at 16:40

## 2024-05-28 RX ADMIN — SODIUM CHLORIDE 2300 MILLILITER(S): 9 INJECTION INTRAMUSCULAR; INTRAVENOUS; SUBCUTANEOUS at 17:26

## 2024-05-28 RX ADMIN — SODIUM CHLORIDE 2300 MILLILITER(S): 9 INJECTION INTRAMUSCULAR; INTRAVENOUS; SUBCUTANEOUS at 15:50

## 2024-05-28 RX ADMIN — Medication 250 MILLIGRAM(S): at 15:50

## 2024-05-28 RX ADMIN — Medication 400 MILLIGRAM(S): at 17:01

## 2024-05-28 NOTE — ED ADULT TRIAGE NOTE - CHIEF COMPLAINT QUOTE
Patient to ED from infusion clinic for low BP. Was scheduled for iron transfusion r/t anemia but was too hypotensive. Patient c/o weakness, ambulatory, AAOx4, NAD.

## 2024-05-28 NOTE — ED PROVIDER NOTE - NSICDXPASTMEDICALHX_GEN_ALL_CORE_FT
PAST MEDICAL HISTORY:  Back pain     BPH (benign prostatic hypertrophy)     CAD (coronary artery disease)     Chronic kidney disease (CKD)     Deep vein thrombosis (DVT)     Diabetes     Gout     HTN (hypertension)     Hyperlipidemia

## 2024-05-28 NOTE — ED PROVIDER NOTE - PHYSICAL EXAMINATION
VITAL SIGNS: I have reviewed nursing notes and confirm.  CONSTITUTIONAL:  in no acute distress. appears fatigued   SKIN:  warm and dry, no acute rash.   HEAD:  normocephalic, atraumatic.  EYES: PERRL, EOM intact; conjunctiva and sclera clear.  ENT: No nasal discharge; airway clear.   NECK: Supple; non tender.  CARD: S1, S2 normal; no murmurs, gallops, or rubs. Regular rate and rhythm.   RESP:  Clear to auscultation b/l, no wheezes, rales or rhonchi.  ABD: Normal bowel sounds; soft; non-distended; non-tender; no guarding/ rebound.  MSK: Normal ROM. No clubbing, cyanosis or edema. no ttp bilat le  NEURO: Alert, oriented, grossly unremarkable  PSYCH: Cooperative, mood and affect appropriate. VITAL SIGNS: I have reviewed nursing notes and confirm.  CONSTITUTIONAL:  in no acute distress. appears fatigued   SKIN:  warm and dry, no acute rash. bilat le erythema, warmth, ttp, scattered wounds bilat lower legs w/o drainage  HEAD:  normocephalic, atraumatic.  EYES: PERRL, EOM intact; conjunctiva and sclera clear.  ENT: No nasal discharge; airway clear.   NECK: Supple; non tender.  CARD: S1, S2 normal; no murmurs, gallops, or rubs. Regular rate and rhythm.   RESP:  Clear to auscultation b/l, no wheezes, rales or rhonchi.  ABD: Normal bowel sounds; soft; non-distended; non-tender; no guarding/ rebound.  MSK: Normal ROM. No clubbing, cyanosis or edema. no ttp bilat le, dp 1+ bilat  NEURO: Alert, oriented, grossly unremarkable  PSYCH: Cooperative, mood and affect appropriate.

## 2024-05-28 NOTE — ED PROVIDER NOTE - PROGRESS NOTE DETAILS
Pt w hypotension, refusing iv, meds, labs at this time; pt willing to do these things if we contact his pmd, Dr Araujo.  PMD paged, await return call.  Pt also refused peripheral stick for labs.  Discussed w pt and friend at length my concerns for sepsis 2/2 his bilat le cellulitis and also poss hypotension 2/2 his known anemia or new cardiac issue (denies cp, ekg w/o ischemic changes, ekg similar to prior).  Pt expressed understanding but cont to refuse.  We discussed advance directives - pt states he's DNR/DNI; will fill out molst for pt. Pt agreed to labs/meds after discussion w Dr Araujo (pmd) ~ 330.  DNR/I signed; discussed pt and his Advanced Directives w his wife (Vandana, [phone]; she has a health care proxy but agreed that pt currently competent and able to make his own decisions).  Labs notable for elevated trop - ? 2/2 true cardiac issue vs poor clearance 2/2 ckd, brenda w cr now 4 (up from 2's), nl lactate, nl hgb c/w prior.  Rpt trop pending.  Plan admit to med if trop stable or card if trop uptrending. Rpt trop similar to initial.  Pt wants to leave - extensive discussions w pt, wife, family/friends w him in ER about risks of leaving; we discussed that his renal function Has deteriorated and that he could have uremia as one of the causes of his fatigue and lethargy.  This could put him at risk for developing electrolyte abnormalities like hyperkalemia that could be fatal.  It could also lead to worsening mental status.  Patient also has ongoing concerns for sepsis and infection causing his  earlier hypotension and leg symptoms.  This could lead to him developing multiorgan failure and death.  Patient expresses understanding of these and still wants to sign out AMA. Patient desires to leave emergency department against medical advice, prior to completion of my desired evaluation and treatment plan.  Patient's mental status is normal, patient is fully alert and oriented x person, place and time.  Patient demonstrates clear reasoning capabilities and capacity to make this decision.  Patient fully understands the explained risks involved with this decision including worsening of medical condition, missed and or delayed diagnosis, permanent disability and death.  All alternative options given to patient and still desires discharge against medical advice from ED and will follow up as an outpatient.  Patient given the option to return to ED at any time to have further evaluation and treatment.

## 2024-05-28 NOTE — ED ADULT NURSE NOTE - OBJECTIVE STATEMENT
83 yo M presents to ED hypotensive. Pt is alert and oriented, and ambulatory with cane. Denies any falls. 85 yo M presents to ED from infusion clinic being hypotensive. Pt was at infusion clinic for iron transfusion for anemia. C/o weakness. Pt is alert and oriented, and ambulatory with cane. Denies any falls.

## 2024-05-28 NOTE — ED ADULT NURSE NOTE - NSFALLHARMRISKINTERV_ED_ALL_ED
Impression: Regular astigmatism, bilateral: H52.223. Plan: Patient educated. Updated SRx released to patient.  Monitor yearly Assistance OOB with selected safe patient handling equipment if applicable/Assistance with ambulation/Communicate risk of Fall with Harm to all staff, patient, and family/Monitor gait and stability/Provide patient with walking aids/Provide visual cue: red socks, yellow wristband, yellow gown, etc/Reinforce activity limits and safety measures with patient and family/Bed in lowest position, wheels locked, appropriate side rails in place/Call bell, personal items and telephone in reach/Instruct patient to call for assistance before getting out of bed/chair/stretcher/Non-slip footwear applied when patient is off stretcher/[place] to call system/Physically safe environment - no spills, clutter or unnecessary equipment/Purposeful Proactive Rounding/Room/bathroom lighting operational, light cord in reach

## 2024-05-28 NOTE — ED PROVIDER NOTE - PATIENT PORTAL LINK FT
You can access the FollowMyHealth Patient Portal offered by [facility] by registering at the following website: [email or web address]. By joining [facility]’s FollowMyHealth portal, you will also be able to view your health information using other applications (apps) compatible with our system.

## 2024-05-28 NOTE — ED PROVIDER NOTE - CLINICAL SUMMARY MEDICAL DECISION MAKING FREE TEXT BOX
Pt c/o gen malaise, brought for hypotension.  Pt denies new complaint - + chronic back pain unchanged from baseline.  Pt noted to have bilat le cellulitis ? hypostension and lethargy 2/2 sepsis.  No c/o cp, ekg w/o ischemic changes or arrhythmia - low concern for low bp 2/2 cardiac issue.  ? dehydration - reports nl po's.  Pt w known anemia ? anemia related.  Sepsis protocol ordered but pt unwilling to have any interventions.  Will cont to discuss and hope to have pt change his mind and allow eval; reassess. See progress notes for further mdm related documentation.

## 2024-05-28 NOTE — ED PROVIDER NOTE - NSFOLLOWUPINSTRUCTIONS_ED_ALL_ED_FT
Acute kidney injury, possible uremia  Cellulitis of legs, possible sepsis    Please take doxycycline twice a day for 10 days.      Follow up with your pmd and Dr Araujo - call tomorrow to be seen this week.  Your kidney function needs close monitoring.      Return immediately if you change your mind about receiving treatment or develop increased pain, lethargy, worsening leg swelling/redness/pain/drainage, weakness, passing out, chest pain, difficulty breathing, any other concerns.     Cellulitis    Cellulitis is a skin infection caused by bacteria. This condition occurs most often in the arms and lower legs but can occur anywhere over the body. Symptoms include redness, swelling, warm skin, tenderness, and chills/fever. If you were prescribed an antibiotic medicine, take it as told by your health care provider. Do not stop taking the antibiotic even if you start to feel better.    SEEK IMMEDIATE MEDICAL CARE IF YOU HAVE ANY OF THE FOLLOWING SYMPTOMS: worsening fever, red streaks coming from affected area, vomiting or diarrhea, or dizziness/lightheadedness.

## 2024-05-28 NOTE — ED PROVIDER NOTE - OBJECTIVE STATEMENT
84-year-old male history of CAD status post stent, diabetes, hypertension, high cholesterol, CKD (creatinine baseline to use), gout, ILD,  dvt, anemia, chronic back pain status post back surgery complaining of feeling unwell.   patient was getting an iron infusion and was noted to be lethargic and reported feeling unwell and found to have low blood pressure.  Patient was sent to the ER.  On arrival here patient complains of generalized malaise  and chronic back pain consistent with his prior without significant change or associated numbness or weakness in his extremities.  Patient denies chest pain, shortness of breath, palpitations, fever, nausea vomiting or diarrhea, URI symptoms, cough, dysuria, black or bloody stools.  Friend reports patient has been very sleepy and out of it today compared to his normal.

## 2024-05-28 NOTE — ED PROVIDER NOTE - CARE PLAN
Principal Discharge DX:	Uremia  Secondary Diagnosis:	JONH (acute kidney injury)  Secondary Diagnosis:	Cellulitis of leg  Secondary Diagnosis:	Sepsis   1

## 2024-05-28 NOTE — ED PROVIDER NOTE - NSICDXPASTSURGICALHX_GEN_ALL_CORE_FT
(3) adequate PAST SURGICAL HISTORY:  H/O heart artery stent     H/O shoulder surgery     History of back surgery

## 2024-05-29 ENCOUNTER — APPOINTMENT (OUTPATIENT)
Dept: NEPHROLOGY | Facility: CLINIC | Age: 84
End: 2024-05-29

## 2024-05-29 ENCOUNTER — NON-APPOINTMENT (OUTPATIENT)
Age: 84
End: 2024-05-29

## 2024-06-03 LAB
CULTURE RESULTS: SIGNIFICANT CHANGE UP
CULTURE RESULTS: SIGNIFICANT CHANGE UP
SPECIMEN SOURCE: SIGNIFICANT CHANGE UP
SPECIMEN SOURCE: SIGNIFICANT CHANGE UP

## 2024-06-06 PROBLEM — N18.9 CHRONIC KIDNEY DISEASE, UNSPECIFIED: Chronic | Status: ACTIVE | Noted: 2024-05-28

## 2024-06-06 PROBLEM — M54.9 DORSALGIA, UNSPECIFIED: Chronic | Status: ACTIVE | Noted: 2024-05-28

## 2024-06-06 PROBLEM — I82.409 ACUTE EMBOLISM AND THROMBOSIS OF UNSPECIFIED DEEP VEINS OF UNSPECIFIED LOWER EXTREMITY: Chronic | Status: ACTIVE | Noted: 2024-05-28

## 2024-06-12 ENCOUNTER — APPOINTMENT (OUTPATIENT)
Dept: SPINE | Facility: CLINIC | Age: 84
End: 2024-06-12
Payer: MEDICARE

## 2024-06-12 ENCOUNTER — OUTPATIENT (OUTPATIENT)
Dept: OUTPATIENT SERVICES | Facility: HOSPITAL | Age: 84
LOS: 1 days | End: 2024-06-12
Payer: MEDICARE

## 2024-06-12 VITALS
OXYGEN SATURATION: 96 % | RESPIRATION RATE: 18 BRPM | DIASTOLIC BLOOD PRESSURE: 86 MMHG | SYSTOLIC BLOOD PRESSURE: 160 MMHG | HEIGHT: 68 IN | HEART RATE: 58 BPM | BODY MASS INDEX: 24.71 KG/M2 | WEIGHT: 163 LBS

## 2024-06-12 DIAGNOSIS — Z98.89 OTHER SPECIFIED POSTPROCEDURAL STATES: Chronic | ICD-10-CM

## 2024-06-12 DIAGNOSIS — Z95.5 PRESENCE OF CORONARY ANGIOPLASTY IMPLANT AND GRAFT: Chronic | ICD-10-CM

## 2024-06-12 PROCEDURE — 99203 OFFICE O/P NEW LOW 30 MIN: CPT

## 2024-06-12 PROCEDURE — 72082 X-RAY EXAM ENTIRE SPI 2/3 VW: CPT

## 2024-06-12 PROCEDURE — 72082 X-RAY EXAM ENTIRE SPI 2/3 VW: CPT | Mod: 26

## 2024-06-13 NOTE — ADDENDUM
[FreeTextEntry1] : The patient is an 84-year-old gentleman with a history of multiple prior lumbar surgeries who presents for evaluation of severe, worsening low back pain.  He states that he has had this pain since his previous operation.  He denies any radiation into the lower extremities but notes severe pain in the right buttocks.  The patient has both CT and MRI of his thoracic and lumbar spine available for review.  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  MRI shows a fluid collection within the disc at T12-L1 that may be due to hypermobility versus indolent infection.  Given the patient's symptoms and imaging findings, I feel that he would benefit from procedure to revise his fusion and extended up to T10.  Will also evacuate the T12-L1 disc space and send a biopsy I discussed with the patient the risks, benefits, alternatives and expected outcomes of the surgery.  The patient understood, all of his questions were answered to his satisfaction and he would like to proceed with surgical planning.  He was given instructions regarding medical pretesting as well as scheduling for the surgery.

## 2024-06-13 NOTE — ED ADULT NURSE NOTE - NURSING MUSC ROM
History:  Maureen was seen on 6/13/24 for the program optimization of her  Bilateral sequential Med EL Flex 24 Right & Flex 28 Left cochlear implants used in conjunction with her  Sonnet processors'  She was accompanied by her mother today  She presents in Program 2 bilaterally  She denies any recent equipment issues but has a replacement Left processor to program today  Akanksha was last seen on 1/8/24  She states that the Left ear is still not as clear as the Right ear, but she has been practicing listening with it more  She will be entering the 9th grade in the fall of 2024    Start/stop times: 930-1045      Programming:  Electrode impedances were evaluated for all intra cochlear electrodes and found to be within normal limits  No short or open electrodes were detected  Electrode compliance levels were assessed and found to be satisfactory  Datalogging reveals an average of 13.4 hours for the right side and 10.8 hours of daily use for the left side  Headset/Coil magnet strength: DL coil    Comfort levels were set to loud but comfortable across the array using a standard loudness ranking technique  Comfort Levels were swept   No facial nerve stimulation was observed or reported    Right Map details: FS4 strategy, 70-57905 Hz, CFG 80: P1 = Map 88 from 6.21.21, P2 = Map 134 from 6.13.24, P3 = Map 132 from 1.8.24    Left Map details: FS4 strategy, 350-27648 Hz, CFG 77: P1 = Map 133 from 6.13.24, P2 = Map 125 from 7.24.23, P3 = Map 131 from 1.8.24    Aided testing was performed in the soundfield while the patient faced the speaker. Aided responses to warble tones were obtained at 30 to 50 dBHL for 250-6000 Hz.  Aided word recognition testing was performed using recorded materials at 50 dBHL  Aided CNC words were 84 % correct for the Right side and 12% correct for the Left side  Aided [employer] sentences in quiet were 93% correct for the Right side and 0 % correct for the Left side    Summary:  The bilateral Sonnet processors  were updated with new psychophysical maps today.  Aided testing demonstrates good aided detection from each implant. Aided word understanding is excellent for the Right ear and poor for the Left ear    Treatment Plan:  Consistent use of the bilateral cochlear implants using the most comfortable program  Return in 6 months for program optimization  Follow up with otology for medical management of cochlear implant  Pursue/continue speech services for aural rehabilitation/auditory training       full range of motion in all extremities

## 2024-06-13 NOTE — RESULTS/DATA
[FreeTextEntry1] : 	 EXAM:  MRI LUMBAR SPINE WITHOUT CONTRAST  HISTORY: Lower back pain.  TECHNIQUE: Multiplanar, multi-sequential MRI of the lumbar spine was obtained on a 1.5T scanner using a standard protocol.  COMPARISON:  CT lumbar spine 2/22/2024  FINDINGS:  For purposes of this dictation, the last well-formed disc space will be labeled L5-S1.  OSSEOUS STRUCTURES   postoperative findings   Postoperative findings are redemonstrated s/p L1-S2 instrumented posterior fusion, that, in correlation with the prior 2/22/2024 CT include  bilateral pedicle screws from L1 through S1,  transsacral iliac screws,  interconnecting rods from L1 through S1,  disc cages at L4-5 and L5-S1,  partial laminectomies,  L2-L5 partial laminectomies.   Please see the prior CT for description of the operative hardware   right L1 transpedicle screw  For example, in the locale of the bone loss/erosion along the upper margin of the right L1 transverse pedicle screw described on the February 22, 2024 CT that may relate to a developing Schmorl node or superimposed inflammation, by MRI, we do see some heterogeneous signal within the vertebral body, but no well-defined drainable collection, although the evaluation is limited without intravenous contrast   As well, we see some relatively accentuated high signal within the T12-L1 disc, but such finding may represent simply residual nucleus pulposus, rather than a component of discitis.  I do recommend, however, the pending clinical correlation, we do follow this finding by MRI closely to evaluate for any interval increase in signal that would raise the index of suspicion for a worsening component of inflammation in this locale.   ALIGNMENT  coronal profile  Although, standardly, the coronal profile is assessed on standing plain film scoliosis radiographs, on the current MRI, supine and in repose, and in correlation with the prior CT thoracic spine 2/22/2024 and the prior MRI of thoracic spine from 2/24/2024, we see that the L1 and L2 vertebrae participate in the inferior limb of a larger rightward dextroconvex thoracolumbar curvature.  sagittal  Similarly, although standardly, the sagittal profile is assessed on standing plain films scoliosis radiographs, supine, in repose, at the level of the fusion, we do see slight   retrolisthesis of L2 now fused to L3, and  slight retrolisthesis of L3 now fused L4.   SPINAL CORD AND CONUS MEDULLARIS  No distal spinal cord lesion is seen.  The conus medullaris ends at the mid L1 level   PARASPINAL AND INTRA-ABDOMINAL SOFT TISSUES  renal high signal findings most commonly cysts  On the axial T2 sequence series 12 we see several high signal renal findings that include   in the right kidney  an 8 mm finding arising from the midpole medially as seen on the axial T2 sequence series 12 image 4/33 that appears likely stable compared to the 2/15/2020 MRI series 6 image 4/30,   a second 8 mm high signal finding that we currently see on the axial T2 sequence series 12 image 5/33 that appears increased in size compared to 2/15/2020 MRI that we see on the axial T2 sequence series 6 image 4/30 where it measured 5 mm,  a 15 mm high signal finding that we currently see on the axial T2 sequence series 12 image 7/33 again that most commonly represents a cyst but that has increased in size compared to measurements I make today of10 mm on the axial T2 sequence from the February 15, 2020 MRI on series 6 image 9/30.  Although these findings most likely represent cysts; however, is thought to be clinically warranted, an ultrasound and renal ultrasound may be performed for more definitive evaluation.  I attempted to compare these renal findings with prior studies where one might expect them to be apparent for example  the 10/2/2023 CT abdomen and pelvis the 7/12/2022 MRI lumbar spine  but the first study that allows for size comparison is the 2/15/2020 lumbar spine MRI.  As two of these findings have increased in size, pending clinical correlation, I recommend a renal ultrasound for evaluation of cystic versus solid composition.   INCLUDED THORACIC SPINE  Please see the MRI of the thoracic spine from 2/24/2024 and a CT of the thoracic spine from 2/22/2024.   DISCS and LEVELS  Evaluation of the discs and levels is technically limited secondary to distortion from the hardware related artifact.  T12-L1  At T12-L1, just above the level of the fusion, we do see a disc bulge, loss in disc signal and disc height, marked degenerative endplate findings, that result in mild effacement of the ventral thecal sac.   Based on the axial images, including axial T2 sequence from the MRI of the thoracic spine from February 24, 2024 series 20 images 50-55/63, I do not think there is direct impingement on the disc bulge along the ventral cord margin at this level.  L1-2  The thecal sac appears capacious at this level.  The neural foramen appear patent.  L2-3  The thecal sac appears capacious at this level.  There is mild distortion of the inferior aspect of the right L2-3 neural foramen thought to relate to right L3 screw related artifact.  Moderate narrowing of the inferior aspect of the left L2-3 neural foramen is seen but we see a clear fat plane outlining the left L2 foraminal nerve root segment.   L3-4  The thecal sac appears capacious at this level.   The inferior aspect of the right L3 neural foramen is obscured by hardware related artifact, but we see a clear fat plane outlining the right L3 foraminal nerve root segment.  Mild left neuroforaminal narrowing is seen.   L4-5  The thecal sac appears capacious at this level.  There may be slight narrowing of the inferior aspect of the L4-5 neural foramen bilaterally, but both L4 foraminal nerve root segments appear clearly outlined by fat within the neural foramen.  L5-S1  The thecal sac appears capacious at this level.  The right neural foramen appears mildly narrow secondary to heterogeneous signal within the anterior inferior aspect of the neural foramen that may relate to the fusion, but again a clear fat plane is seen outlining the right L5 foraminal nerve root segment.  The left L5-S1 neural foramen appears normal.   We do see undulation along the anterior aspect of the thecal sac and posterior aspect of the fusion from T12 through S1, where the posterior margin of the osseous fusion mass undulates in the sagittal plane as seen, for example, on series 9 image 7/16 on the sagittal T2 sequence, but the thecal sac remains capacious at these levels.   IMPRESSION:    Postoperative findings s/p L1-S2 posterior instrumented fusion.   The thecal sac appears capacious from L1 down through to the tip of the thecal sac at the mid S2 level.  In the locale of the bone loss/erosion along the upper margin of the right L1 transverse pedicle screw described on the February 22, 2024 CT that may relate to a developing Schmorl node or superimposed inflammation, by MRI, we do see some heterogeneous signal within the vertebral body, but no well-defined drainable collection. We see some heterogeneous high signal within the T12-L1 disc that most commonly represents residual nucleus pulposus; however, close follow-up MR imaging is recommended, for example, follow-up MRI in one month, that may be performed with intravenous contrast to evaluate for any superimposed acute component of discitis osteomyelitis in this locale.  High signal right and left renal findings described above that most likely represent cysts but that have increased in size since the 2/15/2020 MRI; I am recommending a renal ultrasound in order to assess for cystic versus solid position.   RECOMMENDATION  Pending clinical correlation, a follow-up MRI with and without contrast should be considered for close interval surveillance imaging e.g. in 1-2 months of the heterogeneous disc signal at T12-L1, just above the area of bone loss above the right L1 pedicle screw.  Renal ultrasound for evaluation of two findings, one involving the right and one in the left kidney,  that have increased in size since the 2020 MRI that most likely represent cysts but where ultrasound would be helpful for further evaluation for cystic versus solid composition.

## 2024-06-13 NOTE — HISTORY OF PRESENT ILLNESS
[> 3 months] : more  than 3 months [FreeTextEntry1] : lower back pain [de-identified] : 85 y/o male with hx  HTN, pre- T2DM, CAD s/p stent~ 4 yrs ago (on Plavix), [facility].  Today coming in for lower back pain radiating to right buttocks. He denies pain radiating to the lower extremities. Currently he can walk for approximately 1 block until the pain becomes debilitating and he has to stop. He also finds himself hunched over when ambulating. 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  He denies any urinary/bowel dysfunction, saddle anesthesia.  Patient currently followed by pain management Dr. Powell, phone: [phone] Include Pregnancy/Lactation Warning?: No

## 2024-06-13 NOTE — ASSESSMENT
[FreeTextEntry1] : Recommendation of T1-L1 fusion, revision of previous fusion. Risks and benefits discussed with patient. Patient verbalizes understanding.  Pre-operative clearance given to the patient.  [facility] wipes and instructions given to the patient.   Patient on Buprenorphine which will have to be stopped prior to surgery. He has an appointment with pain management on 07/10/2024

## 2024-08-02 ENCOUNTER — NON-APPOINTMENT (OUTPATIENT)
Age: 84
End: 2024-08-02

## 2024-08-07 PROBLEM — Z86.59 PERSONAL HISTORY OF OTHER MENTAL AND BEHAVIORAL DISORDERS: Chronic | Status: ACTIVE | Noted: 2024-08-02

## 2024-08-07 PROBLEM — G89.4 CHRONIC PAIN SYNDROME: Chronic | Status: ACTIVE | Noted: 2024-08-02

## 2024-08-07 PROBLEM — Z87.898 PERSONAL HISTORY OF OTHER SPECIFIED CONDITIONS: Chronic | Status: ACTIVE | Noted: 2024-08-02

## 2024-08-07 PROBLEM — I48.0 PAROXYSMAL ATRIAL FIBRILLATION: Chronic | Status: ACTIVE | Noted: 2024-08-02

## 2024-08-07 PROBLEM — I87.2 VENOUS INSUFFICIENCY (CHRONIC) (PERIPHERAL): Chronic | Status: ACTIVE | Noted: 2024-08-02

## 2024-08-07 PROBLEM — Z87.39 PERSONAL HISTORY OF OTHER DISEASES OF THE MUSCULOSKELETAL SYSTEM AND CONNECTIVE TISSUE: Chronic | Status: ACTIVE | Noted: 2024-08-02

## 2024-08-07 PROBLEM — N18.9 CHRONIC KIDNEY DISEASE, UNSPECIFIED: Chronic | Status: ACTIVE | Noted: 2024-08-02

## 2024-08-07 PROBLEM — Z86.718 PERSONAL HISTORY OF OTHER VENOUS THROMBOSIS AND EMBOLISM: Chronic | Status: ACTIVE | Noted: 2024-08-02

## 2024-08-09 VITALS
SYSTOLIC BLOOD PRESSURE: 142 MMHG | WEIGHT: 152.78 LBS | TEMPERATURE: 97 F | HEART RATE: 56 BPM | OXYGEN SATURATION: 97 % | RESPIRATION RATE: 17 BRPM | HEIGHT: 69 IN | DIASTOLIC BLOOD PRESSURE: 72 MMHG

## 2024-08-09 RX ORDER — ASPIRIN 81 MG
1 TABLET, DELAYED RELEASE (ENTERIC COATED) ORAL
Refills: 0 | DISCHARGE

## 2024-08-09 NOTE — ASU PATIENT PROFILE, ADULT - FALL HARM RISK - HARM RISK INTERVENTIONS

## 2024-08-09 NOTE — ASU PATIENT PROFILE, ADULT - NSICDXPASTSURGICALHX_GEN_ALL_CORE_FT
PAST SURGICAL HISTORY:  H/O heart artery stent     H/O neck surgery     H/O shoulder surgery RIGHT    H/O total knee replacement BILAT    History of back surgery      PAST SURGICAL HISTORY:  H/O heart artery stent 2019 x1    H/O neck surgery     H/O shoulder surgery RIGHT    H/O total knee replacement BILAT    History of back surgery

## 2024-08-11 ENCOUNTER — TRANSCRIPTION ENCOUNTER (OUTPATIENT)
Age: 84
End: 2024-08-11

## 2024-08-11 RX ORDER — POVIDONE-IODINE 10 %
1 SOLUTION, NON-ORAL TOPICAL ONCE
Refills: 0 | Status: COMPLETED | OUTPATIENT
Start: 2024-08-12 | End: 2024-08-12

## 2024-08-12 ENCOUNTER — APPOINTMENT (OUTPATIENT)
Dept: SPINE | Facility: HOSPITAL | Age: 84
End: 2024-08-12

## 2024-08-12 ENCOUNTER — INPATIENT (INPATIENT)
Facility: HOSPITAL | Age: 84
LOS: 7 days | Discharge: NURSING, REHAB OR LONG-TERM CARE | End: 2024-08-20
Attending: NEUROLOGICAL SURGERY | Admitting: NEUROLOGICAL SURGERY
Payer: MEDICARE

## 2024-08-12 DIAGNOSIS — Z98.89 OTHER SPECIFIED POSTPROCEDURAL STATES: Chronic | ICD-10-CM

## 2024-08-12 DIAGNOSIS — Z96.659 PRESENCE OF UNSPECIFIED ARTIFICIAL KNEE JOINT: Chronic | ICD-10-CM

## 2024-08-12 DIAGNOSIS — Z95.5 PRESENCE OF CORONARY ANGIOPLASTY IMPLANT AND GRAFT: Chronic | ICD-10-CM

## 2024-08-12 DIAGNOSIS — Z98.890 OTHER SPECIFIED POSTPROCEDURAL STATES: Chronic | ICD-10-CM

## 2024-08-12 LAB
ANION GAP SERPL CALC-SCNC: 7 MMOL/L — SIGNIFICANT CHANGE UP (ref 5–17)
APTT BLD: 27.4 SEC — SIGNIFICANT CHANGE UP (ref 24.5–35.6)
BASOPHILS # BLD AUTO: 0.02 K/UL — SIGNIFICANT CHANGE UP (ref 0–0.2)
BASOPHILS NFR BLD AUTO: 0.3 % — SIGNIFICANT CHANGE UP (ref 0–2)
BLD GP AB SCN SERPL QL: NEGATIVE — SIGNIFICANT CHANGE UP
BUN SERPL-MCNC: 40 MG/DL — HIGH (ref 7–23)
CALCIUM SERPL-MCNC: 9.5 MG/DL — SIGNIFICANT CHANGE UP (ref 8.4–10.5)
CHLORIDE SERPL-SCNC: 104 MMOL/L — SIGNIFICANT CHANGE UP (ref 96–108)
CO2 SERPL-SCNC: 23 MMOL/L — SIGNIFICANT CHANGE UP (ref 22–31)
CREAT SERPL-MCNC: 1.79 MG/DL — HIGH (ref 0.5–1.3)
EGFR: 36 ML/MIN/1.73M2 — LOW
EOSINOPHIL # BLD AUTO: 0.06 K/UL — SIGNIFICANT CHANGE UP (ref 0–0.5)
EOSINOPHIL NFR BLD AUTO: 1 % — SIGNIFICANT CHANGE UP (ref 0–6)
GLUCOSE BLDC GLUCOMTR-MCNC: 157 MG/DL — HIGH (ref 70–99)
GLUCOSE BLDC GLUCOMTR-MCNC: 219 MG/DL — HIGH (ref 70–99)
GLUCOSE SERPL-MCNC: 178 MG/DL — HIGH (ref 70–99)
HCT VFR BLD CALC: 32.7 % — LOW (ref 39–50)
HGB BLD-MCNC: 10.4 G/DL — LOW (ref 13–17)
IMM GRANULOCYTES NFR BLD AUTO: 0.8 % — SIGNIFICANT CHANGE UP (ref 0–0.9)
INR BLD: 1.05 — SIGNIFICANT CHANGE UP (ref 0.85–1.18)
LYMPHOCYTES # BLD AUTO: 0.67 K/UL — LOW (ref 1–3.3)
LYMPHOCYTES # BLD AUTO: 11.1 % — LOW (ref 13–44)
MAGNESIUM SERPL-MCNC: 2.1 MG/DL — SIGNIFICANT CHANGE UP (ref 1.6–2.6)
MCHC RBC-ENTMCNC: 27.2 PG — SIGNIFICANT CHANGE UP (ref 27–34)
MCHC RBC-ENTMCNC: 31.8 GM/DL — LOW (ref 32–36)
MCV RBC AUTO: 85.6 FL — SIGNIFICANT CHANGE UP (ref 80–100)
MONOCYTES # BLD AUTO: 0.19 K/UL — SIGNIFICANT CHANGE UP (ref 0–0.9)
MONOCYTES NFR BLD AUTO: 3.2 % — SIGNIFICANT CHANGE UP (ref 2–14)
NEUTROPHILS # BLD AUTO: 5.03 K/UL — SIGNIFICANT CHANGE UP (ref 1.8–7.4)
NEUTROPHILS NFR BLD AUTO: 83.6 % — HIGH (ref 43–77)
NRBC # BLD: 0 /100 WBCS — SIGNIFICANT CHANGE UP (ref 0–0)
PHOSPHATE SERPL-MCNC: 3.1 MG/DL — SIGNIFICANT CHANGE UP (ref 2.5–4.5)
PLATELET # BLD AUTO: 155 K/UL — SIGNIFICANT CHANGE UP (ref 150–400)
POTASSIUM SERPL-MCNC: 4.5 MMOL/L — SIGNIFICANT CHANGE UP (ref 3.5–5.3)
POTASSIUM SERPL-SCNC: 4.5 MMOL/L — SIGNIFICANT CHANGE UP (ref 3.5–5.3)
PROTHROM AB SERPL-ACNC: 12 SEC — SIGNIFICANT CHANGE UP (ref 9.5–13)
RBC # BLD: 3.82 M/UL — LOW (ref 4.2–5.8)
RBC # FLD: 16.6 % — HIGH (ref 10.3–14.5)
RH IG SCN BLD-IMP: NEGATIVE — SIGNIFICANT CHANGE UP
SODIUM SERPL-SCNC: 134 MMOL/L — LOW (ref 135–145)
WBC # BLD: 6.02 K/UL — SIGNIFICANT CHANGE UP (ref 3.8–10.5)
WBC # FLD AUTO: 6.02 K/UL — SIGNIFICANT CHANGE UP (ref 3.8–10.5)

## 2024-08-12 PROCEDURE — 76937 US GUIDE VASCULAR ACCESS: CPT | Mod: 26,59

## 2024-08-12 PROCEDURE — 22842 INSERT SPINE FIXATION DEVICE: CPT

## 2024-08-12 PROCEDURE — 20936 SP BONE AGRFT LOCAL ADD-ON: CPT

## 2024-08-12 PROCEDURE — 99291 CRITICAL CARE FIRST HOUR: CPT

## 2024-08-12 PROCEDURE — 22214 INCIS 1 VERTEBRAL SEG LUMBAR: CPT

## 2024-08-12 PROCEDURE — 20930 SP BONE ALGRFT MORSEL ADD-ON: CPT

## 2024-08-12 PROCEDURE — 99292 CRITICAL CARE ADDL 30 MIN: CPT

## 2024-08-12 PROCEDURE — 22325 TREAT SPINE FRACTURE: CPT | Mod: 59

## 2024-08-12 PROCEDURE — 22614 ARTHRD PST TQ 1NTRSPC EA ADD: CPT

## 2024-08-12 PROCEDURE — 22830 EXPLORATION OF SPINAL FUSION: CPT | Mod: 59

## 2024-08-12 PROCEDURE — 22513 PERQ VERTEBRAL AUGMENTATION: CPT | Mod: 59

## 2024-08-12 PROCEDURE — 22515 PERQ VERTEBRAL AUGMENTATION: CPT | Mod: 59

## 2024-08-12 PROCEDURE — 22612 ARTHRD PST TQ 1NTRSPC LUMBAR: CPT

## 2024-08-12 PROCEDURE — 36000 PLACE NEEDLE IN VEIN: CPT

## 2024-08-12 DEVICE — GRAFT BONE INFUSE KIT LG II: Type: IMPLANTABLE DEVICE | Status: FUNCTIONAL

## 2024-08-12 DEVICE — IMPLANTABLE DEVICE: Type: IMPLANTABLE DEVICE | Status: FUNCTIONAL

## 2024-08-12 DEVICE — KIT SYS AUTOFLEX W/ HV CEMENT: Type: IMPLANTABLE DEVICE | Status: FUNCTIONAL

## 2024-08-12 DEVICE — SET SCREW EVEREST CUSTOM: Type: IMPLANTABLE DEVICE | Status: FUNCTIONAL

## 2024-08-12 DEVICE — SURGIFOAM PAD 8CM X 12.5CM X 10MM (100): Type: IMPLANTABLE DEVICE | Status: FUNCTIONAL

## 2024-08-12 DEVICE — CELLERATE SURGICAL POWDER RX 5GM: Type: IMPLANTABLE DEVICE | Status: FUNCTIONAL

## 2024-08-12 DEVICE — FLOSEAL NT 5ML: Type: IMPLANTABLE DEVICE | Status: FUNCTIONAL

## 2024-08-12 DEVICE — GRAFT VITOSIS BIMODAL 20CC 25X100X8MM: Type: IMPLANTABLE DEVICE | Status: FUNCTIONAL

## 2024-08-12 DEVICE — ROD STR 5.5X500MM: Type: IMPLANTABLE DEVICE | Status: FUNCTIONAL

## 2024-08-12 RX ORDER — APREPITANT 40 MG/1
40 CAPSULE ORAL ONCE
Refills: 0 | Status: COMPLETED | OUTPATIENT
Start: 2024-08-12 | End: 2024-08-12

## 2024-08-12 RX ORDER — GABAPENTIN 100 MG
100 CAPSULE ORAL THREE TIMES A DAY
Refills: 0 | Status: DISCONTINUED | OUTPATIENT
Start: 2024-08-12 | End: 2024-08-12

## 2024-08-12 RX ORDER — METHOCARBAMOL 750 MG/1
500 TABLET, FILM COATED ORAL EVERY 8 HOURS
Refills: 0 | Status: DISCONTINUED | OUTPATIENT
Start: 2024-08-12 | End: 2024-08-20

## 2024-08-12 RX ORDER — CHLORHEXIDINE GLUCONATE 40 MG/ML
1 SOLUTION TOPICAL EVERY 12 HOURS
Refills: 0 | Status: DISCONTINUED | OUTPATIENT
Start: 2024-08-12 | End: 2024-08-12

## 2024-08-12 RX ORDER — CEFAZOLIN SODIUM 2 G/100ML
2000 INJECTION, SOLUTION INTRAVENOUS EVERY 8 HOURS
Refills: 0 | Status: COMPLETED | OUTPATIENT
Start: 2024-08-12 | End: 2024-08-13

## 2024-08-12 RX ORDER — HYDRALAZINE HCL 50 MG
10 TABLET ORAL ONCE
Refills: 0 | Status: COMPLETED | OUTPATIENT
Start: 2024-08-12 | End: 2024-08-12

## 2024-08-12 RX ORDER — DEXTROSE 15 G/33 G
12.5 GEL IN PACKET (GRAM) ORAL ONCE
Refills: 0 | Status: DISCONTINUED | OUTPATIENT
Start: 2024-08-12 | End: 2024-08-12

## 2024-08-12 RX ORDER — DEXTROSE 15 G/33 G
15 GEL IN PACKET (GRAM) ORAL ONCE
Refills: 0 | Status: DISCONTINUED | OUTPATIENT
Start: 2024-08-12 | End: 2024-08-12

## 2024-08-12 RX ORDER — TAMSULOSIN HYDROCHLORIDE 0.4 MG/1
0.4 CAPSULE ORAL AT BEDTIME
Refills: 0 | Status: DISCONTINUED | OUTPATIENT
Start: 2024-08-12 | End: 2024-08-17

## 2024-08-12 RX ORDER — DEXMEDETOMIDINE HYDROCHLORIDE IN 0.9% SODIUM CHLORIDE 4 UG/ML
0.2 INJECTION INTRAVENOUS
Qty: 400 | Refills: 0 | Status: DISCONTINUED | OUTPATIENT
Start: 2024-08-12 | End: 2024-08-13

## 2024-08-12 RX ORDER — ALLOPURINOL 300 MG
100 TABLET ORAL DAILY
Refills: 0 | Status: DISCONTINUED | OUTPATIENT
Start: 2024-08-12 | End: 2024-08-20

## 2024-08-12 RX ORDER — HYDROMORPHONE HYDROCHLORIDE 2 MG/1
0.5 TABLET ORAL ONCE
Refills: 0 | Status: DISCONTINUED | OUTPATIENT
Start: 2024-08-12 | End: 2024-08-12

## 2024-08-12 RX ORDER — ZOLPIDEM TARTRATE 5 MG/1
5 TABLET, FILM COATED ORAL AT BEDTIME
Refills: 0 | Status: DISCONTINUED | OUTPATIENT
Start: 2024-08-12 | End: 2024-08-13

## 2024-08-12 RX ORDER — NICARDIPINE HCL 20 MG
5 CAPSULE ORAL
Qty: 40 | Refills: 0 | Status: DISCONTINUED | OUTPATIENT
Start: 2024-08-12 | End: 2024-08-13

## 2024-08-12 RX ORDER — GLUCAGON INJECTION, SOLUTION 1 MG/.2ML
1 INJECTION, SOLUTION SUBCUTANEOUS ONCE
Refills: 0 | Status: DISCONTINUED | OUTPATIENT
Start: 2024-08-12 | End: 2024-08-12

## 2024-08-12 RX ORDER — ONDANSETRON 2 MG/ML
4 INJECTION, SOLUTION INTRAMUSCULAR; INTRAVENOUS EVERY 6 HOURS
Refills: 0 | Status: DISCONTINUED | OUTPATIENT
Start: 2024-08-12 | End: 2024-08-12

## 2024-08-12 RX ORDER — KETAMINE HYDROCHLORIDE 10 MG/ML
0.5 INJECTION INTRAMUSCULAR; INTRAVENOUS
Qty: 200 | Refills: 0 | Status: DISCONTINUED | OUTPATIENT
Start: 2024-08-12 | End: 2024-08-13

## 2024-08-12 RX ORDER — SENNA 187 MG
2 TABLET ORAL AT BEDTIME
Refills: 0 | Status: DISCONTINUED | OUTPATIENT
Start: 2024-08-12 | End: 2024-08-18

## 2024-08-12 RX ORDER — HYDROMORPHONE HYDROCHLORIDE 2 MG/1
2 TABLET ORAL ONCE
Refills: 0 | Status: DISCONTINUED | OUTPATIENT
Start: 2024-08-12 | End: 2024-08-12

## 2024-08-12 RX ORDER — FENTANYL CITRATE 50 UG/ML
50 INJECTION INTRAMUSCULAR; INTRAVENOUS ONCE
Refills: 0 | Status: DISCONTINUED | OUTPATIENT
Start: 2024-08-12 | End: 2024-08-12

## 2024-08-12 RX ORDER — FINASTERIDE 1 MG/1
5 TABLET, FILM COATED ORAL DAILY
Refills: 0 | Status: DISCONTINUED | OUTPATIENT
Start: 2024-08-12 | End: 2024-08-20

## 2024-08-12 RX ORDER — KETAMINE HYDROCHLORIDE 10 MG/ML
0.25 INJECTION INTRAMUSCULAR; INTRAVENOUS
Qty: 200 | Refills: 0 | Status: DISCONTINUED | OUTPATIENT
Start: 2024-08-12 | End: 2024-08-12

## 2024-08-12 RX ORDER — ACETAMINOPHEN 325 MG/1
1000 TABLET ORAL EVERY 8 HOURS
Refills: 0 | Status: COMPLETED | OUTPATIENT
Start: 2024-08-12 | End: 2024-08-13

## 2024-08-12 RX ORDER — DEXTROSE 15 G/33 G
25 GEL IN PACKET (GRAM) ORAL ONCE
Refills: 0 | Status: DISCONTINUED | OUTPATIENT
Start: 2024-08-12 | End: 2024-08-12

## 2024-08-12 RX ORDER — KETAMINE HYDROCHLORIDE 10 MG/ML
0.35 INJECTION INTRAMUSCULAR; INTRAVENOUS
Qty: 200 | Refills: 0 | Status: DISCONTINUED | OUTPATIENT
Start: 2024-08-12 | End: 2024-08-12

## 2024-08-12 RX ORDER — POLYETHYLENE GLYCOL 3350 17 G/17G
17 POWDER, FOR SOLUTION ORAL DAILY
Refills: 0 | Status: DISCONTINUED | OUTPATIENT
Start: 2024-08-12 | End: 2024-08-16

## 2024-08-12 RX ORDER — BUPRENORPHINE AND NALOXONE 8; 2 MG/1; MG/1
0.5 FILM BUCCAL; SUBLINGUAL
Refills: 0 | Status: DISCONTINUED | OUTPATIENT
Start: 2024-08-12 | End: 2024-08-13

## 2024-08-12 RX ORDER — LIDOCAINE HCL 20 MG/ML
1 VIAL (ML) INJECTION
Qty: 2 | Refills: 0 | Status: DISCONTINUED | OUTPATIENT
Start: 2024-08-12 | End: 2024-08-12

## 2024-08-12 RX ORDER — SODIUM CHLORIDE 9 MG/ML
1000 INJECTION INTRAMUSCULAR; INTRAVENOUS; SUBCUTANEOUS
Refills: 0 | Status: DISCONTINUED | OUTPATIENT
Start: 2024-08-12 | End: 2024-08-12

## 2024-08-12 RX ORDER — BUDESONIDE AND FORMOTEROL FUMARATE 80; 4.5 UG/1; UG/1
2 AEROSOL, METERED RESPIRATORY (INHALATION) ONCE
Refills: 0 | Status: DISCONTINUED | OUTPATIENT
Start: 2024-08-12 | End: 2024-08-20

## 2024-08-12 RX ORDER — CHLORHEXIDINE GLUCONATE 40 MG/ML
1 SOLUTION TOPICAL
Refills: 0 | Status: DISCONTINUED | OUTPATIENT
Start: 2024-08-12 | End: 2024-08-13

## 2024-08-12 RX ORDER — ACETAMINOPHEN 325 MG/1
1000 TABLET ORAL ONCE
Refills: 0 | Status: COMPLETED | OUTPATIENT
Start: 2024-08-12 | End: 2024-08-12

## 2024-08-12 RX ORDER — NALOXONE HCL 1 MG/ML
0.4 VIAL (ML) INJECTION ONCE
Refills: 0 | Status: DISCONTINUED | OUTPATIENT
Start: 2024-08-12 | End: 2024-08-20

## 2024-08-12 RX ORDER — METHADONE HYDROCHLORIDE 1 G/G
5 POWDER ORAL EVERY 8 HOURS
Refills: 0 | Status: DISCONTINUED | OUTPATIENT
Start: 2024-08-12 | End: 2024-08-13

## 2024-08-12 RX ORDER — ATROPINE SULFATE MONOHYDRATE 1 G/G
1 POWDER MISCELLANEOUS ONCE
Refills: 0 | Status: DISCONTINUED | OUTPATIENT
Start: 2024-08-12 | End: 2024-08-17

## 2024-08-12 RX ORDER — SODIUM CHLORIDE 9 MG/ML
1000 INJECTION INTRAMUSCULAR; INTRAVENOUS; SUBCUTANEOUS ONCE
Refills: 0 | Status: COMPLETED | OUTPATIENT
Start: 2024-08-12 | End: 2024-08-12

## 2024-08-12 RX ORDER — SODIUM CHLORIDE 9 MG/ML
1000 INJECTION INTRAMUSCULAR; INTRAVENOUS; SUBCUTANEOUS
Refills: 0 | Status: DISCONTINUED | OUTPATIENT
Start: 2024-08-12 | End: 2024-08-13

## 2024-08-12 RX ADMIN — FENTANYL CITRATE 50 MICROGRAM(S): 50 INJECTION INTRAMUSCULAR; INTRAVENOUS at 14:50

## 2024-08-12 RX ADMIN — DEXMEDETOMIDINE HYDROCHLORIDE IN 0.9% SODIUM CHLORIDE 3.47 MICROGRAM(S)/KG/HR: 4 INJECTION INTRAVENOUS at 18:03

## 2024-08-12 RX ADMIN — HYDROMORPHONE HYDROCHLORIDE 0.5 MILLIGRAM(S): 2 TABLET ORAL at 14:51

## 2024-08-12 RX ADMIN — ACETAMINOPHEN 400 MILLIGRAM(S): 325 TABLET ORAL at 18:27

## 2024-08-12 RX ADMIN — Medication 25 MG/HR: at 18:26

## 2024-08-12 RX ADMIN — ONDANSETRON 4 MILLIGRAM(S): 2 INJECTION, SOLUTION INTRAMUSCULAR; INTRAVENOUS at 19:25

## 2024-08-12 RX ADMIN — SODIUM CHLORIDE 70 MILLILITER(S): 9 INJECTION INTRAMUSCULAR; INTRAVENOUS; SUBCUTANEOUS at 15:53

## 2024-08-12 RX ADMIN — DEXMEDETOMIDINE HYDROCHLORIDE IN 0.9% SODIUM CHLORIDE 3.47 MICROGRAM(S)/KG/HR: 4 INJECTION INTRAVENOUS at 15:00

## 2024-08-12 RX ADMIN — HYDROMORPHONE HYDROCHLORIDE 2 MILLIGRAM(S): 2 TABLET ORAL at 15:03

## 2024-08-12 RX ADMIN — HYDROMORPHONE HYDROCHLORIDE 0.5 MILLIGRAM(S): 2 TABLET ORAL at 15:04

## 2024-08-12 RX ADMIN — KETAMINE HYDROCHLORIDE 17.3 MG/KG/HR: 10 INJECTION INTRAMUSCULAR; INTRAVENOUS at 18:39

## 2024-08-12 RX ADMIN — HYDROMORPHONE HYDROCHLORIDE 2 MILLIGRAM(S): 2 TABLET ORAL at 15:04

## 2024-08-12 RX ADMIN — FENTANYL CITRATE 50 MICROGRAM(S): 50 INJECTION INTRAMUSCULAR; INTRAVENOUS at 15:04

## 2024-08-12 RX ADMIN — CEFAZOLIN SODIUM 100 MILLIGRAM(S): 2 INJECTION, SOLUTION INTRAVENOUS at 18:02

## 2024-08-12 RX ADMIN — SODIUM CHLORIDE 1000 MILLILITER(S): 9 INJECTION INTRAMUSCULAR; INTRAVENOUS; SUBCUTANEOUS at 18:27

## 2024-08-12 RX ADMIN — APREPITANT 40 MILLIGRAM(S): 40 CAPSULE ORAL at 07:10

## 2024-08-12 RX ADMIN — CHLORHEXIDINE GLUCONATE 1 APPLICATION(S): 40 SOLUTION TOPICAL at 15:05

## 2024-08-12 RX ADMIN — ACETAMINOPHEN 1000 MILLIGRAM(S): 325 TABLET ORAL at 07:11

## 2024-08-12 RX ADMIN — CHLORHEXIDINE GLUCONATE 1 APPLICATION(S): 40 SOLUTION TOPICAL at 07:13

## 2024-08-12 RX ADMIN — Medication 4: at 19:23

## 2024-08-12 RX ADMIN — Medication 10 MILLIGRAM(S): at 17:16

## 2024-08-12 RX ADMIN — ACETAMINOPHEN 1000 MILLIGRAM(S): 325 TABLET ORAL at 18:48

## 2024-08-12 RX ADMIN — SODIUM CHLORIDE 70 MILLILITER(S): 9 INJECTION INTRAMUSCULAR; INTRAVENOUS; SUBCUTANEOUS at 18:27

## 2024-08-12 RX ADMIN — Medication 1 APPLICATION(S): at 07:13

## 2024-08-12 NOTE — CONSULT NOTE ADULT - SUBJECTIVE AND OBJECTIVE BOX
PAIN MANAGEMENT CONSULT NOTE    Chief Complaint:    HPI:  83 y/o male with hx HTN, HLD, BPH,  T2DM (Hgba1c 6.7), CAD s/p stent~ 4 yrs ago (on Plavix), +antiphospholipid antibody w history of B/L LE DVT was on elliquis (4/2023 dx), CKD, Nephrolithiasis, Gout, Interstitial Lung Disease, L1-S1 fusion in 2020 @[facility].   Hx lower back pain radiating to right buttocks. He denies pain radiating to the lower extremities. Currently he can walk for approximately 1 block until the pain becomes debilitating and he has to stop. He also finds himself hunched over when ambulating. 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   He denies any urinary/bowel dysfunction, saddle anesthesia.    (12 Aug 2024 06:34)      PAST MEDICAL & SURGICAL HISTORY:  Diabetes      BPH (benign prostatic hypertrophy)      Hyperlipidemia      Gout      HTN (hypertension)      CAD (coronary artery disease)  CARDIAC STENT X1 2019      Chronic kidney disease (CKD)      Back pain      Deep vein thrombosis (DVT)  BILAT 2024      H/O kyphosis  THORACOLUMBAR REGION      Venous insufficiency      H/O: depression      H/O edema      Chronic pain syndrome      PAF (paroxysmal atrial fibrillation)      H/O heart artery stent  2019 x1      H/O shoulder surgery  RIGHT      History of back surgery      H/O neck surgery      H/O total knee replacement  BILAT          FAMILY HISTORY:      SOCIAL HISTORY:  [ ] Denies Smoking, Alcohol, or Drug Use    HOME MEDICATIONS:   Please refer to initial HNP    PAIN HOME MEDICATIONS:  iSTOP review (2024)  C	N	08/09/2024	08/09/2024	BUPRENORPHINE-NALOX 2-0.5MG FM		4.0	2	GIRISH SABILLON	Medicare	[facility]  C	Y	07/19/2024	07/19/2024	ZOLPIDEM TARTRATE 5 MG TABLET		30.0	30	MD VASQUES ANDREA	Medicare	[facility]  C	N	06/10/2024	06/19/2024	BUPRENORPHINE-NALOX 2-0.5MG FM		30.0	15	GIRISH SABILLON	[facility]  B	N	06/11/2024	06/11/2024	BUPRENORPHINE-NALOX 2-0.5MG FM		20.0	10	GIRISH SABILLON	[employer].  C	N	06/06/2024	06/06/2024	ZOLPIDEM TARTRATE 5 MG TABLET		30.0	30	MD CAPRICE, ANDREA	Medicare	[facility]  C	N	06/05/2024	06/05/2024	OXYCODONE-ACETAMINOPHEN 		120.0	30	MD CAPRICE, ANDREA	Medicare	[facility]  C	N	06/05/2024	06/05/2024	HYDROCODONE-ACETAMIN  MG		24.0	6	ZHANG, SI	Medicare	[facility]  B	N	05/31/2024	05/31/2024	HYDROCODONE-ACETAMIN  MG		24.0	4	[facility]  C	N	02/20/2024	03/21/2024	OXYCODONE HCL (IR) 15 MG TAB		180.0	30	MD CAPRICE, ANDREA	Medicare	[facility]  C	N	02/05/2024	02/17/2024	OXYCODONE HCL (IR) 15 MG TAB		180.0	30	MD CAPRICE, ANDREA	Medicare	[facility]  C	N	02/13/2024	02/13/2024	DIAZEPAM 5 MG TABLET		2.0	1	MARIAMA FORTE	Medicare	[facility]  A	N	02/12/2024	02/12/2024	DIAZEPAM 2 MG TABLET		10.0	10	MD CAPRICE, ANDREA	Medicare	[employer].  A	N	01/17/2024	01/26/2024	OXYCODONE HCL (IR) 15 MG TAB		180.0	30	MD CAPRICE, ANDREA	Medicare	[employer].    Allergies    latex (Blisters)  No Known Drug Allergies    Intolerances        PAIN MEDICATIONS:    Heme:    Antibiotics:    Cardiovascular:    GI:    Endocrine:    All Other Medications:      Vital Signs Last 24 Hrs  T(C): --  T(F): --  HR: --  BP: --  BP(mean): --  RR: --  SpO2: --        LABS:                RADIOLOGY:    Drug Screen:        REVIEW OF SYSTEMS:  CONSTITUTIONAL: Denies fever or fatigue   EYES: Denies eye pain, visual disturbances  HEENT: Denies difficulty hearing, throat/neck pain or stiffness  RESPIRATORY: Denies SOB, cough, wheezing  CARDIOVASCULAR: Denies chest pain, palpitations.   GASTROINTESTINAL: Endorses +flatus, BMs. Denies nausea, vomiting, abdominal or epigastric pain.   GENITOURINARY: Denies dysuria, frequency, or incontinence  NEUROLOGICAL: Endorses *** numbness, tingling. Denies headaches, loss of strength, tremors, dizziness or lightheadedness with pain medications.   MUSCULOSKELETAL: Denies joint pain or swelling      FUNCTIONAL ASSESSMENT:  PAIN SCORE AT REST:         SCALE USED: (1-10 VNRS)  PAIN SCORE WITH ACTIVITY:         SCALE USED: (1-10 VNRS)    PAIN ASSESSMENT:    FOCUSED PHYSICAL EXAM  GENERAL: Laying in bed, NAD  NEURO: CN II-XII grossly intact, EOMI  PULM: unlabored  CV: Regular rate and rhythm  ABDOMEN: Soft, Nontender, Nondistended  EXTREMITIES:  2+ Peripheral Pulses, No clubbing, cyanosis, or edema  SKIN: No rashes or lesions      ASSESSMENT: 83yo male with PMHx multiple prior lumbar surgeries who presents for evaluation of severe, worsening low back pain. Presents today for extension of fusion to T10..      PLAN:   -     - monitor closely for oversedation, ensure narcan is ordered  - escalate bowel regimen as needed for bowel movement daily  ***recs not yet finalized***    - Bowel regimen: Senna    - Nausea ppx: Zofran as needed  - Functional Goals: Pt will get OOB with PT today. Pt will resume previous level of activity without impairment from surgery.   - Additional Consults: None recommended.   - Additional Labs/Imaging:  None recommended.     - Discharge Planning: per primary team  - Pain Management follow up plan: will continue to follow    Plan d/w Dr. Samuel.     Ruthie Harrison NP  Acute Pain Service   PAIN MANAGEMENT CONSULT NOTE    Chief Complaint:    HPI:  83 y/o male with hx HTN, HLD, BPH,  T2DM (Hgba1c 6.7), CAD s/p stent~ 4 yrs ago (on Plavix), +antiphospholipid antibody w history of B/L LE DVT was on elliquis (4/2023 dx), CKD, Nephrolithiasis, Gout, Interstitial Lung Disease, L1-S1 fusion in 2020 @[facility].   Hx lower back pain radiating to right buttocks. He denies pain radiating to the lower extremities. Currently he can walk for approximately 1 block until the pain becomes debilitating and he has to stop. He also finds himself hunched over when ambulating. 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   He denies any urinary/bowel dysfunction, saddle anesthesia.    (12 Aug 2024 06:34)      PAST MEDICAL & SURGICAL HISTORY:  Diabetes      BPH (benign prostatic hypertrophy)      Hyperlipidemia      Gout      HTN (hypertension)      CAD (coronary artery disease)  CARDIAC STENT X1 2019      Chronic kidney disease (CKD)      Back pain      Deep vein thrombosis (DVT)  BILAT 2024      H/O kyphosis  THORACOLUMBAR REGION      Venous insufficiency      H/O: depression      H/O edema      Chronic pain syndrome      PAF (paroxysmal atrial fibrillation)      H/O heart artery stent  2019 x1      H/O shoulder surgery  RIGHT      History of back surgery      H/O neck surgery      H/O total knee replacement  BILAT          FAMILY HISTORY:      SOCIAL HISTORY:  [ ] Denies Smoking, Alcohol, or Drug Use    HOME MEDICATIONS:   Please refer to initial HNP    PAIN HOME MEDICATIONS:  iSTOP review (2024)  C	N	08/09/2024	08/09/2024	BUPRENORPHINE-NALOX 2-0.5MG FM		4.0	2	GIRISH SABILLON	Medicare	[facility]  C	Y	07/19/2024	07/19/2024	ZOLPIDEM TARTRATE 5 MG TABLET		30.0	30	MD VASQUES ANDREA	Medicare	[facility]  C	N	06/10/2024	06/19/2024	BUPRENORPHINE-NALOX 2-0.5MG FM		30.0	15	GIRISH SABILLON	[facility]  B	N	06/11/2024	06/11/2024	BUPRENORPHINE-NALOX 2-0.5MG FM		20.0	10	GIRISH SABILLON	[employer].  C	N	06/06/2024	06/06/2024	ZOLPIDEM TARTRATE 5 MG TABLET		30.0	30	MD CAPRICE, ANDREA	Medicare	[facility]  C	N	06/05/2024	06/05/2024	OXYCODONE-ACETAMINOPHEN 		120.0	30	MD CAPRICE, ANDREA	Medicare	[facility]  C	N	06/05/2024	06/05/2024	HYDROCODONE-ACETAMIN  MG		24.0	6	ZHANG, SI	Medicare	[facility]  B	N	05/31/2024	05/31/2024	HYDROCODONE-ACETAMIN  MG		24.0	4	[facility]  C	N	02/20/2024	03/21/2024	OXYCODONE HCL (IR) 15 MG TAB		180.0	30	MD CAPRICE, ANDREA	Medicare	[facility]  C	N	02/05/2024	02/17/2024	OXYCODONE HCL (IR) 15 MG TAB		180.0	30	MD CAPRICE, ANDREA	Medicare	[facility]  C	N	02/13/2024	02/13/2024	DIAZEPAM 5 MG TABLET		2.0	1	MARIAMA FORET	Medicare	[facility]  A	N	02/12/2024	02/12/2024	DIAZEPAM 2 MG TABLET		10.0	10	MD CAPRICE, ANDREA	Medicare	[employer].  A	N	01/17/2024	01/26/2024	OXYCODONE HCL (IR) 15 MG TAB		180.0	30	MD CAPRICE, ANDREA	Medicare	[employer].    Allergies    latex (Blisters)  No Known Drug Allergies    Intolerances        PAIN MEDICATIONS:    Heme:    Antibiotics:    Cardiovascular:    GI:    Endocrine:    All Other Medications:      Vital Signs Last 24 Hrs  T(C): --  T(F): --  HR: --  BP: --  BP(mean): --  RR: --  SpO2: --        LABS:                RADIOLOGY:    Drug Screen:        REVIEW OF SYSTEMS:  CONSTITUTIONAL: Denies fever or fatigue   EYES: Denies eye pain, visual disturbances  HEENT: Denies difficulty hearing, throat/neck pain or stiffness  RESPIRATORY: Denies SOB, cough, wheezing  CARDIOVASCULAR: Denies chest pain, palpitations.   GASTROINTESTINAL: Endorses +flatus, BMs. Denies nausea, vomiting, abdominal or epigastric pain.   GENITOURINARY: Denies dysuria, frequency, or incontinence  NEUROLOGICAL: Denies numbness, tingling. Denies headaches, loss of strength, tremors, dizziness or lightheadedness with pain medications.   MUSCULOSKELETAL: Denies joint pain or swelling      FUNCTIONAL ASSESSMENT:  PAIN SCORE AT REST:         SCALE USED: (1-10 VNRS)  PAIN SCORE WITH ACTIVITY:         SCALE USED: (1-10 VNRS)    PAIN ASSESSMENT:  - 10/10 incisional pain    FOCUSED PHYSICAL EXAM  GENERAL: Laying in bed, crying 2/2 pain  NEURO: CN II-XII grossly intact, EOMI  PULM: unlabored  CV: Regular rate and rhythm  ABDOMEN: Soft, Nontender, Nondistended  EXTREMITIES:  2+ Peripheral Pulses, No clubbing, cyanosis, or edema  SKIN: No rashes or lesions      ASSESSMENT: 85yo male with PMHx multiple prior lumbar surgeries who presents for evaluation of severe, worsening low back pain. Presents today for extension of fusion to T10..      PLAN:   - give IV dilaudid 2mg bolus now  - continue ketamine gtt 0.25mg/kg/hr  - start IV tylenol 1gm q8h, convert to PO as able  - continue home suboxone 2mg BID  - start robaxin 500mg q8h  - hold gabapentin  - monitor closely for oversedation, ensure narcan is ordered  - escalate bowel regimen as needed for bowel movement daily    - Bowel regimen: Senna    - Nausea ppx: Zofran as needed  - Functional Goals: Pt will get OOB with PT today. Pt will resume previous level of activity without impairment from surgery.   - Additional Consults: None recommended.   - Additional Labs/Imaging:  None recommended.     - Discharge Planning: per primary team  - Pain Management follow up plan: will continue to follow    Plan d/w Dr. Samuel.     Ruthie Harrison NP  Acute Pain Service   PAIN MANAGEMENT CONSULT NOTE    Chief Complaint:    HPI:  83 y/o male with hx HTN, HLD, BPH,  T2DM (Hgba1c 6.7), CAD s/p stent~ 4 yrs ago (on Plavix), +antiphospholipid antibody w history of B/L LE DVT was on elliquis (4/2023 dx), CKD, Nephrolithiasis, Gout, Interstitial Lung Disease, L1-S1 fusion in 2020 @[facility].   Hx lower back pain radiating to right buttocks. He denies pain radiating to the lower extremities. Currently he can walk for approximately 1 block until the pain becomes debilitating and he has to stop. He also finds himself hunched over when ambulating. 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   He denies any urinary/bowel dysfunction, saddle anesthesia.    (12 Aug 2024 06:34)      PAST MEDICAL & SURGICAL HISTORY:  Diabetes      BPH (benign prostatic hypertrophy)      Hyperlipidemia      Gout      HTN (hypertension)      CAD (coronary artery disease)  CARDIAC STENT X1 2019      Chronic kidney disease (CKD)      Back pain      Deep vein thrombosis (DVT)  BILAT 2024      H/O kyphosis  THORACOLUMBAR REGION      Venous insufficiency      H/O: depression      H/O edema      Chronic pain syndrome      PAF (paroxysmal atrial fibrillation)      H/O heart artery stent  2019 x1      H/O shoulder surgery  RIGHT      History of back surgery      H/O neck surgery      H/O total knee replacement  BILAT          FAMILY HISTORY:      SOCIAL HISTORY:  [ ] Denies Smoking, Alcohol, or Drug Use    HOME MEDICATIONS:   Please refer to initial HNP    PAIN HOME MEDICATIONS:  iSTOP review (2024)  C	N	08/09/2024	08/09/2024	BUPRENORPHINE-NALOX 2-0.5MG FM		4.0	2	GIRISH SABILLON	Medicare	[facility]  C	Y	07/19/2024	07/19/2024	ZOLPIDEM TARTRATE 5 MG TABLET		30.0	30	MD VASQUES ANDREA	Medicare	[facility]  C	N	06/10/2024	06/19/2024	BUPRENORPHINE-NALOX 2-0.5MG FM		30.0	15	GIRISH SABILLON	[facility]  B	N	06/11/2024	06/11/2024	BUPRENORPHINE-NALOX 2-0.5MG FM		20.0	10	GIRISH SABILLON	[employer].  C	N	06/06/2024	06/06/2024	ZOLPIDEM TARTRATE 5 MG TABLET		30.0	30	MD CAPRICE, ANDREA	Medicare	[facility]  C	N	06/05/2024	06/05/2024	OXYCODONE-ACETAMINOPHEN 		120.0	30	MD CAPRICE, ANDREA	Medicare	[facility]  C	N	06/05/2024	06/05/2024	HYDROCODONE-ACETAMIN  MG		24.0	6	ZHANG, SI	Medicare	[facility]  B	N	05/31/2024	05/31/2024	HYDROCODONE-ACETAMIN  MG		24.0	4	[facility]  C	N	02/20/2024	03/21/2024	OXYCODONE HCL (IR) 15 MG TAB		180.0	30	MD CAPRICE, ANDREA	Medicare	[facility]  C	N	02/05/2024	02/17/2024	OXYCODONE HCL (IR) 15 MG TAB		180.0	30	MD CAPRICE, ANDREA	Medicare	[facility]  C	N	02/13/2024	02/13/2024	DIAZEPAM 5 MG TABLET		2.0	1	MARIAMA FORTE	Medicare	[facility]  A	N	02/12/2024	02/12/2024	DIAZEPAM 2 MG TABLET		10.0	10	MD CAPRICE, SHAKIRA	Medicare	[employer].  A	N	01/17/2024	01/26/2024	OXYCODONE HCL (IR) 15 MG TAB		180.0	30	MD CAPRICE, ANDREA	Medicare	[employer].    Allergies    latex (Blisters)  No Known Drug Allergies    Intolerances        PAIN MEDICATIONS:    Heme:    Antibiotics:    Cardiovascular:    GI:    Endocrine:    All Other Medications:      Vital Signs Last 24 Hrs  T(C): --  T(F): --  HR: --  BP: --  BP(mean): --  RR: --  SpO2: --        LABS:                RADIOLOGY:    Drug Screen:        REVIEW OF SYSTEMS:  [facility] 2/2 pain      FUNCTIONAL ASSESSMENT:  PAIN SCORE AT REST:         SCALE USED: (1-10 VNRS)  PAIN SCORE WITH ACTIVITY:         SCALE USED: (1-10 VNRS)    PAIN ASSESSMENT:  - 10/10 incisional pain    FOCUSED PHYSICAL EXAM  GENERAL: Laying in bed, crying 2/2 pain  NEURO: CN II-XII grossly intact, EOMI  PULM: unlabored  CV: Regular rate and rhythm  ABDOMEN: Soft, Nontender, Nondistended  EXTREMITIES:  2+ Peripheral Pulses, No clubbing, cyanosis, or edema  SKIN: No rashes or lesions      ASSESSMENT: 83yo male with PMHx multiple prior lumbar surgeries who presents for evaluation of severe, worsening low back pain. Presents today for extension of fusion to T10..      PLAN:   - give IV dilaudid 2mg bolus now  - continue ketamine gtt 0.25mg/kg/hr  - start PO methadone 5mg q8h  - start IV tylenol 1gm q8h, convert to PO as able  - continue home suboxone 2mg BID  - start robaxin 500mg q8h  - hold gabapentin  - monitor closely for oversedation, ensure narcan is ordered  - escalate bowel regimen as needed for bowel movement daily    - Bowel regimen: Senna    - Nausea ppx: Zofran as needed  - Functional Goals: Pt will get OOB with PT today. Pt will resume previous level of activity without impairment from surgery.   - Additional Consults: None recommended.   - Additional Labs/Imaging:  None recommended.     - Discharge Planning: per primary team  - Pain Management follow up plan: will continue to follow    Plan d/w Dr. Samuel.     Ruthie Harrison NP  Acute Pain Service

## 2024-08-12 NOTE — H&P ADULT - NSHPPHYSICALEXAM_GEN_ALL_CORE
Constitutional: 83 y/o male awake, alert in no acute distress.  Eyes:  Sclera anicteric, conjunctiva noninjected.  ENMT: Oropharyngeal mucosa moist, pink. Tongue midline.    Neck: Neck supple, FROM.  No appreciable lymphadenopathy.  Back:  Prior incisions noted, well-healed.  Respiratory: Clear to auscultation bilaterally.  No rales, rhonchi, wheezes.  Cardiovascular: Regular rate and rhythm.  S1, S2 heard.  Gastrointestinal:  Soft, nontender, nondistended.  +BS.  Genitourinary:  Deferred.  Rectal: Deferred.  Vascular: Extremities warm, no ulcers, no discoloration of skin.   Neurological: Gen: AA&O x 3, conversant, appropriate.      CN II-XII grossly intact.    Motor: MURPHY x 4, 5/5 throughout UE, 4+/5 throughout LE limited d/t back pain.    Sens: Sensation intact to light touch throughout.     Plantar downgoing bilaterally.  No clonus.      No pronator drift, no dysmetria.  Skin: Warm, dry, no erythema. 2+ pitting edema B/L LE, chronic venous stasis changes of skin of lower legs and rubor noted.

## 2024-08-12 NOTE — PROGRESS NOTE ADULT - SUBJECTIVE AND OBJECTIVE BOX
NEUROSURGERY POST OP NOTE:    POD# 0 S/P revision T10-L2 fusion, laminectomy T2/L1.    S: Patient seen and examined at bedside. Agitated due to pain.       T(C): 32.5 (08-12-24 @ 14:28), Max: 36.2 (08-12-24 @ 07:35)  HR: 52 (08-12-24 @ 17:20) (52 - 82)  BP: 172/91 (08-12-24 @ 17:00) (121/58 - 240/120)  RR: 12 (08-12-24 @ 17:20) (12 - 34)  SpO2: 100% (08-12-24 @ 17:20) (91% - 100%)      08-12-24 @ 07:01  -  08-12-24 @ 17:30  --------------------------------------------------------  IN: 523 mL / OUT: 305 mL / NET: 218 mL        acetaminophen   IVPB .. 1000 milliGRAM(s) IV Intermittent every 8 hours  allopurinol 100 milliGRAM(s) Oral daily  atorvastatin 80 milliGRAM(s) Oral at bedtime  budesonide 160 MICROgram(s)/formoterol 4.5 MICROgram(s) Inhaler 2 Puff(s) Inhalation once  buprenorphine 2 mG/naloxone 0.5 mG SL Film 0.5 Film(s) SubLingual two times a day  ceFAZolin   IVPB 2000 milliGRAM(s) IV Intermittent every 8 hours  chlorhexidine 2% Cloths 1 Application(s) Topical <User Schedule>  dexMEDEtomidine Infusion 0.2 MICROgram(s)/kG/Hr IV Continuous <Continuous>  finasteride 5 milliGRAM(s) Oral daily  insulin lispro (ADMELOG) corrective regimen sliding scale   SubCutaneous every 6 hours  ketamine Infusion 0.5 mG/kG/Hr IV Continuous <Continuous>  methadone    Tablet 5 milliGRAM(s) Oral every 8 hours  methocarbamol 500 milliGRAM(s) Oral every 8 hours  multivitamin 1 Tablet(s) Oral daily  niCARdipine Infusion 5 mG/Hr IV Continuous <Continuous>  ondansetron Injectable 4 milliGRAM(s) IV Push every 6 hours  polyethylene glycol 3350 17 Gram(s) Oral daily  senna 2 Tablet(s) Oral at bedtime  sodium chloride 0.9% Bolus 1000 milliLiter(s) IV Bolus once  sodium chloride 0.9%. 1000 milliLiter(s) IV Continuous <Continuous>  tamsulosin 0.4 milliGRAM(s) Oral at bedtime  zolpidem 5 milliGRAM(s) Oral at bedtime PRN      RADIOLOGY:     Exam:  Constitutional: Patient is resting in stretcher, in moderate painful distress.   Respiratory: breathing non-labored, symmetrical chest wall movement  Cardiovascuar: RRR, no murmurs  Gastrointestinal: abdomen soft, non tender  Genitourinary: exam deffered  Neurological: Opens eyes spontaneous. Awake, agitated d/t pain. Oriented to self. Following simple commands.   Cranial Nerves: pupils 2mm, equal, round and reactive to light. EOMI. No facial asymmetry.   Motor: Moving all 4 extremities spontaneously, symmetric and strong 5/5  Sensation: intact to light touch in all extremities  WOUND/DRAINS: thoracolumbar incision with aquacel dressing in place, C/D/I, sJP x1, dHMV x1 in place with sanguinous output    DEVICES:       Assessment:   83 y/o male with hx HTN, HLD, BPH,  T2DM (Hgba1c 6.7), CAD s/p stent about 4 yrs ago (on Plavix), +antiphospholipid antibody w history of B/L LE DVT was on elliquis (4/2023 dx), CKD, Nephrolithiasis, Gout, Interstitial Lung Disease, L1-S1 fusion in 2020 @[facility]. Was worked up for severe, worsening low back pain, worse with ambulation. 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 Now s/p revision T10-L2 fusion, laminectomy T2/L1 (8/12).    Neuro:  - Neuro/vitals q1hr  - dHMV x1, sJP x1, per plastics  - pending postop standing XRs  - sedation w/ precedex gtt  - Pain control: ketamine gtt, methadone 5q8h, robaxin, tylenol standing, c/w home buprenorphine  - pain managaement following  - Insomnia: hold home zolpidem 5mg qhs    Cardio:  - SBP<160, cardene gtt  - Hx cardiac stents: holding home ASA, plavix   - HLD: atorvastatin 80mg qd    Pulm:  - RA  - ILD:     GI:  - ADAT  - Bowel regimen    Renal:  - IVF  - +rothman (uretral stricture)  - CKD  - BPH: home finaseteride, flomax  - Gout: allopurinol 100mg daily    Heme:  - SCDs for dvt ppx  - hx b/l LE DVTs: hold home Xarelto, pending LE duplex    ID:  - postop ancef  - afebrile    Dispo: ICU status, full code, PT/OT pending    D/w Dr Hernandez, Dr Guzmán

## 2024-08-12 NOTE — H&P ADULT - NSHPLABSRESULTS_GEN_ALL_CORE
In the locale of the bone loss/erosion along the upper margin of the right L1 transverse pedicle screw described on the February 22, 2024 CT that may relate to a developing Schmorl node or superimposed inflammation, by MRI, we do see some heterogeneous signal within the vertebral body, but no well-defined drainable collection. We see some heterogeneous high signal within the T12-L1 disc that most commonly represents residual nucleus pulposus; however, close follow-up MR imaging is recommended, for example, follow-up MRI in one month, that may be performed with intravenous contrast to evaluate for any superimposed acute component of discitis osteomyelitis in this locale.

## 2024-08-12 NOTE — PROGRESS NOTE ADULT - SUBJECTIVE AND OBJECTIVE BOX
=========================  NSICU ATTENDING PROGRESS NOTE  =========================    83 y/o male with history of HTN, HLD, BPH, T2DM (Hgba1c 6.7), CAD s/p stent~ 4 yrs ago (on Plavix), +antiphospholipid antibody w history of B/L LE DVT was on elliquis (4/2023 dx), CKD, Nephrolithiasis, Gout, Interstitial Lung Disease, L1-S1 fusion in 2020 @[facility].   Hx lower back pain radiating to right buttocks. He denies pain radiating to the lower extremities. Currently he can walk for approximately 1 block until the pain becomes debilitating and he has to stop. He also finds himself hunched over when ambulating. 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   He denies any urinary/bowel dysfunction, saddle anesthesia. Today patient also reports that he did not stop the suboxone as instructed (was supposed to stop 3 days preop) because he didn't want to take oxycodone..      ICU Vital Signs Last 24 Hrs  T(C): 36.8 (12 Aug 2024 17:48), Max: 36.8 (12 Aug 2024 17:48)  T(F): 98.2 (12 Aug 2024 17:48), Max: 98.2 (12 Aug 2024 17:48)  HR: 52 (12 Aug 2024 17:20) (52 - 82)  BP: 172/91 (12 Aug 2024 17:00) (121/58 - 240/120)  BP(mean): 122 (12 Aug 2024 17:00) (84 - 157)  ABP: 177/78 (12 Aug 2024 17:20) (94/39 - 177/83)  ABP(mean): 119 (12 Aug 2024 17:20) (61 - 123)  RR: 12 (12 Aug 2024 17:20) (12 - 34)  SpO2: 100% (12 Aug 2024 17:20) (91% - 100%)      08-12-24 @ 07:01  -  08-12-24 @ 18:40  --------------------------------------------------------  IN: 1847 mL / OUT: 835 mL / NET: 1012 mL      PHYSICAL EXAM:  General: Calm  Neuro: AOx3, PERRL, EOMI, facial symmetrical, fluent speech, motor 5/5 throughout, no PND, sensation in tact  CVS: S1/S2, RRR  PULM: Clear, no wheeze  GI: Soft, non- tender  Extremities: No LE edema      MEDICATIONS:   acetaminophen   IVPB .. 1000 milliGRAM(s) IV Intermittent every 8 hours  allopurinol 100 milliGRAM(s) Oral daily  atorvastatin 80 milliGRAM(s) Oral at bedtime  budesonide 160 MICROgram(s)/formoterol 4.5 MICROgram(s) Inhaler 2 Puff(s) Inhalation once  buprenorphine 2 mG/naloxone 0.5 mG SL Film 0.5 Film(s) SubLingual two times a day  ceFAZolin   IVPB 2000 milliGRAM(s) IV Intermittent every 8 hours  chlorhexidine 2% Cloths 1 Application(s) Topical <User Schedule>  dexMEDEtomidine Infusion 0.2 MICROgram(s)/kG/Hr (3.47 mL/Hr) IV Continuous <Continuous>  finasteride 5 milliGRAM(s) Oral daily  insulin lispro (ADMELOG) corrective regimen sliding scale   SubCutaneous every 6 hours  ketamine Infusion 0.5 mG/kG/Hr (17.3 mL/Hr) IV Continuous <Continuous>  methadone    Tablet 5 milliGRAM(s) Oral every 8 hours  methocarbamol 500 milliGRAM(s) Oral every 8 hours  multivitamin 1 Tablet(s) Oral daily  niCARdipine Infusion 5 mG/Hr (25 mL/Hr) IV Continuous <Continuous>  ondansetron Injectable 4 milliGRAM(s) IV Push every 6 hours  polyethylene glycol 3350 17 Gram(s) Oral daily  senna 2 Tablet(s) Oral at bedtime  sodium chloride 0.9%. 1000 milliLiter(s) (70 mL/Hr) IV Continuous <Continuous>  tamsulosin 0.4 milliGRAM(s) Oral at bedtime  zolpidem 5 milliGRAM(s) Oral at bedtime PRN      LABS:                   10.4   6.02  )-----------( 155      ( 12 Aug 2024 12:58 )             32.7     08-12    134<L>  |  104  |  40<H>  ----------------------------<  178<H>  4.5   |  23  |  1.79<H>    Ca    9.5      12 Aug 2024 12:58  Phos  3.1     08-12  Mg     2.1     08-12      ASSESSMENT:   Status post T10-L1 revision of fusion. Plastics closure. POD 0   Hypertension, dyslipidemia, CAD s/p x4 PCI on plavix  History of PE/DVTs,+APLS +Ab previously on Eliquis  BPH, TIIDM (Hgba1c 6.7),  CKD, Nephrolithiasis, Gout, Interstitial Lung Disease  L1-S1 fusion in 2020       PLAN:   Neurochecks Q1h  Standing XRay when able  Drains: Monitor output  Analgesia: Ketamine gtt (wean as tolerated) hydromorphone prn  Sedation: Precedex for agitation RASS  Activity: Bed rest for now  Incentive spirometry, mobilize as tolerated  -150mmHg; d/c a-line in AM- Wean cardene as tolerated  Restart home medications as tolerated  Fluids: IVF until good PO intake  Traumatic rothman insertion 2/2 hx of structure  Diet: Dysphagia screen and then advance diet as tolerated  Bowel regimen standing  Goal euglycemia (-180)  Monitor H/H  VTE prophylaxis. SCDs, Hold chemoprophylaxis due to: fresh post op  Maryjo-op antibiotics; Ancef    Additional critical care time: 45 minutes       NSCU ATTENDING -- ADDITIONAL PROGRESS NOTE    Nighttime rounds were performed     HPI: 85 y/o male with history of HTN, HLD, BPH, T2DM (Hgba1c 6.7), CAD s/p stent~ 4 yrs ago (on Plavix), +antiphospholipid antibody w history of B/L LE DVT was on elliquis (4/2023 dx), CKD, Nephrolithiasis, Gout, Interstitial Lung Disease, L1-S1 fusion in 2020 @[place].   Hx lower back pain radiating to right buttocks. He denies pain radiating to the lower extremities. Currently he can walk for approximately 1 block until the pain becomes debilitating and he has to stop. He also finds himself hunched over when ambulating. 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   He denies any urinary/bowel dysfunction, saddle anesthesia. Today patient also reports that he did not stop the suboxone as instructed (was supposed to stop 3 days preop) because he didn't want to take oxycodone..      ICU Vital Signs Last 24 Hrs  T(C): 36.8 (12 Aug 2024 17:48), Max: 36.8 (12 Aug 2024 17:48)  T(F): 98.2 (12 Aug 2024 17:48), Max: 98.2 (12 Aug 2024 17:48)  HR: 52 (12 Aug 2024 17:20) (52 - 82)  BP: 172/91 (12 Aug 2024 17:00) (121/58 - 240/120)  BP(mean): 122 (12 Aug 2024 17:00) (84 - 157)  ABP: 177/78 (12 Aug 2024 17:20) (94/39 - 177/83)  ABP(mean): 119 (12 Aug 2024 17:20) (61 - 123)  RR: 12 (12 Aug 2024 17:20) (12 - 34)  SpO2: 100% (12 Aug 2024 17:20) (91% - 100%)      08-12-24 @ 07:01  -  08-12-24 @ 18:40  --------------------------------------------------------  IN: 1847 mL / OUT: 835 mL / NET: 1012 mL      PHYSICAL EXAM:  General: Calm  Neuro: AOx3, PERRL, EOMI, facial symmetrical, fluent speech, motor 5/5 throughout, no PND, sensation in tact  CVS: S1/S2, RRR  PULM: Clear, no wheeze  GI: Soft, non- tender  Extremities: No LE edema      MEDICATIONS:   acetaminophen   IVPB .. 1000 milliGRAM(s) IV Intermittent every 8 hours  allopurinol 100 milliGRAM(s) Oral daily  atorvastatin 80 milliGRAM(s) Oral at bedtime  budesonide 160 MICROgram(s)/formoterol 4.5 MICROgram(s) Inhaler 2 Puff(s) Inhalation once  buprenorphine 2 mG/naloxone 0.5 mG SL Film 0.5 Film(s) SubLingual two times a day  ceFAZolin   IVPB 2000 milliGRAM(s) IV Intermittent every 8 hours  chlorhexidine 2% Cloths 1 Application(s) Topical <User Schedule>  dexMEDEtomidine Infusion 0.2 MICROgram(s)/kG/Hr (3.47 mL/Hr) IV Continuous <Continuous>  finasteride 5 milliGRAM(s) Oral daily  insulin lispro (ADMELOG) corrective regimen sliding scale   SubCutaneous every 6 hours  ketamine Infusion 0.5 mG/kG/Hr (17.3 mL/Hr) IV Continuous <Continuous>  methadone    Tablet 5 milliGRAM(s) Oral every 8 hours  methocarbamol 500 milliGRAM(s) Oral every 8 hours  multivitamin 1 Tablet(s) Oral daily  niCARdipine Infusion 5 mG/Hr (25 mL/Hr) IV Continuous <Continuous>  ondansetron Injectable 4 milliGRAM(s) IV Push every 6 hours  polyethylene glycol 3350 17 Gram(s) Oral daily  senna 2 Tablet(s) Oral at bedtime  sodium chloride 0.9%. 1000 milliLiter(s) (70 mL/Hr) IV Continuous <Continuous>  tamsulosin 0.4 milliGRAM(s) Oral at bedtime  zolpidem 5 milliGRAM(s) Oral at bedtime PRN      LABS:                   10.4   6.02  )-----------( 155      ( 12 Aug 2024 12:58 )             32.7     08-12    134<L>  |  104  |  40<H>  ----------------------------<  178<H>  4.5   |  23  |  1.79<H>    Ca    9.5      12 Aug 2024 12:58  Phos  3.1     08-12  Mg     2.1     08-12      ASSESSMENT:   Status post T10-L1 revision of fusion. Plastics closure. POD 0   Hypertension, dyslipidemia, CAD s/p x4 PCI on plavix  History of PE/DVTs,+APLS +Ab previously on Eliquis  BPH, TIIDM (Hgba1c 6.7),  CKD, Nephrolithiasis, Gout, Interstitial Lung Disease  L1-S1 fusion in 2020       PLAN:   Neurochecks Q1h  Standing XRay when able  Drains: Monitor output  Analgesia: Ketamine gtt (wean as tolerated) hydromorphone prn  Sedation: Precedex for agitation RASS  Activity: Bed rest for now  Incentive spirometry, mobilize as tolerated  -150mmHg; d/c a-line in AM- Wean cardene as tolerated  Restart home medications as tolerated  Fluids: IVF until good PO intake  Traumatic rothman insertion 2/2 hx of structure  Diet: Dysphagia screen and then advance diet as tolerated  Bowel regimen standing  Goal euglycemia (-180)  Monitor H/H  VTE prophylaxis. SCDs, Hold chemoprophylaxis due to: fresh post op  Maryjo-op antibiotics; Ancef    Additional critical care time: 45 minutes       NSCU ATTENDING -- ADDITIONAL PROGRESS NOTE    Nighttime rounds were performed     HPI: 85 y/o male with history of HTN, HLD, BPH, T2DM (Hgba1c 6.7), CAD s/p stent~ 4 yrs ago (on Plavix), +antiphospholipid antibody w history of B/L LE DVT was on elliquis (4/2023 dx), CKD, Nephrolithiasis, Gout, Interstitial Lung Disease, L1-S1 fusion in 2020 @[place].   Hx lower back pain radiating to right buttocks. He denies pain radiating to the lower extremities. Currently he can walk for approximately 1 block until the pain becomes debilitating and he has to stop. He also finds himself hunched over when ambulating. 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   He denies any urinary/bowel dysfunction, saddle anesthesia. Today patient also reports that he did not stop the suboxone as instructed (was supposed to stop 3 days preop) because he didn't want to take oxycodone..      ICU Vital Signs Last 24 Hrs  T(C): 36.8 (12 Aug 2024 17:48), Max: 36.8 (12 Aug 2024 17:48)  T(F): 98.2 (12 Aug 2024 17:48), Max: 98.2 (12 Aug 2024 17:48)  HR: 52 (12 Aug 2024 17:20) (52 - 82)  BP: 172/91 (12 Aug 2024 17:00) (121/58 - 240/120)  BP(mean): 122 (12 Aug 2024 17:00) (84 - 157)  ABP: 177/78 (12 Aug 2024 17:20) (94/39 - 177/83)  ABP(mean): 119 (12 Aug 2024 17:20) (61 - 123)  RR: 12 (12 Aug 2024 17:20) (12 - 34)  SpO2: 100% (12 Aug 2024 17:20) (91% - 100%)      08-12-24 @ 07:01  -  08-12-24 @ 18:40  --------------------------------------------------------  IN: 1847 mL / OUT: 835 mL / NET: 1012 mL      PHYSICAL EXAM:  General: Calm  Neuro: Patient somnolent. With noxious stim, becomes arousable with eye opening and oriented x3, PERRL, EOMI,   Power 5/5 throughout, sensation intact  CVS: S1/S2, RRR  PULM: Clear, no wheeze  GI: Soft, non- tender  Extremities: No LE edema, mild chronic erythema of lowers B/L      MEDICATIONS:   acetaminophen   IVPB .. 1000 milliGRAM(s) IV Intermittent every 8 hours  allopurinol 100 milliGRAM(s) Oral daily  atorvastatin 80 milliGRAM(s) Oral at bedtime  budesonide 160 MICROgram(s)/formoterol 4.5 MICROgram(s) Inhaler 2 Puff(s) Inhalation once  buprenorphine 2 mG/naloxone 0.5 mG SL Film 0.5 Film(s) SubLingual two times a day  ceFAZolin   IVPB 2000 milliGRAM(s) IV Intermittent every 8 hours  chlorhexidine 2% Cloths 1 Application(s) Topical <User Schedule>  dexMEDEtomidine Infusion 0.2 MICROgram(s)/kG/Hr (3.47 mL/Hr) IV Continuous <Continuous>  finasteride 5 milliGRAM(s) Oral daily  insulin lispro (ADMELOG) corrective regimen sliding scale   SubCutaneous every 6 hours  ketamine Infusion 0.5 mG/kG/Hr (17.3 mL/Hr) IV Continuous <Continuous>  methadone    Tablet 5 milliGRAM(s) Oral every 8 hours  methocarbamol 500 milliGRAM(s) Oral every 8 hours  multivitamin 1 Tablet(s) Oral daily  niCARdipine Infusion 5 mG/Hr (25 mL/Hr) IV Continuous <Continuous>  ondansetron Injectable 4 milliGRAM(s) IV Push every 6 hours  polyethylene glycol 3350 17 Gram(s) Oral daily  senna 2 Tablet(s) Oral at bedtime  sodium chloride 0.9%. 1000 milliLiter(s) (70 mL/Hr) IV Continuous <Continuous>  tamsulosin 0.4 milliGRAM(s) Oral at bedtime  zolpidem 5 milliGRAM(s) Oral at bedtime PRN      LABS:                   10.4   6.02  )-----------( 155      ( 12 Aug 2024 12:58 )             32.7     08-12    134<L>  |  104  |  40<H>  ----------------------------<  178<H>  4.5   |  23  |  1.79<H>    Ca    9.5      12 Aug 2024 12:58  Phos  3.1     08-12  Mg     2.1     08-12      ASSESSMENT:   Status post T10-L1 revision of fusion. Plastics closure. POD 0   Hypertension, dyslipidemia, CAD s/p x4 PCI on plavix  History of PE/DVTs,+APLS +Ab previously on Eliquis  BPH, TIIDM (Hgba1c 6.7),  CKD, Nephrolithiasis, Gout, Interstitial Lung Disease  L1-S1 fusion in 2020       PLAN:   Neurochecks Q1h  Standing XRay when able  Drains: Monitor output  Analgesia: Ketamine gtt (wean as tolerated) hydromorphone prn  Sedation: Precedex held for now as mental status depression  Activity: Bedrest for now. PT/OT 8/13  Incentive spirometry, mobilize as tolerated  -160mmHg; d/c a-line in AM- Wean cardene.   Restart home medications as tolerated (once can take PO)  Fluids: IVF until good PO intake  Traumatic rothman insertion 2/2 hx of stricture  Monitor Is/Os  Diet: NPO for now pending more wakefulness  Bowel regimen standing  Goal euglycemia (-180). Check TFTs  Monitor H/H  VTE prophylaxis. Check LE dopplers. SCDs. Hold chemoprophylaxis due to: fresh post op  Maryjo-op antibiotics; Ancef    Additional critical care time: 45 minutes

## 2024-08-12 NOTE — H&P ADULT - HISTORY OF PRESENT ILLNESS
83 y/o male with hx HTN, pre- T2DM, CAD s/p stent~ 4 yrs ago (on Plavix), L1-S1 fusion in 2020 @[facility].   Hx lower back pain radiating to right buttocks. He denies pain radiating to the lower extremities. Currently he can walk for approximately 1 block until the pain becomes debilitating and he has to stop. He also finds himself hunched over when ambulating. 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   He denies any urinary/bowel dysfunction, saddle anesthesia.    83 y/o male with hx HTN, HLD, BPH,  T2DM (Hgba1c 6.7), CAD s/p stent~ 4 yrs ago (on Plavix), +antiphospholipid antibody w history of B/L LE DVT was on elliquis (4/2023 dx), CKD, Nephrolithiasis, Gout, Interstitial Lung Disease, L1-S1 fusion in 2020 @[facility].   Hx lower back pain radiating to right buttocks. He denies pain radiating to the lower extremities. Currently he can walk for approximately 1 block until the pain becomes debilitating and he has to stop. He also finds himself hunched over when ambulating. 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   He denies any urinary/bowel dysfunction, saddle anesthesia.    83 y/o male with hx HTN, HLD, BPH,  T2DM (Hgba1c 6.7), CAD s/p stent~ 4 yrs ago (on Plavix), +antiphospholipid antibody w history of B/L LE DVT was on elliquis (4/2023 dx), CKD, Nephrolithiasis, Gout, Interstitial Lung Disease, L1-S1 fusion in 2020 @[facility].   Hx lower back pain radiating to right buttocks. He denies pain radiating to the lower extremities. Currently he can walk for approximately 1 block until the pain becomes debilitating and he has to stop. He also finds himself hunched over when ambulating. 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   He denies any urinary/bowel dysfunction, saddle anesthesia. Today patient also reports that he did not stop the suboxone as instructed (was supposed to stop 3 days preop) because he didn't want to take oxycodone.

## 2024-08-12 NOTE — H&P ADULT - NSICDXPASTMEDICALHX_GEN_ALL_CORE_FT
PAST MEDICAL HISTORY:  Back pain     BPH (benign prostatic hypertrophy)     CAD (coronary artery disease) CARDIAC STENT X1 2019    Chronic kidney disease (CKD)     Chronic pain syndrome     Deep vein thrombosis (DVT) BILAT 2024    Diabetes     Gout     H/O edema     H/O kyphosis THORACOLUMBAR REGION    H/O: depression     HTN (hypertension)     Hyperlipidemia     PAF (paroxysmal atrial fibrillation)     Venous insufficiency

## 2024-08-12 NOTE — H&P ADULT - NSICDXPASTSURGICALHX_GEN_ALL_CORE_FT
PAST SURGICAL HISTORY:  H/O heart artery stent 2019 x1    H/O neck surgery     H/O shoulder surgery RIGHT    H/O total knee replacement BILAT    History of back surgery

## 2024-08-12 NOTE — PROGRESS NOTE ADULT - ASSESSMENT
ASSESSMENT/PLAN:     s/p T10-L1 revision of fusion. Plastics closure    NEURO:  - Neurochecks q1h  - standing xr when able  - DEEP HMV, superficial EZEKIEL  - pain management following, ketamine gtt, hydromorphone prn; wean off drip as tolerated  - Pain control  - Activity: bed rest for now  - precedex for agitation, wean as tolerated, pain amangement    PULM:  - Incentive spirometry   - mobilize as tolerated  - Aspiration Precautions    CV:  Hypertension, dyslipidemia, CAD s/p x4 pci on plavix  - -150mmHg  - d/c a-line in AM  - restart home medications as tolerated  - cardene prn to goal    RENAL:  - Fluids: IVF until good PO intake  - trend renal function  - pulled out rothman when agitated, tov now; has some hamturia likely traumatic rothman insertion 2/ hx of structure    GI:  - Diet: Dysphagia screen and then advance diet as tolerated  - Bowel regimen standing    ENDO:   - Goal euglycemia (-180)    HEME/ONC:  hx of PE/DVTs, +APLS +Ab  - monitor H/H  - previously on eliquis    VTE prophylaxis   - SCDs   - hold chemoprophylaxis due to: fresh post op      ID:  - Maryjo-op antibiotics

## 2024-08-12 NOTE — PROCEDURE NOTE - NSPROCDETAILS_GEN_ALL_CORE
location identified, draped/prepped, sterile technique used
blood seen on insertion/dressing applied/flushes easily/secured in place

## 2024-08-12 NOTE — PRE-ANESTHESIA EVALUATION ADULT - NSANTHOSAYNRD_GEN_A_CORE
No. EVE screening performed.  STOP BANG Legend: 0-2 = LOW Risk; 3-4 = INTERMEDIATE Risk; 5-8 = HIGH Risk

## 2024-08-12 NOTE — PRE-ANESTHESIA EVALUATION ADULT - NSANTHADDINFOFT_GEN_ALL_CORE
Pt continued suboxone despite being told by pain doctor to discontinue 3 days prior. Pt will go to ICU post op and will be followed by inpatient pain service there

## 2024-08-12 NOTE — PROGRESS NOTE ADULT - SUBJECTIVE AND OBJECTIVE BOX
NSCU Progress Note    Assessment/Hospital Course:    83 y/o male with hx HTN, HLD, BPH,  T2DM (Hgba1c 6.7), CAD s/p stent~ 4 yrs ago (on Plavix), +antiphospholipid antibody w history of B/L LE DVT was on elliquis (4/2023 dx), CKD, Nephrolithiasis, Gout, Interstitial Lung Disease, L1-S1 fusion in 2020 @[facility].   Hx lower back pain radiating to right buttocks. He denies pain radiating to the lower extremities. Currently he can walk for approximately 1 block until the pain becomes debilitating and he has to stop. He also finds himself hunched over when ambulating. 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   He denies any urinary/bowel dysfunction, saddle anesthesia. Today patient also reports that he did not stop the suboxone as instructed (was supposed to stop 3 days preop) because he didn't want to take oxycodone..      24 Hour Events/Subjective:  - POD0 s/p T10-L1 revision of fusion. Plastics closure      REVIEW OF SYSTEMS:  - negative except as above    VITALS:   - reviewed      IMAGING/DATA:   - Reviewed          PHYSICAL EXAM:    General: calm  CVS: RRR  Pulm: CTAB  GI: Soft, NTND  Extremities: No LE Edema  Neuro: AOx3, PERRL, EOMI, facial symmetrical, fluent speech, motor 5/5 throughout, no PND, sensation in tact

## 2024-08-12 NOTE — H&P ADULT - ASSESSMENT
The patient is an 84-year-old gentleman with a history of multiple prior lumbar surgeries who presents for evaluation of severe, worsening low back pain. He states that he has had this pain since his previous operation. He denies any radiation into the lower extremities but notes severe pain in the right buttocks. The patient has both CT and MRI of his thoracic and lumbar spine available for review. 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 MRI shows a fluid collection within the disc at T12-L1 that may be due to hypermobility versus indolent infection. Here today for extension of fusion to T10.. 83 y/o male with hx HTN, HLD, BPH,  T2DM (Hgba1c 6.7), CAD s/p stent~ 4 yrs ago (on Plavix), +antiphospholipid antibody w history of B/L LE DVT was on elliquis (4/2023 dx), CKD, Nephrolithiasis, Gout, Interstitial Lung Disease, L1-S1 fusion in 2020 @[facility], with a history of multiple prior lumbar surgeries who presents for evaluation of severe, worsening low back pain. He states that he has had this pain since his previous operation. He denies any radiation into the lower extremities but notes severe pain in the right buttocks. The patient has both CT and MRI of his thoracic and lumbar spine available for review. 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 MRI shows a fluid collection within the disc at T12-L1 that may be due to hypermobility versus indolent infection. Here today for extension of fusion to T10..    - NPO  - 3M  - Perioperative antibiotics  - SCDs: has history of B/L LE DVT s/p treatment with elliquis (4/2023 DVT's), plan for admission dopplers today  - Pain management consult: Suboxone was not stopped preop, unable to give NSAIDs d/t CKD, plan for admission to ICU postop and ketamine gtt, awaiting further recs from pain management.  (Dr. Hernandez informed the patient today that he will likely have severe pain postop that may be difficult to manage and that we can reschedule this elective case for when the suboxone is stopped for 3 days preop.  The patient voiced understanding and wanted to proceed with surgery today.)    All above d/w Dr. Hernandez who formed above plan.

## 2024-08-13 LAB
A1C WITH ESTIMATED AVERAGE GLUCOSE RESULT: 6.1 % — HIGH (ref 4–5.6)
ANION GAP SERPL CALC-SCNC: 9 MMOL/L — SIGNIFICANT CHANGE UP (ref 5–17)
BUN SERPL-MCNC: 41 MG/DL — HIGH (ref 7–23)
CALCIUM SERPL-MCNC: 9.6 MG/DL — SIGNIFICANT CHANGE UP (ref 8.4–10.5)
CHLORIDE SERPL-SCNC: 109 MMOL/L — HIGH (ref 96–108)
CO2 SERPL-SCNC: 21 MMOL/L — LOW (ref 22–31)
CREAT SERPL-MCNC: 1.79 MG/DL — HIGH (ref 0.5–1.3)
EGFR: 36 ML/MIN/1.73M2 — LOW
ESTIMATED AVERAGE GLUCOSE: 128 MG/DL — HIGH (ref 68–114)
GLUCOSE BLDC GLUCOMTR-MCNC: 103 MG/DL — HIGH (ref 70–99)
GLUCOSE BLDC GLUCOMTR-MCNC: 126 MG/DL — HIGH (ref 70–99)
GLUCOSE SERPL-MCNC: 113 MG/DL — HIGH (ref 70–99)
HCT VFR BLD CALC: 31.5 % — LOW (ref 39–50)
HGB BLD-MCNC: 10.2 G/DL — LOW (ref 13–17)
MAGNESIUM SERPL-MCNC: 2.1 MG/DL — SIGNIFICANT CHANGE UP (ref 1.6–2.6)
MCHC RBC-ENTMCNC: 27.2 PG — SIGNIFICANT CHANGE UP (ref 27–34)
MCHC RBC-ENTMCNC: 32.4 GM/DL — SIGNIFICANT CHANGE UP (ref 32–36)
MCV RBC AUTO: 84 FL — SIGNIFICANT CHANGE UP (ref 80–100)
NRBC # BLD: 0 /100 WBCS — SIGNIFICANT CHANGE UP (ref 0–0)
PHOSPHATE SERPL-MCNC: 4.1 MG/DL — SIGNIFICANT CHANGE UP (ref 2.5–4.5)
PLATELET # BLD AUTO: 172 K/UL — SIGNIFICANT CHANGE UP (ref 150–400)
POTASSIUM SERPL-MCNC: 4.6 MMOL/L — SIGNIFICANT CHANGE UP (ref 3.5–5.3)
POTASSIUM SERPL-SCNC: 4.6 MMOL/L — SIGNIFICANT CHANGE UP (ref 3.5–5.3)
RBC # BLD: 3.75 M/UL — LOW (ref 4.2–5.8)
RBC # FLD: 16.3 % — HIGH (ref 10.3–14.5)
SODIUM SERPL-SCNC: 139 MMOL/L — SIGNIFICANT CHANGE UP (ref 135–145)
TROPONIN T, HIGH SENSITIVITY RESULT: 59 NG/L — CRITICAL HIGH (ref 0–51)
TSH SERPL-MCNC: 0.52 UIU/ML — SIGNIFICANT CHANGE UP (ref 0.27–4.2)
WBC # BLD: 10.62 K/UL — HIGH (ref 3.8–10.5)
WBC # FLD AUTO: 10.62 K/UL — HIGH (ref 3.8–10.5)

## 2024-08-13 PROCEDURE — 99231 SBSQ HOSP IP/OBS SF/LOW 25: CPT

## 2024-08-13 PROCEDURE — 71045 X-RAY EXAM CHEST 1 VIEW: CPT | Mod: 26

## 2024-08-13 PROCEDURE — 99221 1ST HOSP IP/OBS SF/LOW 40: CPT

## 2024-08-13 PROCEDURE — 99233 SBSQ HOSP IP/OBS HIGH 50: CPT

## 2024-08-13 RX ORDER — GABAPENTIN 100 MG
100 CAPSULE ORAL THREE TIMES A DAY
Refills: 0 | Status: DISCONTINUED | OUTPATIENT
Start: 2024-08-13 | End: 2024-08-15

## 2024-08-13 RX ORDER — HYDROMORPHONE HYDROCHLORIDE 2 MG/1
30 TABLET ORAL
Refills: 0 | Status: DISCONTINUED | OUTPATIENT
Start: 2024-08-13 | End: 2024-08-13

## 2024-08-13 RX ORDER — HYDROMORPHONE HYDROCHLORIDE 2 MG/1
0.5 TABLET ORAL ONCE
Refills: 0 | Status: DISCONTINUED | OUTPATIENT
Start: 2024-08-13 | End: 2024-08-13

## 2024-08-13 RX ORDER — HEPARIN SODIUM,BOVINE 1000/ML
5000 VIAL (ML) INJECTION EVERY 8 HOURS
Refills: 0 | Status: DISCONTINUED | OUTPATIENT
Start: 2024-08-13 | End: 2024-08-20

## 2024-08-13 RX ORDER — OXYCODONE HYDROCHLORIDE 5 MG/1
10 TABLET ORAL EVERY 4 HOURS
Refills: 0 | Status: DISCONTINUED | OUTPATIENT
Start: 2024-08-13 | End: 2024-08-14

## 2024-08-13 RX ORDER — ONDANSETRON 2 MG/ML
4 INJECTION, SOLUTION INTRAMUSCULAR; INTRAVENOUS EVERY 6 HOURS
Refills: 0 | Status: DISCONTINUED | OUTPATIENT
Start: 2024-08-13 | End: 2024-08-13

## 2024-08-13 RX ORDER — BENZOCAINE/MENTHOL 20 %-0.5 %
1 AEROSOL (GRAM) TOPICAL
Refills: 0 | Status: DISCONTINUED | OUTPATIENT
Start: 2024-08-13 | End: 2024-08-20

## 2024-08-13 RX ORDER — SODIUM CHLORIDE 9 MG/ML
500 INJECTION INTRAMUSCULAR; INTRAVENOUS; SUBCUTANEOUS ONCE
Refills: 0 | Status: COMPLETED | OUTPATIENT
Start: 2024-08-13 | End: 2024-08-13

## 2024-08-13 RX ORDER — HYDROMORPHONE HYDROCHLORIDE 2 MG/1
1 TABLET ORAL ONCE
Refills: 0 | Status: DISCONTINUED | OUTPATIENT
Start: 2024-08-13 | End: 2024-08-14

## 2024-08-13 RX ORDER — METHADONE HYDROCHLORIDE 1 G/G
2.5 POWDER ORAL EVERY 8 HOURS
Refills: 0 | Status: DISCONTINUED | OUTPATIENT
Start: 2024-08-13 | End: 2024-08-14

## 2024-08-13 RX ORDER — OXYCODONE HYDROCHLORIDE 5 MG/1
15 TABLET ORAL EVERY 4 HOURS
Refills: 0 | Status: DISCONTINUED | OUTPATIENT
Start: 2024-08-13 | End: 2024-08-14

## 2024-08-13 RX ORDER — ZOLPIDEM TARTRATE 5 MG/1
5 TABLET, FILM COATED ORAL AT BEDTIME
Refills: 0 | Status: DISCONTINUED | OUTPATIENT
Start: 2024-08-13 | End: 2024-08-14

## 2024-08-13 RX ORDER — HYDROMORPHONE HYDROCHLORIDE 2 MG/1
0.5 TABLET ORAL EVERY 4 HOURS
Refills: 0 | Status: DISCONTINUED | OUTPATIENT
Start: 2024-08-13 | End: 2024-08-13

## 2024-08-13 RX ORDER — LIDOCAINE/BENZALKONIUM/ALCOHOL
1 SOLUTION, NON-ORAL TOPICAL EVERY 24 HOURS
Refills: 0 | Status: DISCONTINUED | OUTPATIENT
Start: 2024-08-13 | End: 2024-08-20

## 2024-08-13 RX ORDER — ALPRAZOLAM 0.25 MG
0.5 TABLET ORAL ONCE
Refills: 0 | Status: DISCONTINUED | OUTPATIENT
Start: 2024-08-13 | End: 2024-08-13

## 2024-08-13 RX ADMIN — ACETAMINOPHEN 1000 MILLIGRAM(S): 325 TABLET ORAL at 09:52

## 2024-08-13 RX ADMIN — Medication 1 TABLET(S): at 13:37

## 2024-08-13 RX ADMIN — ZOLPIDEM TARTRATE 5 MILLIGRAM(S): 5 TABLET, FILM COATED ORAL at 21:25

## 2024-08-13 RX ADMIN — ACETAMINOPHEN 400 MILLIGRAM(S): 325 TABLET ORAL at 01:19

## 2024-08-13 RX ADMIN — HYDROMORPHONE HYDROCHLORIDE 0.5 MILLIGRAM(S): 2 TABLET ORAL at 05:30

## 2024-08-13 RX ADMIN — Medication 100 MILLIGRAM(S): at 21:27

## 2024-08-13 RX ADMIN — METHOCARBAMOL 500 MILLIGRAM(S): 750 TABLET, FILM COATED ORAL at 22:56

## 2024-08-13 RX ADMIN — ACETAMINOPHEN 400 MILLIGRAM(S): 325 TABLET ORAL at 09:37

## 2024-08-13 RX ADMIN — Medication 1 PATCH: at 23:03

## 2024-08-13 RX ADMIN — HYDROMORPHONE HYDROCHLORIDE 30 MILLILITER(S): 2 TABLET ORAL at 10:19

## 2024-08-13 RX ADMIN — Medication 100 MILLIGRAM(S): at 13:37

## 2024-08-13 RX ADMIN — Medication 5000 UNIT(S): at 22:55

## 2024-08-13 RX ADMIN — Medication 1 LOZENGE: at 23:19

## 2024-08-13 RX ADMIN — HYDROMORPHONE HYDROCHLORIDE 0.5 MILLIGRAM(S): 2 TABLET ORAL at 03:17

## 2024-08-13 RX ADMIN — BUPRENORPHINE AND NALOXONE 0.5 FILM(S): 8; 2 FILM BUCCAL; SUBLINGUAL at 07:24

## 2024-08-13 RX ADMIN — ACETAMINOPHEN 1000 MILLIGRAM(S): 325 TABLET ORAL at 01:35

## 2024-08-13 RX ADMIN — SODIUM CHLORIDE 500 MILLILITER(S): 9 INJECTION INTRAMUSCULAR; INTRAVENOUS; SUBCUTANEOUS at 06:50

## 2024-08-13 RX ADMIN — HYDROMORPHONE HYDROCHLORIDE 0.5 MILLIGRAM(S): 2 TABLET ORAL at 11:07

## 2024-08-13 RX ADMIN — Medication 2: at 00:02

## 2024-08-13 RX ADMIN — OXYCODONE HYDROCHLORIDE 15 MILLIGRAM(S): 5 TABLET ORAL at 15:40

## 2024-08-13 RX ADMIN — OXYCODONE HYDROCHLORIDE 15 MILLIGRAM(S): 5 TABLET ORAL at 21:23

## 2024-08-13 RX ADMIN — METHOCARBAMOL 500 MILLIGRAM(S): 750 TABLET, FILM COATED ORAL at 13:37

## 2024-08-13 RX ADMIN — METHADONE HYDROCHLORIDE 5 MILLIGRAM(S): 1 POWDER ORAL at 06:54

## 2024-08-13 RX ADMIN — OXYCODONE HYDROCHLORIDE 15 MILLIGRAM(S): 5 TABLET ORAL at 22:30

## 2024-08-13 RX ADMIN — CHLORHEXIDINE GLUCONATE 1 APPLICATION(S): 40 SOLUTION TOPICAL at 07:46

## 2024-08-13 RX ADMIN — POLYETHYLENE GLYCOL 3350 17 GRAM(S): 17 POWDER, FOR SOLUTION ORAL at 13:38

## 2024-08-13 RX ADMIN — TAMSULOSIN HYDROCHLORIDE 0.4 MILLIGRAM(S): 0.4 CAPSULE ORAL at 23:41

## 2024-08-13 RX ADMIN — HYDROMORPHONE HYDROCHLORIDE 0.5 MILLIGRAM(S): 2 TABLET ORAL at 05:15

## 2024-08-13 RX ADMIN — METHOCARBAMOL 500 MILLIGRAM(S): 750 TABLET, FILM COATED ORAL at 06:54

## 2024-08-13 RX ADMIN — CEFAZOLIN SODIUM 100 MILLIGRAM(S): 2 INJECTION, SOLUTION INTRAVENOUS at 02:20

## 2024-08-13 RX ADMIN — FINASTERIDE 5 MILLIGRAM(S): 1 TABLET, FILM COATED ORAL at 13:37

## 2024-08-13 RX ADMIN — Medication 2 TABLET(S): at 22:56

## 2024-08-13 RX ADMIN — Medication 0.5 MILLIGRAM(S): at 23:19

## 2024-08-13 RX ADMIN — METHADONE HYDROCHLORIDE 5 MILLIGRAM(S): 1 POWDER ORAL at 13:37

## 2024-08-13 RX ADMIN — Medication 80 MILLIGRAM(S): at 22:56

## 2024-08-13 RX ADMIN — OXYCODONE HYDROCHLORIDE 15 MILLIGRAM(S): 5 TABLET ORAL at 16:10

## 2024-08-13 RX ADMIN — METHADONE HYDROCHLORIDE 2.5 MILLIGRAM(S): 1 POWDER ORAL at 22:55

## 2024-08-13 RX ADMIN — SODIUM CHLORIDE 70 MILLILITER(S): 9 INJECTION INTRAMUSCULAR; INTRAVENOUS; SUBCUTANEOUS at 05:20

## 2024-08-13 NOTE — BH CONSULTATION LIAISON ASSESSMENT NOTE - HPI (INCLUDE ILLNESS QUALITY, SEVERITY, DURATION, TIMING, CONTEXT, MODIFYING FACTORS, ASSOCIATED SIGNS AND SYMPTOMS)
84 year old man, semi-retired from work in reality, , domiciled with wife in [place], PMH history of HTN, HLD, BPH,  T2DM (Hgba1c 6.7), CAD s/p stent~ 4 yrs ago (on Plavix), +antiphospholipid antibody w history of B/L LE DVT was on elliquis (4/2023 dx), CKD, Nephrolithiasis, Gout, Interstitial Lung Disease, L1-S1 fusion in 2020, no psychiatric history or past psychiatric hospitalizations, admitted to the hospital following T10-L2 spinal fusion revision and presenting for evaluation of his depressive symptoms. He underwent a spinal fusion surgery 4 years ago an experienced substantial and ongoing pain following that surgery which he stated was “screwed up”. He was placed on escalating doses of oxycodone reaching as high as 45 mg every 4 hours. Sometime in the past year he noted the onset of depressive symptoms with low mood most of the day every day, reduced enjoyment of activities he had previously enjoyed, reduced appetite with resultant 80 lbs weight loss, insomnia which he has treated with zolpidem, fatigue, psychomotor slowing, and reduced concentration. He denies feelings of guilt or worthlessness and denies suicidal ideation. He believes these symptoms have diminished his ability to function but cannot confirm exactly how much of his decreased functioning is due to his psychiatric symptoms as opposed to his other medical issues and chronic pain.   3 weeks ago he was informed by his friends and his wife that he had been acting differently on the elevated dosage, being non-communicative and active abusively. He discontinued the oxycodone and switched over to suboxone at an elevated dose which was lowered due to hypersomnia.    He has no past history of psychiatric treatment or diagnosis. He has never seen a therapist other than a marriage counselor. He denies any family history of mental disorder.           Was accompanied by two friends who were substantially younger than him. When asked none of them volunteered how they knew eachother just stated they were longtime friends. They stated he     84 year old man, semi-retired from work in reality, , domiciled with wife in [place], PMH history of HTN, HLD, BPH,  T2DM (Hgba1c 6.7), CAD s/p stent~ 4 yrs ago (on Plavix), +antiphospholipid antibody w history of B/L LE DVT was on elliquis (4/2023 dx), CKD, Nephrolithiasis, Gout, Interstitial Lung Disease, L1-S1 fusion in 2020, no psychiatric history or past psychiatric hospitalizations other than use of zolpidem for insomnia, admitted to the hospital following T10-L2 spinal fusion revision and presenting for evaluation of his depressive symptoms. He underwent a spinal fusion surgery 4 years ago an experienced substantial and ongoing pain following that surgery which he stated was “screwed up”. He was placed on escalating doses of oxycodone reaching as high as 45 mg every 4 hours. Sometime in the past year he noted the onset of depressive symptoms with low mood most of the day every day, reduced enjoyment of activities he had previously enjoyed, reduced appetite with resultant 80 lbs weight loss, insomnia which he has treated with zolpidem, fatigue, psychomotor slowing, and reduced concentration. He denies feelings of guilt or worthlessness and denies suicidal ideation. He believes these symptoms have diminished his ability to function but cannot confirm exactly how much of his decreased functioning is due to his psychiatric symptoms as opposed to his other medical issues and chronic pain.   3 weeks ago he was informed by his friends and his wife that he had been acting differently on the elevated dosage, being non-communicative and active abusively. He discontinued the oxycodone and switched over to suboxone at an elevated dose which was lowered due to hypersomnia. He has continued this suboxone despite being told to discontinue prior to his surgery so he could fully benefit from opioid analgesia. Patient is currently taking zolpidem for insomnia which he states does increase the amount of time he sleeps.    He has no past history of psychiatric treatment or diagnosis. He has never seen a therapist other than a marriage counselor. He denies any family history of mental disorder. He notes being estranged from his children for the past two years and having a difficult relationship with his wife who he described as emotionally non-supportive. He states his health has been a major stressor. Throughout interview he was occupied with pain and stated he felt hospital staff were often not sufficiently empathetic. He reported chest pain and shortness of breath which was reported to primary care team and which the patient stated he had described to the team previously.    Patient was accompanied by two friends who he requested be present for the interview. When asked none, of them volunteered how they knew each other and just stated they were longtime friends. They stated his memory was worse during interview than normal as demonstrate by cognitive tasks.

## 2024-08-13 NOTE — OCCUPATIONAL THERAPY INITIAL EVALUATION ADULT - ADDITIONAL COMMENTS
Pt. resides with his wife in a private home w. 2 FOS inside. Pt. was I prior in ADLs and functional mob/transfers with use of SC. BR with walk in shower. He is R handed

## 2024-08-13 NOTE — BH CONSULTATION LIAISON ASSESSMENT NOTE - CURRENT MEDICATION
MEDICATIONS  (STANDING):  allopurinol 100 milliGRAM(s) Oral daily  atorvastatin 80 milliGRAM(s) Oral at bedtime  budesonide 160 MICROgram(s)/formoterol 4.5 MICROgram(s) Inhaler 2 Puff(s) Inhalation once  chlorhexidine 2% Cloths 1 Application(s) Topical <User Schedule>  finasteride 5 milliGRAM(s) Oral daily  heparin   Injectable 5000 Unit(s) SubCutaneous every 8 hours  HYDROmorphone PCA (1 mG/mL) 30 milliLiter(s) PCA Continuous PCA Continuous  insulin lispro (ADMELOG) corrective regimen sliding scale   SubCutaneous every 6 hours  methadone    Tablet 5 milliGRAM(s) Oral every 8 hours  methocarbamol 500 milliGRAM(s) Oral every 8 hours  multivitamin 1 Tablet(s) Oral daily  polyethylene glycol 3350 17 Gram(s) Oral daily  senna 2 Tablet(s) Oral at bedtime  tamsulosin 0.4 milliGRAM(s) Oral at bedtime    MEDICATIONS  (PRN):  atropine Injectable 1 milliGRAM(s) IntraMuscular once PRN Sustianed bradycardia HR less than 35 beats/min  naloxone Injectable 0.4 milliGRAM(s) IV Push once PRN Signs of opioid overdose  ondansetron Injectable 4 milliGRAM(s) IV Push every 6 hours PRN Nausea  zolpidem 5 milliGRAM(s) Oral at bedtime PRN Insomnia

## 2024-08-13 NOTE — PHYSICAL THERAPY INITIAL EVALUATION ADULT - ADDITIONAL COMMENTS
As per pt, PTA he was mostly independent with functional mobility, ADLs, and IADLs with use of straight cane. Has 2 flights of stairs. Pt has a walk in shower, will be moving in 1 month to a home with steps and a shower tub. Pt is R handed.

## 2024-08-13 NOTE — PROGRESS NOTE ADULT - ASSESSMENT
ASSESSMENT/PLAN:     s/p T10-L1 revision of fusion. Plastics closure    NEURO:  - Neurochecks q4h  - standing xr when able  - DEEP HMV, superficial EZEKIEL  - pain management following  - Pain control  - Activity: oobc, pt/ot      PULM:  - Incentive spirometry   - mobilize as tolerated  - Aspiration Precautions    CV:  Hypertension, dyslipidemia, CAD s/p x4 pci on plavix  - -150mmHg  - d/c a-line  - restart home medications as tolerated  - cardene prn to goal    RENAL:  - Fluids: IVl  - trend renal function  - tov    GI:  - Diet: Dysphagia screen and then advance diet as tolerated  - Bowel regimen standing    ENDO:   - Goal euglycemia (-180)    HEME/ONC:  hx of PE/DVTs, +APLS +Ab  - monitor H/H  - previously on eliquis    VTE prophylaxis   - SCDs   - hold chemoprophylaxis due to: sql      ID:  - Maryjo-op antibiotics

## 2024-08-13 NOTE — BH CONSULTATION LIAISON ASSESSMENT NOTE - RISK ASSESSMENT
Patient reports no suicidal ideation, has social support network. Low acute risk. Patient reports no suicidal ideation, has social support network.

## 2024-08-13 NOTE — OCCUPATIONAL THERAPY INITIAL EVALUATION ADULT - PERTINENT HX OF CURRENT PROBLEM, REHAB EVAL
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. Hx lower back pain radiating to right buttocks. He denies pain radiating to the lower extremities. Currently he can walk for approximately 1 block until the pain becomes debilitating and he has to stop. He also finds himself hunched over when ambulating. 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

## 2024-08-13 NOTE — BH CONSULTATION LIAISON ASSESSMENT NOTE - NSBHATTESTCOMMENTATTENDFT_PSY_A_CORE
Patient is an 83 y/o male with no PPH, history of dependency of pain medications, currently on suboxone and PMH of HTN, HLD, BPH, T2DM (Hgba1c 6.7), CAD s/p stent~ 4 yrs ago (on Plavix), +antiphospholipid antibody w history of B/L LE DVT was on elliquis (4/2023 dx), CKD, Nephrolithiasis, Gout, Interstitial Lung Disease, L1-S1 fusion in 2020 @[facility], now s/p spine fusion revision. Psychiatry was consulted at the request of the patient for an evaluation for depression/mood sx. Patient reports low mood in context of limited pain control for several week prior to admission and post surgery (primary team and pain service currently addressing patient's postop pain). No SI/HI/AVH/PI. He endorses insomnia (takes zolpidem qhs). He presents with impaired recall which according to family/friends it is not a baseline. DDx Adjustment disorder r/o delirium, r/o mdd. 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 Psychiatry was consulted at the request of the patient for an evaluation for depression/mood sx. Patient reports low mood in context of limited pain control for several week prior to admission and post surgery (primary team and pain service currently addressing patient's postop pain). No SI/HI/AVH/PI. He endorses insomnia (takes zolpidem qhs). He presents with impaired recall which according to family/friends it is not a baseline. DDx Adjustment disorder r/o delirium, r/o mdd. Plan:- no current indication for 1:1 or inpatient psychiatric treatment; -will continue to follow and re-assess patient's mood sx after his pain is better controlled; -consider stopping zolpidem and adding mirtazapine 15mg qhs for insomnia/depression (pt at risk of delirium due to postop status and current tx with opiates); -CL to follow as needed, please call with questions/concerns

## 2024-08-13 NOTE — PHYSICAL THERAPY INITIAL EVALUATION ADULT - PERTINENT HX OF CURRENT PROBLEM, REHAB EVAL
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 Hx lower back pain radiating to right buttocks. He denies pain radiating to the lower extremities. Currently he can walk for approximately 1 block until the pain becomes debilitating and he has to stop. He also finds himself hunched over when ambulating. 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

## 2024-08-13 NOTE — PHYSICAL THERAPY INITIAL EVALUATION ADULT - GAIT DEVIATIONS NOTED, PT EVAL
requires VC for sequencing and direction with use of RW/decreased keira/decreased velocity of limb motion/decreased step length/decreased weight-shifting ability

## 2024-08-13 NOTE — BH CONSULTATION LIAISON ASSESSMENT NOTE - VETERAN
Reason for visit: stress incontinence     Relevant information: S/p cysto with periurethral bulking injections by Dr. Shaikh on 10/9/23, some pressure along pelvic floor. 11/24 PFD, fecal incontinence, urinary incontinence, neurogenic bladder. Lost all control of bowel and urine completely after spinal surgery in May. Prior to surgery had very minimal fecal incontinence.    Records/imaging/labs/orders: all records available    Pt called: no need for a call    At Rooming: have pt empty bladder/pvr, Document PVR      De Garcia  2/19/2024  2:24 PM   No

## 2024-08-13 NOTE — BH CONSULTATION LIAISON ASSESSMENT NOTE - NSBHCHARTREVIEWVS_PSY_A_CORE FT
Vital Signs Last 24 Hrs  T(C): 37 (13 Aug 2024 09:03), Max: 37 (13 Aug 2024 09:03)  T(F): 98.6 (13 Aug 2024 09:03), Max: 98.6 (13 Aug 2024 09:03)  HR: 62 (13 Aug 2024 11:00) (40 - 82)  BP: 99/54 (13 Aug 2024 11:00) (99/54 - 198/100)  BP(mean): 73 (13 Aug 2024 11:00) (73 - 140)  RR: 24 (13 Aug 2024 11:00) (11 - 27)  SpO2: 100% (13 Aug 2024 11:00) (93% - 100%)    Parameters below as of 13 Aug 2024 10:06  Patient On (Oxygen Delivery Method): room air

## 2024-08-13 NOTE — OCCUPATIONAL THERAPY INITIAL EVALUATION ADULT - MODIFIED CLINICAL TEST OF SENSORY INTEGRATION IN BALANCE TEST
Pt. performed functional mob from bed to chair w. Min Ax1 and RW noted unsteady with hunched over posture 2/2 compensating for pain, responds well to verbal cuing for sequencing

## 2024-08-13 NOTE — OCCUPATIONAL THERAPY INITIAL EVALUATION ADULT - GENERAL OBSERVATIONS, REHAB EVAL
OT IE complete. DIPIKA Zamora clearing pt. for session. Pt. received semi-supine in bed, +EZEKIEL, +HV, +tele, +spinal incision C/D/I, + rothman, anxious and having pain but agreeable to session. PT Kasey present.

## 2024-08-13 NOTE — PROGRESS NOTE ADULT - SUBJECTIVE AND OBJECTIVE BOX
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   Hx lower back pain radiating to right buttocks. He denies pain radiating to the lower extremities. Currently he can walk for approximately 1 block until the pain becomes debilitating and he has to stop. He also finds himself hunched over when ambulating. 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   He denies any urinary/bowel dysfunction, saddle anesthesia. Today patient also reports that he did not stop the suboxone as instructed (was supposed to stop 3 days preop) because he didn't want to take oxycodone..      24 Hour Events/Subjective:  - POD1 s/p T10-L1 revision of fusion. Plastics closure  - off ketamine/precedex    REVIEW OF SYSTEMS:  - negative except as above    VITALS:   - reviewed      IMAGING/DATA:   - Reviewed          PHYSICAL EXAM:    General: calm  CVS: RRR  Pulm: CTAB  GI: Soft, NTND  Extremities: No LE Edema  Neuro: AOx3, PERRL, EOMI, facial symmetrical, fluent speech, motor 5/5 throughout, no PND, sensation in tact

## 2024-08-13 NOTE — BH CONSULTATION LIAISON ASSESSMENT NOTE - SUMMARY
84 year old man, semi-retired from work in reality, , domiciled with wife in [place], PMH history of HTN, HLD, BPH,  T2DM (Hgba1c 6.7), CAD s/p stent~ 4 yrs ago (on Plavix), +antiphospholipid antibody w history of B/L LE DVT was on elliquis (4/2023 dx), CKD, Nephrolithiasis, Gout, Interstitial Lung Disease, L1-S1 fusion in 2020, no psychiatric history or past psychiatric hospitalizations, admitted to the hospital following T10-L2 spinal fusion revision and presenting for evaluation of his depressive symptoms. He does report multiple depression symptoms but throughout interview his timeframe for each symptom and the occurrence of key life events seemed to alter. He also demonstrated deficits in short-term recall and long-term recall that his friends who he requested be present for the interview noted were unusual for him. His pain and high dose of opioids required to have any effect of pain may have lead to transient cognitive dysfunction. Patient may benefit from reevaluation once pain is more adequately controlled to confirm course of illness symptoms. Delirium 2/2 recent surgery and ICU environment may also be contributing ot current symptoms and cognitive impairment. Discontinuation of zolpidem and trial of more effective, less habit forming medication for insomnia may be beneficial.    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 He does report multiple depression symptoms but throughout interview his timeframe for each symptom and the occurrence of key life events seemed to alter. He also demonstrated deficits in short-term recall and long-term recall that his friends who he requested be present for the interview noted were unusual for him. His pain and high dose of opioids required to have any effect of pain may have lead to transient cognitive dysfunction. Patient may benefit from reevaluation once pain is more adequately controlled to confirm course of illness symptoms. Delirium 2/2 recent surgery and ICU environment may also be contributing ot current symptoms and cognitive impairment. Discontinuation of zolpidem and trial of more effective, less habit forming medication for insomnia may be beneficial.    Plan:   -reevaluate once pain is under better control to determine timetable of symptoms  -Refer to outpatient psychiatry and therapy  -Discontinue zolpidem and attempt trial of mirtazepine 15mg qhs for insomnia and depression or trazodone 50mg     - Delirium Precautions:  Orientation protocols: Provision of clocks, calendars, windows with outside views and frequent verbal reorientation of patients.  Avoiding and/or monitoring the use of problematic medications: Avoid benzodiazepines. Use caution when prescribing opioids, dihydropyridines, anticholinergics, antihistamines.  Cognitive stimulation: Regular visits from family and friends. Avoid overstimulation (particularly at night)Facilitation of physiologic sleep: Nursing and medical procedures, including the administration ofmedications, should be avoided during sleeping hours when possible. Night-time noise should be reduced.  Early mobilization and minimized use of physical restraints for patients with limited mobility.   Visual and hearing aids for patients with these impairments

## 2024-08-13 NOTE — PROGRESS NOTE ADULT - SUBJECTIVE AND OBJECTIVE BOX
S/Overnight events: POD 1 revision T10-L2 fusion, T2/L1 lami. TRANG overnight.    Hospital Course:   8/12: POD 0 s/p revision T10-L2 fusion, laminectomy T2/L1. DC precedex for bradycardia to 40s. Zofran DC'd for borderline QTc. Atropine @ bedside for 1st deg heart block. Warmed IVF, bearhugger for hypothermia 90F w/ improvement. DC'd ketamine gtt for somnolence.     Vital Signs Last 24 Hrs  T(C): 34.4 (13 Aug 2024 01:04), Max: 36.8 (12 Aug 2024 17:48)  T(F): 93.9 (13 Aug 2024 01:04), Max: 98.2 (12 Aug 2024 17:48)  HR: 44 (13 Aug 2024 00:00) (40 - 82)  BP: 110/60 (12 Aug 2024 20:00) (110/60 - 240/120)  BP(mean): 80 (12 Aug 2024 20:00) (80 - 157)  RR: 11 (13 Aug 2024 00:00) (11 - 34)  SpO2: 100% (13 Aug 2024 00:00) (91% - 100%)    Parameters below as of 13 Aug 2024 00:00  Patient On (Oxygen Delivery Method): nasal cannula  O2 Flow (L/min): 2      I&O's Detail    12 Aug 2024 07:01  -  13 Aug 2024 01:13  --------------------------------------------------------  IN:    Dexmedetomidine: 138.2 mL    IV PiggyBack: 150 mL    Ketamine: 18 mL    Ketamine: 102 mL    NiCARdipine: 22.5 mL    sodium chloride 0.9%: 700 mL    sodium chloride 0.9%: 140 mL    Sodium Chloride 0.9% Bolus: 1000 mL  Total IN: 2270.7 mL    OUT:    Bulb (mL): 125 mL    VAC (Vacuum Assisted Closure) System (mL): 100 mL    Voided (mL): 1730 mL  Total OUT: 1955 mL    Total NET: 315.7 mL    I&O's Summary    12 Aug 2024 07:01  -  13 Aug 2024 01:13  --------------------------------------------------------  IN: 2270.7 mL / OUT: 1955 mL / NET: 315.7 mL    PHYSICAL EXAM:  General: Pt is laying flat in bed, in NAD, on RA  HEENT: PERRL 2mm, EOMI B/L, face symmetric, tongue midline  Cardiovascular: +1st degree heart block, bradycardia, normal S1 and S2   Respiratory: non-labored breathing, symmetric chest rise   GI: abd soft, NTND   Neuro: A&O x 3, no aphasia, speech clear, no dysmetria, no pronator drift  Strength 5/5 throughout all 4 extremities  Sensation intact to light touch throughout   Vascular: Distal pulses 2+ x4, no calf edema or erythema  Wounds: Posterior spine wound C/D/I    DEVICE/DRAIN DRESSING: Superficial EZEKIEL x 1, deep HMV x 1     TUBES/LINES:  [] CVC  [X] A-line  [] Lumbar Drain  [] Ventriculostomy  [] Other    DIET:  [X] NPO until more awake for bedside swallow assessment   [] Mechanical  [] Tube feeds    LABS:  PT/INR - ( 12 Aug 2024 12:58 )   PT: 12.0 sec;   INR: 1.05          PTT - ( 12 Aug 2024 12:58 )  PTT:27.4 sec  Urinalysis Basic - ( 12 Aug 2024 12:58 )    Color: x / Appearance: x / SG: x / pH: x  Gluc: 178 mg/dL / Ketone: x  / Bili: x / Urobili: x   Blood: x / Protein: x / Nitrite: x   Leuk Esterase: x / RBC: x / WBC x   Sq Epi: x / Non Sq Epi: x / Bacteria: x      CAPILLARY BLOOD GLUCOSE    POCT Blood Glucose.: 157 mg/dL (12 Aug 2024 23:16)  POCT Blood Glucose.: 219 mg/dL (12 Aug 2024 18:23)    Allergies    latex (Blisters)  No Known Drug Allergies    Intolerances      MEDICATIONS:  Antibiotics:  ceFAZolin   IVPB 2000 milliGRAM(s) IV Intermittent every 8 hours    Neuro:  acetaminophen   IVPB .. 1000 milliGRAM(s) IV Intermittent every 8 hours  buprenorphine 2 mG/naloxone 0.5 mG SL Film 0.5 Film(s) SubLingual two times a day  methadone    Tablet 5 milliGRAM(s) Oral every 8 hours  methocarbamol 500 milliGRAM(s) Oral every 8 hours  zolpidem 5 milliGRAM(s) Oral at bedtime PRN    Anticoagulation:    OTHER:  allopurinol 100 milliGRAM(s) Oral daily  atorvastatin 80 milliGRAM(s) Oral at bedtime  atropine Injectable 1 milliGRAM(s) IntraMuscular once PRN  budesonide 160 MICROgram(s)/formoterol 4.5 MICROgram(s) Inhaler 2 Puff(s) Inhalation once  chlorhexidine 2% Cloths 1 Application(s) Topical <User Schedule>  finasteride 5 milliGRAM(s) Oral daily  insulin lispro (ADMELOG) corrective regimen sliding scale   SubCutaneous every 6 hours  naloxone Injectable 0.4 milliGRAM(s) IV Push once PRN  polyethylene glycol 3350 17 Gram(s) Oral daily  senna 2 Tablet(s) Oral at bedtime  tamsulosin 0.4 milliGRAM(s) Oral at bedtime    IVF:  multivitamin 1 Tablet(s) Oral daily  sodium chloride 0.9%. 1000 milliLiter(s) IV Continuous <Continuous>      ASSESSMENT:  83 y/o male with hx HTN, HLD, BPH,  T2DM (Hgba1c 6.7), CAD s/p stent about 4 yrs ago (on Plavix), +antiphospholipid antibody w history of B/L LE DVT was on Xarelto (4/2023 dx), CKD, Nephrolithiasis, Gout, Interstitial Lung Disease, L1-S1 fusion in 2020 @[facility]. Was worked up for severe, worsening low back pain, worse with ambulation. 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 Now s/p revision T10-L2 fusion, laminectomy T2/L1 (8/12).      M40.205    Handoff    Diabetes    BPH (benign prostatic hypertrophy)    Hyperlipidemia    Gout    HTN (hypertension)    CAD (coronary artery disease)    Chronic kidney disease (CKD)    Back pain    Deep vein thrombosis (DVT)    H/O kyphosis    CRI (chronic renal insufficiency)    History of DVT in adulthood    Venous insufficiency    H/O: depression    H/O edema    Chronic pain syndrome    PAF (paroxysmal atrial fibrillation)    History of TIAs    HTN (hypertension)    CAD (coronary artery disease)    Lumbar pseudoarthrosis    Lumbar pseudoarthrosis    Revision, fusion, spine, thoracic    H/O heart artery stent    H/O shoulder surgery    History of back surgery    H/O neck surgery    H/O total knee replacement    PAF (paroxysmal atrial fibrillation)    SysAdmin_VstLnk    PLAN:  Neuro:  - Neuro/vitals q1hr  - Pain control: methadone 5q8h, robaxin, tylenol standing, c/w home buprenorphine  - HMV x1, EZEKIEL x1, per plastics  - pain managaement following  - Insomnia: home zolpidem 5mg qhs  - pending postop standing XRs    Cardio:  - SBP<160  - EKG 8/12: 1st degree heart block, atropine @ bedside   - Hx cardiac stents: holding home ASA, plavix   - HLD: atorvastatin 80mg qd    Pulm:  - RA  - ILD: continue home Symbicort     GI:  - ADAT  - Bowel regimen    Renal:  - IVF while NPO  - +rothman (uretral stricture)  - CKD: trend Cr   - BPH: home finaseteride, flomax  - Gout: allopurinol 100mg daily    Heme:  - SCDs for dvt ppx  - hx b/l LE DVTs: hold home Xarelto, pending LE duplex    ID:  - postop ancef  - afebrile    Dispo: ICU status, full code, PT/OT pending    D/w Dr Auguste, Dr Hernandez

## 2024-08-13 NOTE — OCCUPATIONAL THERAPY INITIAL EVALUATION ADULT - ASR WT BEARING STATUS EVAL
[FreeTextEntry1] : Mr. LAYA DURAND comes in today for his annual followup. He reports moderate lower urinary tract symptoms (mostly frequency and urgency) and nocturia x 1. In 2012 he had one episode of urinary retention requiring catheterization (without an identifiable predisposing factor). He drinks 3-4 caffeinated beverages daily.  He had been prescribed alfuzosin but does not recall taking this. IPSS: 11/3 Sono (performed to assess bladder emptying): 82cc PVR  In January 2023 he had a cystoscopy for evaluation of microscopic hematuria which was, fortunately, negative.  He denies any gross hematuria.  Mr. Durand has a history of erectile dysfunction and has achieved a suboptimal response ("70%") with Viagra 100 mg.  He does not want to pursue additional options.  He also has Peyronie's disease with a 40 degree right dorsal lateral curve.  Apparently, this does not prevent penetration. He denies any penile pain.   Testosterone: 2/23/21--408; 1/28/21--509.0;  PSAs: 12/7/23--5.76; 10/4/22--1.89; 2/23/21--1.69; 1/18/21--1.90;   Urine cyt:  10/3/22--Neg  Scrotal sono: 3/10/21--Venous thrombosis of left varicocele.  Small right hydrocele and small right varicocele.  CT: 11/28/22--No urinary tract calculi, hydronephrosis, or enhancing mass. 2.1cm exophytic right upper pole renal cyst with layering milk of calcium. 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  
no weight-bearing restrictions

## 2024-08-13 NOTE — PROGRESS NOTE ADULT - SUBJECTIVE AND OBJECTIVE BOX
PAIN MANAGEMENT CONSULT NOTE    Interval Events:  - POD # 1 s/p revision T10-L2 fusion, laminectomy T2/L1    HOME MEDICATIONS:   Please refer to initial HNP    Allergies    latex (Blisters)  No Known Drug Allergies    Intolerances        PAIN MEDICATIONS:  acetaminophen   IVPB .. 1000 milliGRAM(s) IV Intermittent every 8 hours  buprenorphine 2 mG/naloxone 0.5 mG SL Film 0.5 Film(s) SubLingual two times a day  HYDROmorphone  Injectable 0.5 milliGRAM(s) IV Push every 4 hours PRN  methadone    Tablet 5 milliGRAM(s) Oral every 8 hours  methocarbamol 500 milliGRAM(s) Oral every 8 hours  zolpidem 5 milliGRAM(s) Oral at bedtime PRN    Heme:    Antibiotics:    Cardiovascular:    GI:  atropine Injectable 1 milliGRAM(s) IntraMuscular once PRN  polyethylene glycol 3350 17 Gram(s) Oral daily  senna 2 Tablet(s) Oral at bedtime    Endocrine:  allopurinol 100 milliGRAM(s) Oral daily  atorvastatin 80 milliGRAM(s) Oral at bedtime  finasteride 5 milliGRAM(s) Oral daily  insulin lispro (ADMELOG) corrective regimen sliding scale   SubCutaneous every 6 hours    All Other Medications:  chlorhexidine 2% Cloths 1 Application(s) Topical <User Schedule>  multivitamin 1 Tablet(s) Oral daily  naloxone Injectable 0.4 milliGRAM(s) IV Push once PRN  sodium chloride 0.9%. 1000 milliLiter(s) IV Continuous <Continuous>  tamsulosin 0.4 milliGRAM(s) Oral at bedtime      Vital Signs Last 24 Hrs  T(C): 36.7 (13 Aug 2024 05:17), Max: 36.8 (12 Aug 2024 17:48)  T(F): 98.1 (13 Aug 2024 05:17), Max: 98.2 (12 Aug 2024 17:48)  HR: 57 (13 Aug 2024 07:00) (40 - 82)  BP: 110/60 (12 Aug 2024 20:00) (110/60 - 240/120)  BP(mean): 80 (12 Aug 2024 20:00) (80 - 157)  RR: 11 (13 Aug 2024 07:00) (11 - 34)  SpO2: 97% (13 Aug 2024 07:00) (91% - 100%)    Parameters below as of 13 Aug 2024 07:00  Patient On (Oxygen Delivery Method): room air        LABS:                        10.2   10.62 )-----------( 172      ( 13 Aug 2024 05:30 )             31.5     08-13    139  |  109<H>  |  41<H>  ----------------------------<  113<H>  4.6   |  21<L>  |  1.79<H>    Ca    9.6      13 Aug 2024 05:30  Phos  4.1     08-13  Mg     2.1     08-13      PT/INR - ( 12 Aug 2024 12:58 )   PT: 12.0 sec;   INR: 1.05          PTT - ( 12 Aug 2024 12:58 )  PTT:27.4 sec  Urinalysis Basic - ( 13 Aug 2024 05:30 )    Color: x / Appearance: x / SG: x / pH: x  Gluc: 113 mg/dL / Ketone: x  / Bili: x / Urobili: x   Blood: x / Protein: x / Nitrite: x   Leuk Esterase: x / RBC: x / WBC x   Sq Epi: x / Non Sq Epi: x / Bacteria: x        RADIOLOGY:    Drug Screen:        REVIEW OF SYSTEMS:  CONSTITUTIONAL: Denies fever or fatigue   EYES: Denies eye pain, visual disturbances  HEENT: Denies difficulty hearing, throat/neck pain or stiffness  RESPIRATORY: Denies SOB, cough, wheezing  CARDIOVASCULAR: Denies chest pain, palpitations.   GASTROINTESTINAL: Endorses +flatus, BMs. Denies nausea, vomiting, abdominal or epigastric pain.   GENITOURINARY: Denies dysuria, frequency, or incontinence  NEUROLOGICAL: Endorses *** numbness, tingling. Denies headaches, loss of strength, tremors, dizziness or lightheadedness with pain medications.   MUSCULOSKELETAL: Denies joint pain or swelling      FUNCTIONAL ASSESSMENT:  PAIN SCORE AT REST:         SCALE USED: (1-10 VNRS)  PAIN SCORE WITH ACTIVITY:         SCALE USED: (1-10 VNRS)    PAIN ASSESSMENT:    FOCUSED PHYSICAL EXAM  GENERAL: Laying in bed, NAD  NEURO: CN II-XII grossly intact, EOMI  PULM: unlabored  CV: Regular rate and rhythm  ABDOMEN: Soft, Nontender, Nondistended  EXTREMITIES:  2+ Peripheral Pulses, No clubbing, cyanosis, or edema  SKIN: No rashes or lesions      ASSESSMENT: 85 y/o male with hx HTN, HLD, BPH,  T2DM (Hgba1c 6.7), CAD s/p stent about 4 yrs ago (on Plavix), +antiphospholipid antibody w history of B/L LE DVT was on Xarelto (4/2023 dx), CKD, Nephrolithiasis, Gout, Interstitial Lung Disease, L1-S1 fusion in 2020 @[facility]. Was worked up for severe, worsening low back pain, worse with ambulation. 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 Now s/p revision T10-L2 fusion, laminectomy T2/L1 (8/12).      PLAN:   - continue tylenol 1gm q8h  - continue robaxin 500mg q8h  - continue home suboxone 2mg BID  - ***methadone  - ***IV dilaudid  - monitor closely for oversedation, ensure narcan is ordered  - escalate bowel regimen as needed for bowel movement daily  ***recs not yet finalized***    - Bowel regimen: Senna, miralax   - Nausea ppx: Zofran as needed  - Functional Goals: Pt will get OOB with PT today. Pt will resume previous level of activity without impairment from surgery.   - Additional Consults: None recommended.   - Additional Labs/Imaging:  None recommended.     - Discharge Planning: per primary team  - Pain Management follow up plan: will continue to follow    Plan d/w Dr. Samuel.     Ruthie Harrison NP  Acute Pain Service PAIN MANAGEMENT CONSULT NOTE    Interval Events:  - POD # 1 s/p revision T10-L2 fusion, laminectomy T2/L1    HOME MEDICATIONS:   Please refer to initial HNP    Allergies    latex (Blisters)  No Known Drug Allergies    Intolerances        PAIN MEDICATIONS:  acetaminophen   IVPB .. 1000 milliGRAM(s) IV Intermittent every 8 hours  buprenorphine 2 mG/naloxone 0.5 mG SL Film 0.5 Film(s) SubLingual two times a day  HYDROmorphone  Injectable 0.5 milliGRAM(s) IV Push every 4 hours PRN  methadone    Tablet 5 milliGRAM(s) Oral every 8 hours  methocarbamol 500 milliGRAM(s) Oral every 8 hours  zolpidem 5 milliGRAM(s) Oral at bedtime PRN    Heme:    Antibiotics:    Cardiovascular:    GI:  atropine Injectable 1 milliGRAM(s) IntraMuscular once PRN  polyethylene glycol 3350 17 Gram(s) Oral daily  senna 2 Tablet(s) Oral at bedtime    Endocrine:  allopurinol 100 milliGRAM(s) Oral daily  atorvastatin 80 milliGRAM(s) Oral at bedtime  finasteride 5 milliGRAM(s) Oral daily  insulin lispro (ADMELOG) corrective regimen sliding scale   SubCutaneous every 6 hours    All Other Medications:  chlorhexidine 2% Cloths 1 Application(s) Topical <User Schedule>  multivitamin 1 Tablet(s) Oral daily  naloxone Injectable 0.4 milliGRAM(s) IV Push once PRN  sodium chloride 0.9%. 1000 milliLiter(s) IV Continuous <Continuous>  tamsulosin 0.4 milliGRAM(s) Oral at bedtime      Vital Signs Last 24 Hrs  T(C): 36.7 (13 Aug 2024 05:17), Max: 36.8 (12 Aug 2024 17:48)  T(F): 98.1 (13 Aug 2024 05:17), Max: 98.2 (12 Aug 2024 17:48)  HR: 57 (13 Aug 2024 07:00) (40 - 82)  BP: 110/60 (12 Aug 2024 20:00) (110/60 - 240/120)  BP(mean): 80 (12 Aug 2024 20:00) (80 - 157)  RR: 11 (13 Aug 2024 07:00) (11 - 34)  SpO2: 97% (13 Aug 2024 07:00) (91% - 100%)    Parameters below as of 13 Aug 2024 07:00  Patient On (Oxygen Delivery Method): room air        LABS:                        10.2   10.62 )-----------( 172      ( 13 Aug 2024 05:30 )             31.5     08-13    139  |  109<H>  |  41<H>  ----------------------------<  113<H>  4.6   |  21<L>  |  1.79<H>    Ca    9.6      13 Aug 2024 05:30  Phos  4.1     08-13  Mg     2.1     08-13      PT/INR - ( 12 Aug 2024 12:58 )   PT: 12.0 sec;   INR: 1.05          PTT - ( 12 Aug 2024 12:58 )  PTT:27.4 sec  Urinalysis Basic - ( 13 Aug 2024 05:30 )    Color: x / Appearance: x / SG: x / pH: x  Gluc: 113 mg/dL / Ketone: x  / Bili: x / Urobili: x   Blood: x / Protein: x / Nitrite: x   Leuk Esterase: x / RBC: x / WBC x   Sq Epi: x / Non Sq Epi: x / Bacteria: x        RADIOLOGY:    Drug Screen:        REVIEW OF SYSTEMS:  CONSTITUTIONAL: Denies fever or fatigue   EYES: Denies eye pain, visual disturbances  HEENT: Denies difficulty hearing, throat/neck pain or stiffness  RESPIRATORY: Denies SOB, cough, wheezing  CARDIOVASCULAR: Denies chest pain, palpitations.   GASTROINTESTINAL: Endorses +flatus, no BM yet. Denies nausea, vomiting, abdominal or epigastric pain.   GENITOURINARY: Denies dysuria, frequency, or incontinence  NEUROLOGICAL: Endorses numbness, tingling to b/l hands and feet. Denies headaches, loss of strength, tremors, dizziness or lightheadedness with pain medications.   MUSCULOSKELETAL: Denies joint pain or swelling      FUNCTIONAL ASSESSMENT:  PAIN SCORE AT REST:         SCALE USED: (1-10 VNRS)  PAIN SCORE WITH ACTIVITY:         SCALE USED: (1-10 VNRS)    PAIN ASSESSMENT:  - "9.5"/10 low back pain, "just pain"    FOCUSED PHYSICAL EXAM  GENERAL: Laying in bed, in mild distress 2/2 pain  NEURO: CN II-XII grossly intact, EOMI  PULM: unlabored  CV: Regular rate and rhythm  ABDOMEN: Soft, Nontender, Nondistended  EXTREMITIES:  2+ Peripheral Pulses, No clubbing, cyanosis, or edema  SKIN: No rashes or lesions      ASSESSMENT: 85 y/o male with hx HTN, HLD, BPH,  T2DM (Hgba1c 6.7), CAD s/p stent about 4 yrs ago (on Plavix), +antiphospholipid antibody w history of B/L LE DVT was on Xarelto (4/2023 dx), CKD, Nephrolithiasis, Gout, Interstitial Lung Disease, L1-S1 fusion in 2020 @[facility]. Was worked up for severe, worsening low back pain, worse with ambulation. 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 Now s/p revision T10-L2 fusion, laminectomy T2/L1 (8/12).      PLAN:   - continue tylenol 1gm q8h  - continue robaxin 500mg q8h  - continue home suboxone 2mg BID  - ***methadone  - ***IV dilaudid  - monitor closely for oversedation, ensure narcan is ordered  - escalate bowel regimen as needed for bowel movement daily  ***recs not yet finalized***    - Bowel regimen: Senna, miralax   - Nausea ppx: Zofran as needed  - Functional Goals: Pt will get OOB with PT today. Pt will resume previous level of activity without impairment from surgery.   - Additional Consults: None recommended.   - Additional Labs/Imaging:  None recommended.     - Discharge Planning: per primary team  - Pain Management follow up plan: will continue to follow    Plan d/w Dr. Samuel.     Ruthie Harrison NP  Acute Pain Service PAIN MANAGEMENT CONSULT NOTE    Interval Events:  - POD # 1 s/p revision T10-L2 fusion, laminectomy T2/L1  - increased pain overnight, pt briefly on ketamine and precedex gtts though stopped 2/2 depressed mental status    HOME MEDICATIONS:   Please refer to initial HNP    Allergies    latex (Blisters)  No Known Drug Allergies    Intolerances        PAIN MEDICATIONS:  acetaminophen   IVPB .. 1000 milliGRAM(s) IV Intermittent every 8 hours  buprenorphine 2 mG/naloxone 0.5 mG SL Film 0.5 Film(s) SubLingual two times a day  HYDROmorphone  Injectable 0.5 milliGRAM(s) IV Push every 4 hours PRN  methadone    Tablet 5 milliGRAM(s) Oral every 8 hours  methocarbamol 500 milliGRAM(s) Oral every 8 hours  zolpidem 5 milliGRAM(s) Oral at bedtime PRN    Heme:    Antibiotics:    Cardiovascular:    GI:  atropine Injectable 1 milliGRAM(s) IntraMuscular once PRN  polyethylene glycol 3350 17 Gram(s) Oral daily  senna 2 Tablet(s) Oral at bedtime    Endocrine:  allopurinol 100 milliGRAM(s) Oral daily  atorvastatin 80 milliGRAM(s) Oral at bedtime  finasteride 5 milliGRAM(s) Oral daily  insulin lispro (ADMELOG) corrective regimen sliding scale   SubCutaneous every 6 hours    All Other Medications:  chlorhexidine 2% Cloths 1 Application(s) Topical <User Schedule>  multivitamin 1 Tablet(s) Oral daily  naloxone Injectable 0.4 milliGRAM(s) IV Push once PRN  sodium chloride 0.9%. 1000 milliLiter(s) IV Continuous <Continuous>  tamsulosin 0.4 milliGRAM(s) Oral at bedtime      Vital Signs Last 24 Hrs  T(C): 36.7 (13 Aug 2024 05:17), Max: 36.8 (12 Aug 2024 17:48)  T(F): 98.1 (13 Aug 2024 05:17), Max: 98.2 (12 Aug 2024 17:48)  HR: 57 (13 Aug 2024 07:00) (40 - 82)  BP: 110/60 (12 Aug 2024 20:00) (110/60 - 240/120)  BP(mean): 80 (12 Aug 2024 20:00) (80 - 157)  RR: 11 (13 Aug 2024 07:00) (11 - 34)  SpO2: 97% (13 Aug 2024 07:00) (91% - 100%)    Parameters below as of 13 Aug 2024 07:00  Patient On (Oxygen Delivery Method): room air        LABS:                        10.2   10.62 )-----------( 172      ( 13 Aug 2024 05:30 )             31.5     08-13    139  |  109<H>  |  41<H>  ----------------------------<  113<H>  4.6   |  21<L>  |  1.79<H>    Ca    9.6      13 Aug 2024 05:30  Phos  4.1     08-13  Mg     2.1     08-13      PT/INR - ( 12 Aug 2024 12:58 )   PT: 12.0 sec;   INR: 1.05          PTT - ( 12 Aug 2024 12:58 )  PTT:27.4 sec  Urinalysis Basic - ( 13 Aug 2024 05:30 )    Color: x / Appearance: x / SG: x / pH: x  Gluc: 113 mg/dL / Ketone: x  / Bili: x / Urobili: x   Blood: x / Protein: x / Nitrite: x   Leuk Esterase: x / RBC: x / WBC x   Sq Epi: x / Non Sq Epi: x / Bacteria: x        RADIOLOGY:    Drug Screen:        REVIEW OF SYSTEMS:  CONSTITUTIONAL: Denies fever or fatigue   EYES: Denies eye pain, visual disturbances  HEENT: Denies difficulty hearing, throat/neck pain or stiffness  RESPIRATORY: Denies SOB, cough, wheezing  CARDIOVASCULAR: Denies chest pain, palpitations.   GASTROINTESTINAL: Endorses +flatus, no BM yet. Denies nausea, vomiting, abdominal or epigastric pain.   GENITOURINARY: Denies dysuria, frequency, or incontinence  NEUROLOGICAL: Endorses numbness, tingling to b/l hands and feet. Denies headaches, loss of strength, tremors, dizziness or lightheadedness with pain medications.   MUSCULOSKELETAL: Denies joint pain or swelling      FUNCTIONAL ASSESSMENT:  PAIN SCORE AT REST:         SCALE USED: (1-10 VNRS)  PAIN SCORE WITH ACTIVITY:         SCALE USED: (1-10 VNRS)    PAIN ASSESSMENT:  - "9.5"/10 low back pain, "just pain"    FOCUSED PHYSICAL EXAM  GENERAL: Laying in bed, in mild distress 2/2 pain  NEURO: CN II-XII grossly intact, EOMI  PULM: unlabored  CV: Regular rate and rhythm  ABDOMEN: Soft, Nontender, Nondistended  EXTREMITIES:  2+ Peripheral Pulses, No clubbing, cyanosis, or edema  SKIN: No rashes or lesions      ASSESSMENT: 85 y/o male with hx HTN, HLD, BPH,  T2DM (Hgba1c 6.7), CAD s/p stent about 4 yrs ago (on Plavix), +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. Was worked up for severe, worsening low back pain, worse with ambulation. 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 Now s/p revision T10-L2 fusion, laminectomy T2/L1 (8/12).      PLAN:   - continue tylenol 1gm q8h  - continue robaxin 500mg q8h  - can hold home suboxone 2mg BID for now, will call outpatient pain management prescriber for additional collateral  - continue methadone 5mg q8h  - agree with dilaudid PCA 0.2mg/q6min lockout  - monitor closely for oversedation, ensure narcan is ordered  - escalate bowel regimen as needed for bowel movement daily    - Bowel regimen: Senna, miralax   - Nausea ppx: Zofran as needed  - Functional Goals: Pt will get OOB with PT today. Pt will resume previous level of activity without impairment from surgery.   - Additional Consults: None recommended.   - Additional Labs/Imaging:  None recommended.     - Discharge Planning: per primary team  - Pain Management follow up plan: will continue to follow    Plan d/w Dr. Samuel.     Ruthie Harrison NP  Acute Pain Service

## 2024-08-13 NOTE — OCCUPATIONAL THERAPY INITIAL EVALUATION ADULT - DIAGNOSIS, OT EVAL
Pt. is POD# 1(8/12) s/p T10-L2 revision of fusion. Upon assessment, pt. demo increased pain, reduced flexibility and impaired balance all impacting engagement in ADLs and functional mob/transfers.

## 2024-08-13 NOTE — PROGRESS NOTE ADULT - CRITICAL CARE SERVICES PROVIDED
Patient is critically ill, requiring critical care services.
177.8

## 2024-08-14 PROCEDURE — 99233 SBSQ HOSP IP/OBS HIGH 50: CPT

## 2024-08-14 PROCEDURE — 93970 EXTREMITY STUDY: CPT | Mod: 26

## 2024-08-14 PROCEDURE — 99024 POSTOP FOLLOW-UP VISIT: CPT

## 2024-08-14 PROCEDURE — 99223 1ST HOSP IP/OBS HIGH 75: CPT

## 2024-08-14 RX ORDER — OXYCODONE HYDROCHLORIDE 5 MG/1
10 TABLET ORAL EVERY 4 HOURS
Refills: 0 | Status: DISCONTINUED | OUTPATIENT
Start: 2024-08-14 | End: 2024-08-15

## 2024-08-14 RX ORDER — MIRTAZAPINE 30 MG
15 TABLET ORAL AT BEDTIME
Refills: 0 | Status: DISCONTINUED | OUTPATIENT
Start: 2024-08-14 | End: 2024-08-17

## 2024-08-14 RX ORDER — ACETAMINOPHEN 325 MG/1
1000 TABLET ORAL EVERY 8 HOURS
Refills: 0 | Status: COMPLETED | OUTPATIENT
Start: 2024-08-14 | End: 2024-08-17

## 2024-08-14 RX ORDER — OXYCODONE HYDROCHLORIDE 5 MG/1
5 TABLET ORAL EVERY 4 HOURS
Refills: 0 | Status: DISCONTINUED | OUTPATIENT
Start: 2024-08-14 | End: 2024-08-16

## 2024-08-14 RX ADMIN — ACETAMINOPHEN 1000 MILLIGRAM(S): 325 TABLET ORAL at 23:31

## 2024-08-14 RX ADMIN — OXYCODONE HYDROCHLORIDE 10 MILLIGRAM(S): 5 TABLET ORAL at 06:04

## 2024-08-14 RX ADMIN — OXYCODONE HYDROCHLORIDE 15 MILLIGRAM(S): 5 TABLET ORAL at 01:06

## 2024-08-14 RX ADMIN — POLYETHYLENE GLYCOL 3350 17 GRAM(S): 17 POWDER, FOR SOLUTION ORAL at 11:43

## 2024-08-14 RX ADMIN — FINASTERIDE 5 MILLIGRAM(S): 1 TABLET, FILM COATED ORAL at 11:43

## 2024-08-14 RX ADMIN — METHOCARBAMOL 500 MILLIGRAM(S): 750 TABLET, FILM COATED ORAL at 05:23

## 2024-08-14 RX ADMIN — Medication 100 MILLIGRAM(S): at 14:50

## 2024-08-14 RX ADMIN — ACETAMINOPHEN 1000 MILLIGRAM(S): 325 TABLET ORAL at 14:50

## 2024-08-14 RX ADMIN — Medication 1 PATCH: at 22:32

## 2024-08-14 RX ADMIN — HYDROMORPHONE HYDROCHLORIDE 1 MILLIGRAM(S): 2 TABLET ORAL at 01:56

## 2024-08-14 RX ADMIN — OXYCODONE HYDROCHLORIDE 10 MILLIGRAM(S): 5 TABLET ORAL at 23:22

## 2024-08-14 RX ADMIN — HYDROMORPHONE HYDROCHLORIDE 1 MILLIGRAM(S): 2 TABLET ORAL at 03:00

## 2024-08-14 RX ADMIN — Medication 1 PATCH: at 11:28

## 2024-08-14 RX ADMIN — Medication 1 TABLET(S): at 11:42

## 2024-08-14 RX ADMIN — Medication 100 MILLIGRAM(S): at 11:43

## 2024-08-14 RX ADMIN — METHADONE HYDROCHLORIDE 2.5 MILLIGRAM(S): 1 POWDER ORAL at 05:24

## 2024-08-14 RX ADMIN — OXYCODONE HYDROCHLORIDE 10 MILLIGRAM(S): 5 TABLET ORAL at 22:32

## 2024-08-14 RX ADMIN — METHOCARBAMOL 500 MILLIGRAM(S): 750 TABLET, FILM COATED ORAL at 14:50

## 2024-08-14 RX ADMIN — ACETAMINOPHEN 1000 MILLIGRAM(S): 325 TABLET ORAL at 15:50

## 2024-08-14 RX ADMIN — Medication 100 MILLIGRAM(S): at 05:23

## 2024-08-14 RX ADMIN — Medication 5000 UNIT(S): at 14:50

## 2024-08-14 RX ADMIN — Medication 5000 UNIT(S): at 05:24

## 2024-08-14 NOTE — PROGRESS NOTE ADULT - SUBJECTIVE AND OBJECTIVE BOX
PAIN MANAGEMENT CONSULT NOTE    Interval Events:  - increased pain despite dilaudid PCA, transitioned to oxycodone 10-15mg q4h prn      Since yesterday 6am, pt has required:  -       HOME MEDICATIONS:   Please refer to initial HNP    Allergies    latex (Blisters)  No Known Drug Allergies    Intolerances        PAIN MEDICATIONS:  gabapentin 100 milliGRAM(s) Oral three times a day  methadone    Tablet 2.5 milliGRAM(s) Oral every 8 hours  methocarbamol 500 milliGRAM(s) Oral every 8 hours  oxyCODONE    IR 10 milliGRAM(s) Oral every 4 hours PRN  oxyCODONE    IR 15 milliGRAM(s) Oral every 4 hours PRN  zolpidem 5 milliGRAM(s) Oral at bedtime PRN    Heme:  heparin   Injectable 5000 Unit(s) SubCutaneous every 8 hours    Antibiotics:    Cardiovascular:    GI:  atropine Injectable 1 milliGRAM(s) IntraMuscular once PRN  polyethylene glycol 3350 17 Gram(s) Oral daily  senna 2 Tablet(s) Oral at bedtime    Endocrine:  allopurinol 100 milliGRAM(s) Oral daily  atorvastatin 80 milliGRAM(s) Oral at bedtime  finasteride 5 milliGRAM(s) Oral daily  insulin lispro (ADMELOG) corrective regimen sliding scale   SubCutaneous every 6 hours    All Other Medications:  benzocaine/menthol Lozenge 1 Lozenge Oral four times a day PRN  lidocaine   4% Patch 1 Patch Transdermal every 24 hours  lidocaine   4% Patch 1 Patch Transdermal every 24 hours  multivitamin 1 Tablet(s) Oral daily  naloxone Injectable 0.4 milliGRAM(s) IV Push once PRN  tamsulosin 0.4 milliGRAM(s) Oral at bedtime      Vital Signs Last 24 Hrs  T(C): 36.4 (13 Aug 2024 22:00), Max: 37 (13 Aug 2024 09:03)  T(F): 97.6 (13 Aug 2024 22:00), Max: 98.6 (13 Aug 2024 09:03)  HR: 74 (14 Aug 2024 05:49) (62 - 88)  BP: 149/71 (14 Aug 2024 05:49) (99/54 - 169/87)  BP(mean): 92 (14 Aug 2024 05:49) (73 - 117)  RR: 16 (14 Aug 2024 05:49) (16 - 25)  SpO2: 98% (14 Aug 2024 05:49) (92% - 100%)    Parameters below as of 14 Aug 2024 05:49  Patient On (Oxygen Delivery Method): room air        LABS:                        10.2   10.62 )-----------( 172      ( 13 Aug 2024 05:30 )             31.5     08-13    139  |  109<H>  |  41<H>  ----------------------------<  113<H>  4.6   |  21<L>  |  1.79<H>    Ca    9.6      13 Aug 2024 05:30  Phos  4.1     08-13  Mg     2.1     08-13      PT/INR - ( 12 Aug 2024 12:58 )   PT: 12.0 sec;   INR: 1.05          PTT - ( 12 Aug 2024 12:58 )  PTT:27.4 sec  Urinalysis Basic - ( 13 Aug 2024 05:30 )    Color: x / Appearance: x / SG: x / pH: x  Gluc: 113 mg/dL / Ketone: x  / Bili: x / Urobili: x   Blood: x / Protein: x / Nitrite: x   Leuk Esterase: x / RBC: x / WBC x   Sq Epi: x / Non Sq Epi: x / Bacteria: x        RADIOLOGY:    Drug Screen:        REVIEW OF SYSTEMS:  CONSTITUTIONAL: Denies fever or fatigue   EYES: Denies eye pain, visual disturbances  HEENT: Denies difficulty hearing, throat/neck pain or stiffness  RESPIRATORY: Denies SOB, cough, wheezing  CARDIOVASCULAR: Denies chest pain, palpitations.   GASTROINTESTINAL: Endorses +flatus, BMs. Denies nausea, vomiting, abdominal or epigastric pain.   GENITOURINARY: Denies dysuria, frequency, or incontinence  NEUROLOGICAL: Endorses *** numbness, tingling. Denies headaches, loss of strength, tremors, dizziness or lightheadedness with pain medications.   MUSCULOSKELETAL: Denies joint pain or swelling      FUNCTIONAL ASSESSMENT:  PAIN SCORE AT REST:         SCALE USED: (1-10 VNRS)  PAIN SCORE WITH ACTIVITY:         SCALE USED: (1-10 VNRS)    PAIN ASSESSMENT:    FOCUSED PHYSICAL EXAM  GENERAL: Laying in bed, NAD  NEURO: CN II-XII grossly intact, EOMI  PULM: unlabored  CV: Regular rate and rhythm  ABDOMEN: Soft, Nontender, Nondistended  EXTREMITIES:  2+ Peripheral Pulses, No clubbing, cyanosis, or edema  SKIN: No rashes or lesions      ASSESSMENT: 85 y/o male with hx HTN, HLD, BPH,  T2DM (Hgba1c 6.7), CAD s/p stent about 4 yrs ago (on Plavix), +antiphospholipid antibody w history of B/L LE DVT was on Xarelto (4/2023 dx), CKD, Nephrolithiasis, Gout, Interstitial Lung Disease, L1-S1 fusion in 2020 @[facility]. Was worked up for severe, worsening low back pain, worse with ambulation. 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 Now s/p revision T10-L2 fusion, laminectomy T2/L1 (8/12).      PLAN:   - continue tylenol 1gm q8h  - continue robaxin 500mg q8h  - can hold home suboxone 2mg BID  - continue methadone 2.5mg q8h  - ***oxycodone 10-15mg q4h prn moderate-severe pain  - monitor closely for oversedation, ensure narcan is ordered  - escalate bowel regimen as needed for bowel movement daily  ***recs not yet finalized***    - Bowel regimen: Senna, miralax   - Nausea ppx: Zofran as needed  - Functional Goals: Pt will get OOB with PT today. Pt will resume previous level of activity without impairment from surgery.   - Additional Consults: None recommended.   - Additional Labs/Imaging:  None recommended.     - Discharge Planning: per primary team  - Pain Management follow up plan: will continue to follow    Plan d/w Dr. Samuel.     Ruthie Harrison NP  Acute Pain Service PAIN MANAGEMENT CONSULT NOTE    Interval Events:  - increased pain despite dilaudid PCA, transitioned to oxycodone 10-15mg q4h prn  - required 1x dose 1mg IV dilaudid overnight      Since yesterday 6am, pt has required:  - oxycodone 10mg x 1, 15mg x 2      HOME MEDICATIONS:   Please refer to initial HNP    Allergies    latex (Blisters)  No Known Drug Allergies    Intolerances        PAIN MEDICATIONS:  gabapentin 100 milliGRAM(s) Oral three times a day  methadone    Tablet 2.5 milliGRAM(s) Oral every 8 hours  methocarbamol 500 milliGRAM(s) Oral every 8 hours  oxyCODONE    IR 10 milliGRAM(s) Oral every 4 hours PRN  oxyCODONE    IR 15 milliGRAM(s) Oral every 4 hours PRN  zolpidem 5 milliGRAM(s) Oral at bedtime PRN    Heme:  heparin   Injectable 5000 Unit(s) SubCutaneous every 8 hours    Antibiotics:    Cardiovascular:    GI:  atropine Injectable 1 milliGRAM(s) IntraMuscular once PRN  polyethylene glycol 3350 17 Gram(s) Oral daily  senna 2 Tablet(s) Oral at bedtime    Endocrine:  allopurinol 100 milliGRAM(s) Oral daily  atorvastatin 80 milliGRAM(s) Oral at bedtime  finasteride 5 milliGRAM(s) Oral daily  insulin lispro (ADMELOG) corrective regimen sliding scale   SubCutaneous every 6 hours    All Other Medications:  benzocaine/menthol Lozenge 1 Lozenge Oral four times a day PRN  lidocaine   4% Patch 1 Patch Transdermal every 24 hours  lidocaine   4% Patch 1 Patch Transdermal every 24 hours  multivitamin 1 Tablet(s) Oral daily  naloxone Injectable 0.4 milliGRAM(s) IV Push once PRN  tamsulosin 0.4 milliGRAM(s) Oral at bedtime      Vital Signs Last 24 Hrs  T(C): 36.4 (13 Aug 2024 22:00), Max: 37 (13 Aug 2024 09:03)  T(F): 97.6 (13 Aug 2024 22:00), Max: 98.6 (13 Aug 2024 09:03)  HR: 74 (14 Aug 2024 05:49) (62 - 88)  BP: 149/71 (14 Aug 2024 05:49) (99/54 - 169/87)  BP(mean): 92 (14 Aug 2024 05:49) (73 - 117)  RR: 16 (14 Aug 2024 05:49) (16 - 25)  SpO2: 98% (14 Aug 2024 05:49) (92% - 100%)    Parameters below as of 14 Aug 2024 05:49  Patient On (Oxygen Delivery Method): room air        LABS:                        10.2   10.62 )-----------( 172      ( 13 Aug 2024 05:30 )             31.5     08-13    139  |  109<H>  |  41<H>  ----------------------------<  113<H>  4.6   |  21<L>  |  1.79<H>    Ca    9.6      13 Aug 2024 05:30  Phos  4.1     08-13  Mg     2.1     08-13      PT/INR - ( 12 Aug 2024 12:58 )   PT: 12.0 sec;   INR: 1.05          PTT - ( 12 Aug 2024 12:58 )  PTT:27.4 sec  Urinalysis Basic - ( 13 Aug 2024 05:30 )    Color: x / Appearance: x / SG: x / pH: x  Gluc: 113 mg/dL / Ketone: x  / Bili: x / Urobili: x   Blood: x / Protein: x / Nitrite: x   Leuk Esterase: x / RBC: x / WBC x   Sq Epi: x / Non Sq Epi: x / Bacteria: x        RADIOLOGY:    Drug Screen:        REVIEW OF SYSTEMS:  CONSTITUTIONAL: Denies fever or fatigue   EYES: Denies eye pain, visual disturbances  HEENT: Denies difficulty hearing, throat/neck pain or stiffness  RESPIRATORY: Denies SOB, cough, wheezing  CARDIOVASCULAR: Denies chest pain, palpitations.   GASTROINTESTINAL: Endorses +flatus, BMs. Denies nausea, vomiting, abdominal or epigastric pain.   GENITOURINARY: Denies dysuria, frequency, or incontinence  NEUROLOGICAL: Endorses *** numbness, tingling. Denies headaches, loss of strength, tremors, dizziness or lightheadedness with pain medications.   MUSCULOSKELETAL: Denies joint pain or swelling      FUNCTIONAL ASSESSMENT:  PAIN SCORE AT REST:         SCALE USED: (1-10 VNRS)  PAIN SCORE WITH ACTIVITY:         SCALE USED: (1-10 VNRS)    PAIN ASSESSMENT:    FOCUSED PHYSICAL EXAM  GENERAL: Laying in bed, NAD  NEURO: CN II-XII grossly intact, EOMI  PULM: unlabored  CV: Regular rate and rhythm  ABDOMEN: Soft, Nontender, Nondistended  EXTREMITIES:  2+ Peripheral Pulses, No clubbing, cyanosis, or edema  SKIN: No rashes or lesions      ASSESSMENT: 83 y/o male with hx HTN, HLD, BPH,  T2DM (Hgba1c 6.7), CAD s/p stent about 4 yrs ago (on Plavix), +antiphospholipid antibody w history of B/L LE DVT was on Xarelto (4/2023 dx), CKD, Nephrolithiasis, Gout, Interstitial Lung Disease, L1-S1 fusion in 2020 @[facility]. Was worked up for severe, worsening low back pain, worse with ambulation. 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 Now s/p revision T10-L2 fusion, laminectomy T2/L1 (8/12).      PLAN:   - start (PO) tylenol 1gm q8h  - continue robaxin 500mg q8h  - can hold home suboxone 2mg BID  - continue methadone 2.5mg q8h  - ***oxycodone 10-15mg q4h prn moderate-severe pain  - monitor closely for oversedation, ensure narcan is ordered  - escalate bowel regimen as needed for bowel movement daily  ***recs not yet finalized***    - Bowel regimen: Senna, miralax   - Nausea ppx: Zofran as needed  - Functional Goals: Pt will get OOB with PT today. Pt will resume previous level of activity without impairment from surgery.   - Additional Consults: None recommended.   - Additional Labs/Imaging:  None recommended.     - Discharge Planning: per primary team  - Pain Management follow up plan: will continue to follow    Plan d/w Dr. Samuel.     Ruthie Harrison, NP  Acute Pain Service PAIN MANAGEMENT CONSULT NOTE    Interval Events:  - increased pain despite dilaudid PCA, transitioned to oxycodone 10-15mg q4h prn  - required 1x dose 1mg IV dilaudid overnight, was also given ambien and xanax      Since yesterday 6am, pt has required:  - oxycodone 10mg x 1, 15mg x 2      HOME MEDICATIONS:   Please refer to initial HNP    Allergies    latex (Blisters)  No Known Drug Allergies    Intolerances        PAIN MEDICATIONS:  gabapentin 100 milliGRAM(s) Oral three times a day  methadone    Tablet 2.5 milliGRAM(s) Oral every 8 hours  methocarbamol 500 milliGRAM(s) Oral every 8 hours  oxyCODONE    IR 10 milliGRAM(s) Oral every 4 hours PRN  oxyCODONE    IR 15 milliGRAM(s) Oral every 4 hours PRN  zolpidem 5 milliGRAM(s) Oral at bedtime PRN    Heme:  heparin   Injectable 5000 Unit(s) SubCutaneous every 8 hours    Antibiotics:    Cardiovascular:    GI:  atropine Injectable 1 milliGRAM(s) IntraMuscular once PRN  polyethylene glycol 3350 17 Gram(s) Oral daily  senna 2 Tablet(s) Oral at bedtime    Endocrine:  allopurinol 100 milliGRAM(s) Oral daily  atorvastatin 80 milliGRAM(s) Oral at bedtime  finasteride 5 milliGRAM(s) Oral daily  insulin lispro (ADMELOG) corrective regimen sliding scale   SubCutaneous every 6 hours    All Other Medications:  benzocaine/menthol Lozenge 1 Lozenge Oral four times a day PRN  lidocaine   4% Patch 1 Patch Transdermal every 24 hours  lidocaine   4% Patch 1 Patch Transdermal every 24 hours  multivitamin 1 Tablet(s) Oral daily  naloxone Injectable 0.4 milliGRAM(s) IV Push once PRN  tamsulosin 0.4 milliGRAM(s) Oral at bedtime      Vital Signs Last 24 Hrs  T(C): 36.4 (13 Aug 2024 22:00), Max: 37 (13 Aug 2024 09:03)  T(F): 97.6 (13 Aug 2024 22:00), Max: 98.6 (13 Aug 2024 09:03)  HR: 74 (14 Aug 2024 05:49) (62 - 88)  BP: 149/71 (14 Aug 2024 05:49) (99/54 - 169/87)  BP(mean): 92 (14 Aug 2024 05:49) (73 - 117)  RR: 16 (14 Aug 2024 05:49) (16 - 25)  SpO2: 98% (14 Aug 2024 05:49) (92% - 100%)    Parameters below as of 14 Aug 2024 05:49  Patient On (Oxygen Delivery Method): room air        LABS:                        10.2   10.62 )-----------( 172      ( 13 Aug 2024 05:30 )             31.5     08-13    139  |  109<H>  |  41<H>  ----------------------------<  113<H>  4.6   |  21<L>  |  1.79<H>    Ca    9.6      13 Aug 2024 05:30  Phos  4.1     08-13  Mg     2.1     08-13      PT/INR - ( 12 Aug 2024 12:58 )   PT: 12.0 sec;   INR: 1.05          PTT - ( 12 Aug 2024 12:58 )  PTT:27.4 sec  Urinalysis Basic - ( 13 Aug 2024 05:30 )    Color: x / Appearance: x / SG: x / pH: x  Gluc: 113 mg/dL / Ketone: x  / Bili: x / Urobili: x   Blood: x / Protein: x / Nitrite: x   Leuk Esterase: x / RBC: x / WBC x   Sq Epi: x / Non Sq Epi: x / Bacteria: x        RADIOLOGY:    Drug Screen:        REVIEW OF SYSTEMS:  CONSTITUTIONAL: Denies fever or fatigue   EYES: Denies eye pain, visual disturbances  HEENT: Denies difficulty hearing, throat/neck pain or stiffness  RESPIRATORY: Denies SOB, cough, wheezing  CARDIOVASCULAR: Denies chest pain, palpitations.   GASTROINTESTINAL: Endorses +flatus, BMs. Denies nausea, vomiting, abdominal or epigastric pain.   GENITOURINARY: Denies dysuria, frequency, or incontinence  NEUROLOGICAL: Denies numbness, tingling. Denies headaches, loss of strength, tremors, dizziness or lightheadedness with pain medications.   MUSCULOSKELETAL: Denies joint pain or swelling      FUNCTIONAL ASSESSMENT:  PAIN SCORE AT REST:         SCALE USED: (1-10 VNRS)  PAIN SCORE WITH ACTIVITY:         SCALE USED: (1-10 VNRS)    PAIN ASSESSMENT:  - denies pain, will reassess in PM given drowsiness    FOCUSED PHYSICAL EXAM  GENERAL: Laying in bed, drowsy but arousable, can answer some questions  NEURO: CN II-XII grossly intact, EOMI  PULM: unlabored  CV: Regular rate and rhythm  ABDOMEN: Soft, Nontender, Nondistended  EXTREMITIES:  2+ Peripheral Pulses, No clubbing, cyanosis, or edema  SKIN: No rashes or lesions      ASSESSMENT: 83 y/o male with hx HTN, HLD, BPH,  T2DM (Hgba1c 6.7), CAD s/p stent about 4 yrs ago (on Plavix), +antiphospholipid antibody w history of B/L LE DVT was on Xarelto (4/2023 dx), CKD, Nephrolithiasis, Gout, Interstitial Lung Disease, L1-S1 fusion in 2020 @[facility]. Was worked up for severe, worsening low back pain, worse with ambulation. 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 Now s/p revision T10-L2 fusion, laminectomy T2/L1 (8/12).      PLAN:   - start (PO) tylenol 1gm q8h  - continue robaxin 500mg q8h  - can hold home suboxone 2mg BID  - dc methadone  - adjust oxycodone to 5-10mg q4h prn moderate-severe pain  - monitor closely for oversedation, ensure narcan is ordered  - escalate bowel regimen as needed for bowel movement daily    - Bowel regimen: Senna, miralax   - Nausea ppx: Zofran as needed  - Functional Goals: Pt will get OOB with PT today. Pt will resume previous level of activity without impairment from surgery.   - Additional Consults: None recommended.   - Additional Labs/Imaging:  None recommended.     - Discharge Planning: per primary team  - Pain Management follow up plan: will continue to follow    Plan d/w Dr. Samuel.     Ruthie Harrison NP  Acute Pain Service

## 2024-08-14 NOTE — CONSULT NOTE ADULT - SUBJECTIVE AND OBJECTIVE BOX
Patient is a 84y old  Male who presents with a chief complaint of T10-L2 revision of fusion (14 Aug 2024 14:50)      HPI:  83 y/o male with hx HTN, HLD, BPH,  T2DM (Hgba1c 6.7), CAD s/p stent~ 4 yrs ago (on Plavix), +antiphospholipid antibody w history of B/L LE DVT was on elliquis (4/2023 dx), CKD, Nephrolithiasis, Gout, Interstitial Lung Disease, L1-S1 fusion in 2020 @[facility].   Hx lower back pain radiating to right buttocks. He denies pain radiating to the lower extremities. Currently he can walk for approximately 1 block until the pain becomes debilitating and he has to stop. He also finds himself hunched over when ambulating. 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   He denies any urinary/bowel dysfunction, saddle anesthesia. Today patient also reports that he did not stop the suboxone as instructed (was supposed to stop 3 days preop) because he didn't want to take oxycodone.   (12 Aug 2024 06:34)    Patient seen and examined with his family at bedside around 2PM. Reports poor sleep inpatient and 8/10 low back pain worse with movement. Above hx confirmed, follows with Rosas Elkins of [facility] for PCP and last seen late July for preop exam. Echo 4/12/23: Normal biventricular function, mild to moderate AI, normal PA pressures. No recent ischemic evaluation due to CKD and avoidance of contrast. He has b/l LE edema which is multifactorial due to b/l DVT in April 2023 and venous insufficiency/CKD maintained on daily diuretics.  CAD with remote stent to LAD in 2013.  For CKD he follows with Dr Salomón Araujo who also noted Mr Jeff to be Cardiolipin + and referred him to heme onc, has not yet followed up.  Patient also has outpatient CT scans with progressing ILD    REVIEW OF SYSTEMS: see HPI    PAST MEDICAL & SURGICAL HISTORY:  Diabetes      BPH (benign prostatic hypertrophy)      Hyperlipidemia      Gout      HTN (hypertension)      CAD (coronary artery disease)  CARDIAC STENT X1 2019      Chronic kidney disease (CKD)      Back pain      Deep vein thrombosis (DVT)  BILAT 2024      H/O kyphosis  THORACOLUMBAR REGION      Venous insufficiency      H/O: depression      H/O edema      Chronic pain syndrome      PAF (paroxysmal atrial fibrillation)      H/O heart artery stent  2019 x1      H/O shoulder surgery  RIGHT      History of back surgery      H/O neck surgery      H/O total knee replacement  BILAT      MEDICATIONS:  MEDICATIONS  (STANDING):  acetaminophen     Tablet .. 1000 milliGRAM(s) Oral every 8 hours  allopurinol 100 milliGRAM(s) Oral daily  atorvastatin 80 milliGRAM(s) Oral at bedtime  budesonide 160 MICROgram(s)/formoterol 4.5 MICROgram(s) Inhaler 2 Puff(s) Inhalation once  finasteride 5 milliGRAM(s) Oral daily  gabapentin 100 milliGRAM(s) Oral three times a day  heparin   Injectable 5000 Unit(s) SubCutaneous every 8 hours  insulin lispro (ADMELOG) corrective regimen sliding scale   SubCutaneous every 6 hours  lidocaine   4% Patch 1 Patch Transdermal every 24 hours  lidocaine   4% Patch 1 Patch Transdermal every 24 hours  methocarbamol 500 milliGRAM(s) Oral every 8 hours  mirtazapine 15 milliGRAM(s) Oral at bedtime  multivitamin 1 Tablet(s) Oral daily  polyethylene glycol 3350 17 Gram(s) Oral daily  senna 2 Tablet(s) Oral at bedtime  tamsulosin 0.4 milliGRAM(s) Oral at bedtime    MEDICATIONS  (PRN):  atropine Injectable 1 milliGRAM(s) IntraMuscular once PRN Sustianed bradycardia HR less than 35 beats/min  benzocaine/menthol Lozenge 1 Lozenge Oral four times a day PRN Sore Throat  naloxone Injectable 0.4 milliGRAM(s) IV Push once PRN Signs of opioid overdose  oxyCODONE    IR 10 milliGRAM(s) Oral every 4 hours PRN Severe Pain (7 - 10)  oxyCODONE    IR 5 milliGRAM(s) Oral every 4 hours PRN Moderate Pain (4 - 6)      ALLERGIES:  Allergies    latex (Blisters)  No Known Drug Allergies    Intolerances        VITAL SIGNS:  Vital Signs Last 24 Hrs  T(C): 36.4 (14 Aug 2024 18:00), Max: 37.1 (14 Aug 2024 14:39)  T(F): 97.6 (14 Aug 2024 18:00), Max: 98.7 (14 Aug 2024 14:39)  HR: 92 (14 Aug 2024 16:15) (68 - 92)  BP: 136/67 (14 Aug 2024 16:15) (136/67 - 169/87)  BP(mean): 95 (14 Aug 2024 16:15) (92 - 117)  RR: 17 (14 Aug 2024 16:15) (16 - 18)  SpO2: 94% (14 Aug 2024 16:15) (92% - 98%)    Parameters below as of 14 Aug 2024 16:15  Patient On (Oxygen Delivery Method): room air        08-13-24 @ 07:01  -  08-14-24 @ 07:00  --------------------------------------------------------  IN:    IV PiggyBack: 100 mL    Oral Fluid: 300 mL    sodium chloride 0.9%: 140 mL  Total IN: 540 mL    OUT:    Bulb (mL): 60 mL    VAC (Vacuum Assisted Closure) System (mL): 90 mL    Voided (mL): 425 mL  Total OUT: 575 mL    Total NET: -35 mL      08-14-24 @ 07:01  -  08-14-24 @ 21:45  --------------------------------------------------------  IN:  Total IN: 0 mL    OUT:    Bulb (mL): 25 mL    VAC (Vacuum Assisted Closure) System (mL): 70 mL    Voided (mL): 300 mL  Total OUT: 395 mL    Total NET: -395 mL          PHYSICAL EXAM:  Constitutional: NAD, comfortable in bed.  HEENT: excoriations on scalp, EOMI, no conjunctival pallor or scleral icterus, MMM  Neck: Supple  Respiratory: Normal chest rise and WOB  Cardiovascular: RRR   Gastrointestinal: soft NTND   Extremities: wwp; no cyanosis or clubbing. + b/l LE edema.  Neurological: AAOx3, LE strength limited by pain   Skin: No gross skin abnormalities or rashes.  Back dressing c/d/i. HMV and EZEKIEL with serosanguinous drainage        LABS:                        10.2   10.62 )-----------( 172      ( 13 Aug 2024 05:30 )             31.5     08-13    139  |  109<H>  |  41<H>  ----------------------------<  113<H>  4.6   |  21<L>  |  1.79<H>    Ca    9.6      13 Aug 2024 05:30  Phos  4.1     08-13  Mg     2.1     08-13        Urinalysis Basic - ( 13 Aug 2024 05:30 )    Color: x / Appearance: x / SG: x / pH: x  Gluc: 113 mg/dL / Ketone: x  / Bili: x / Urobili: x   Blood: x / Protein: x / Nitrite: x   Leuk Esterase: x / RBC: x / WBC x   Sq Epi: x / Non Sq Epi: x / Bacteria: x          CAPILLARY BLOOD GLUCOSE              RADIOLOGY & ADDITIONAL TESTS: Reviewed.  < from: Xray Chest 1 View- PORTABLE-Routine (08.13.24 @ 05:32) >    IMPRESSION:    Similar appearance to prior exam 11/15/2023 except for lower thoracic   spine hardware appearing. Loop recorder device unchanged. No lung   infiltrate pleural effusion or pneumothorax.    < end of copied text >

## 2024-08-14 NOTE — PATIENT PROFILE ADULT - NSTRANSFERBELONGINGSDISPO_GEN_A_NUR
Recognizes 2 fingers or can read (II)/Smiles, shows teeth, opens mouth, sticks out tongue (V, VII, XI)/Normal speech (IX, X, XII)/Extraocular Eye Movement Intact  (III, IV, VI)/Shoulder shrug (XI)/Hearing intact (VIII)
with patient

## 2024-08-14 NOTE — PROGRESS NOTE ADULT - SUBJECTIVE AND OBJECTIVE BOX
HPI:  83 y/o male with hx HTN, HLD, BPH,  T2DM (Hgba1c 6.7), CAD s/p stent~ 4 yrs ago (on Plavix), +antiphospholipid antibody w history of B/L LE DVT was on elliquis (4/2023 dx), CKD, Nephrolithiasis, Gout, Interstitial Lung Disease, L1-S1 fusion in 2020 @[facility].   Hx lower back pain radiating to right buttocks. He denies pain radiating to the lower extremities. Currently he can walk for approximately 1 block until the pain becomes debilitating and he has to stop. He also finds himself hunched over when ambulating. 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   He denies any urinary/bowel dysfunction, saddle anesthesia. Today patient also reports that he did not stop the suboxone as instructed (was supposed to stop 3 days preop) because he didn't want to take oxycodone.   (12 Aug 2024 06:34)      HOSPITAL COURSE:   8/12: POD 0 s/p revision T10-L2 fusion, laminectomy T2/L1. DC precedex for bradycardia to 40s. Zofran DC'd for borderline QTc. Atropine @ bedside for 1st deg heart block. Warmed IVF, bearhugger for hypothermia 90F w/ improvement. DC'd ketamine gtt for somnolence.   8/13: POD 1 revision T10-L2 fusion, T2/L1 lami. TRANG overnight. 500cc bolus for soft SBP. hematuria due pt pulling on rothman.   8/14: POD2 s/p revision T10-L2 fusion, T2/L1 lami. Significant pain overnight, continued Oxy 10/15mg PRN, methadone 2.5mg q8hrs, given ambien and Xanax PRN overnight for anxiety, CP w/u with EKG stable, HR stable and AR interval improved. Cont to f/u BH recs. Pain mgmt following, need adjustment in pain recs. Pend postop xrays and LE dopplers. Pend AR.       OVERNIGHT EVENTS:  Vital Signs Last 24 Hrs  T(C): 36.4 (13 Aug 2024 22:00), Max: 37 (13 Aug 2024 09:03)  T(F): 97.6 (13 Aug 2024 22:00), Max: 98.6 (13 Aug 2024 09:03)  HR: 88 (13 Aug 2024 23:15) (49 - 88)  BP: 169/87 (13 Aug 2024 23:15) (99/54 - 169/87)  BP(mean): 117 (13 Aug 2024 23:15) (73 - 117)  RR: 18 (13 Aug 2024 23:15) (11 - 25)  SpO2: 92% (13 Aug 2024 23:15) (92% - 100%)    Parameters below as of 13 Aug 2024 23:15  Patient On (Oxygen Delivery Method): room air        I&O's Summary    12 Aug 2024 07:01  -  13 Aug 2024 07:00  --------------------------------------------------------  IN: 3410.7 mL / OUT: 2545 mL / NET: 865.7 mL    13 Aug 2024 07:01  -  14 Aug 2024 01:44  --------------------------------------------------------  IN: 540 mL / OUT: 450 mL / NET: 90 mL        PHYSICAL EXAM:  General: NAD, pt in moderate distress due to post-op pain, sitting-up in bedside chair, A&O x3, on RA  HEENT: CN II-XII grossly intact, PERRL 3mm, EOMI b/l, face symmetric, tongue midline, neck FROM  Cardiovascular: RRR, normal S1 and S2   Respiratory: lungs CTAB, no wheezing, rhonchi, or crackles   GI: normoactive BS to auscultation, abd soft, NTND   Neuro: no aphasia, speech clear, no dysmetria, no pronator drift  strength 5/5 throughout all 4 extremities  sensation intact to light touch throughout   Extremities: distal pulses 2+ x4   Wound/incision: posterior midline T/L spine incision with aquacel C/D/I       TUBES/LINES:  [] Rothman  [] Lumbar Drain  [X] Wound Drains - HMV and EZEKIEL   [] Others    DIET:  [] NPO  [X] Mechanical  [] Tube feeds    LABS:                        10.2   10.62 )-----------( 172      ( 13 Aug 2024 05:30 )             31.5     08-13    139  |  109<H>  |  41<H>  ----------------------------<  113<H>  4.6   |  21<L>  |  1.79<H>    Ca    9.6      13 Aug 2024 05:30  Phos  4.1     08-13  Mg     2.1     08-13      PT/INR - ( 12 Aug 2024 12:58 )   PT: 12.0 sec;   INR: 1.05          PTT - ( 12 Aug 2024 12:58 )  PTT:27.4 sec  Urinalysis Basic - ( 13 Aug 2024 05:30 )    Color: x / Appearance: x / SG: x / pH: x  Gluc: 113 mg/dL / Ketone: x  / Bili: x / Urobili: x   Blood: x / Protein: x / Nitrite: x   Leuk Esterase: x / RBC: x / WBC x   Sq Epi: x / Non Sq Epi: x / Bacteria: x          CAPILLARY BLOOD GLUCOSE      POCT Blood Glucose.: 126 mg/dL (13 Aug 2024 11:32)  POCT Blood Glucose.: 103 mg/dL (13 Aug 2024 05:23)      Drug Levels: [] N/A    CSF Analysis: [] N/A      Allergies    latex (Blisters)  No Known Drug Allergies    Intolerances      MEDICATIONS:  Antibiotics:    Neuro:  gabapentin 100 milliGRAM(s) Oral three times a day  HYDROmorphone  Injectable 1 milliGRAM(s) IV Push once PRN  methadone    Tablet 2.5 milliGRAM(s) Oral every 8 hours  methocarbamol 500 milliGRAM(s) Oral every 8 hours  oxyCODONE    IR 10 milliGRAM(s) Oral every 4 hours PRN  oxyCODONE    IR 15 milliGRAM(s) Oral every 4 hours PRN  zolpidem 5 milliGRAM(s) Oral at bedtime PRN    Anticoagulation:  heparin   Injectable 5000 Unit(s) SubCutaneous every 8 hours    OTHER:  allopurinol 100 milliGRAM(s) Oral daily  atorvastatin 80 milliGRAM(s) Oral at bedtime  atropine Injectable 1 milliGRAM(s) IntraMuscular once PRN  benzocaine/menthol Lozenge 1 Lozenge Oral four times a day PRN  budesonide 160 MICROgram(s)/formoterol 4.5 MICROgram(s) Inhaler 2 Puff(s) Inhalation once  finasteride 5 milliGRAM(s) Oral daily  insulin lispro (ADMELOG) corrective regimen sliding scale   SubCutaneous every 6 hours  lidocaine   4% Patch 1 Patch Transdermal every 24 hours  lidocaine   4% Patch 1 Patch Transdermal every 24 hours  naloxone Injectable 0.4 milliGRAM(s) IV Push once PRN  polyethylene glycol 3350 17 Gram(s) Oral daily  senna 2 Tablet(s) Oral at bedtime  tamsulosin 0.4 milliGRAM(s) Oral at bedtime    IVF:  multivitamin 1 Tablet(s) Oral daily    CULTURES:    RADIOLOGY & ADDITIONAL TESTS:      ASSESSMENT:  83 y/o male with hx HTN, HLD, BPH,  T2DM (Hgba1c 6.7), CAD s/p stent about 4 yrs ago (on Plavix), +antiphospholipid antibody w history of B/L LE DVT was on Xarelto (4/2023 dx), CKD, Nephrolithiasis, Gout, Interstitial Lung Disease, L1-S1 fusion in 2020 @[facility]. Was worked up for severe, worsening low back pain, worse with ambulation. 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 Now s/p revision T10-L2 fusion, laminectomy T2/L1 (8/12).      M40.205    Handoff    MEWS Score    Diabetes    BPH (benign prostatic hypertrophy)    Hyperlipidemia    Gout    HTN (hypertension)    CAD (coronary artery disease)    Chronic kidney disease (CKD)    Back pain    Deep vein thrombosis (DVT)    H/O kyphosis    CRI (chronic renal insufficiency)    History of DVT in adulthood    Venous insufficiency    H/O: depression    H/O edema    Chronic pain syndrome    PAF (paroxysmal atrial fibrillation)    History of TIAs    HTN (hypertension)    CAD (coronary artery disease)    Lumbar pseudoarthrosis    Lumbar pseudoarthrosis    Major depression    Depression    Revision, fusion, spine, thoracic    H/O heart artery stent    H/O shoulder surgery    History of back surgery    H/O neck surgery    H/O total knee replacement    PAF (paroxysmal atrial fibrillation)    Room Service Assist    SysAdmin_VstLnk        PLAN:  Neuro:  - Neuro/vitals q4hr  - Pain control: oxy 10/15, methadone 2.5q8h, robaxin, tylenol standing, hold home buprenorphine,   - HMV x1, EZEKIEL x1, per plastics  - pain managament following  - Insomnia: home zolpidem 5mg qhs, consider changing to Mirtazapine 15mg QD for insomnia/depression (per Behavioral [facility])   - pending postop standing XRs    Cardio:  - SBP <160  - EKG 8/12: 1st degree heart block  - Hx cardiac stents: holding home ASA, plavix   - HLD: atorvastatin 80mg qd    Pulm:  - RA  - ILD: continue home Symbicort     GI:  - Regular  - Bowel regimen  - Last BM: pend     Renal:  - IVL  - uretral stricture, hematura due to patient tugging on rothman --> rothman removed 8/13, pedn TOV   - CKD: trend Cr   - BPH: home finaseteride, flomax  - Gout: allopurinol 100mg daily    Heme:  - SCDs for dvt ppx  - SQH TID tonight  - hx b/l LE DVTs: pending LE duplex    ID:  - postop ancef  - afebrile    Dispo: Stepdown, full code, PT/OT pending AR    D/w Dr Hernandez

## 2024-08-14 NOTE — CONSULT NOTE ADULT - ASSESSMENT
85 y/o male with hx HTN, HLD, BPH, T2DM (Hgba1c 6.7), CAD s/p remote stent to LAD (on Plavix), DVT b/l LEs in 4/23 with + Cardiolipin, CKD, Nephrolithiasis, Gout, Interstitial Lung Disease, L1-S1 fusion in 2020 @[facility] who was worked up for severe, worsening low back pain, worse with ambulation. 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 Now s/p revision T10-L2 fusion, laminectomy T2/L1 (8/12)    #Severe lumbar degenerative disease  #post op state  - s/p revision T10-L2 fusion, laminectomy T2/L1 (8/12)  - pain control per primary team and pain management  - hx of constipation with PRN lactulose use, c/w aggressive bowel regimen  - PT/OT eval - AR  - DVT ppx: Hep sub Q  - encourage incentive spirometer, OOB as tolerated  - hold home ambien to avoid falls    #CAD with remote PCI  - resume Plavix when safe from surgical standpoint  - c/w Lipitor 80mg    #DM  - well controlled, A1c < 7  - mISS    #Hx of B/l DVT  #Cardiolipin +  - DVTs in 4/23  - has been on Eliquis 5mg BID  - f/u repeat dopplers  - collateral on cardiolipin, DOAC held currently but possibly should be on Warfarin, was referred to heme but has not seen them yet    #Depression  - BH following, appreciate recs. C/w mirtazepine 15mg, started this admit    #HLD  - c/w Lipitor 80    #HTN  - Previously was on Toprol 25mg, Enalapril 5mg, Amlodipine 5mg but having episodes of hypotension.  Was considered for ambulatory BP monitoring  - also was on Lasix 80mg BID  - currently holding will resume as needed/tolerate    #BPH with urinary retention  - rothman removed  - c/w Finasteride and Flomax    #ILD  - was referred to pulm  - c/w Symbicort    #Gout  - c/w home allopurinol    #CKD  - stable Cr around 1.8  - follows with Dr Araujo  - please check phos on AM    Dispo: AR

## 2024-08-15 PROCEDURE — 72082 X-RAY EXAM ENTIRE SPI 2/3 VW: CPT | Mod: 26

## 2024-08-15 PROCEDURE — 99233 SBSQ HOSP IP/OBS HIGH 50: CPT

## 2024-08-15 PROCEDURE — 99024 POSTOP FOLLOW-UP VISIT: CPT

## 2024-08-15 RX ADMIN — ACETAMINOPHEN 1000 MILLIGRAM(S): 325 TABLET ORAL at 16:30

## 2024-08-15 RX ADMIN — Medication 5000 UNIT(S): at 06:09

## 2024-08-15 RX ADMIN — OXYCODONE HYDROCHLORIDE 10 MILLIGRAM(S): 5 TABLET ORAL at 06:21

## 2024-08-15 RX ADMIN — OXYCODONE HYDROCHLORIDE 5 MILLIGRAM(S): 5 TABLET ORAL at 21:48

## 2024-08-15 RX ADMIN — Medication 5000 UNIT(S): at 21:47

## 2024-08-15 RX ADMIN — ACETAMINOPHEN 1000 MILLIGRAM(S): 325 TABLET ORAL at 23:43

## 2024-08-15 RX ADMIN — Medication 2 TABLET(S): at 21:48

## 2024-08-15 RX ADMIN — Medication 1 PATCH: at 21:48

## 2024-08-15 RX ADMIN — Medication 1 PATCH: at 07:41

## 2024-08-15 RX ADMIN — METHOCARBAMOL 500 MILLIGRAM(S): 750 TABLET, FILM COATED ORAL at 06:08

## 2024-08-15 RX ADMIN — Medication 1 PATCH: at 07:42

## 2024-08-15 RX ADMIN — METHOCARBAMOL 500 MILLIGRAM(S): 750 TABLET, FILM COATED ORAL at 23:43

## 2024-08-15 RX ADMIN — ACETAMINOPHEN 1000 MILLIGRAM(S): 325 TABLET ORAL at 06:08

## 2024-08-15 RX ADMIN — METHOCARBAMOL 500 MILLIGRAM(S): 750 TABLET, FILM COATED ORAL at 15:30

## 2024-08-15 RX ADMIN — Medication 15 MILLIGRAM(S): at 06:08

## 2024-08-15 RX ADMIN — Medication 80 MILLIGRAM(S): at 21:48

## 2024-08-15 RX ADMIN — Medication 100 MILLIGRAM(S): at 06:08

## 2024-08-15 RX ADMIN — OXYCODONE HYDROCHLORIDE 5 MILLIGRAM(S): 5 TABLET ORAL at 17:26

## 2024-08-15 RX ADMIN — OXYCODONE HYDROCHLORIDE 5 MILLIGRAM(S): 5 TABLET ORAL at 22:50

## 2024-08-15 RX ADMIN — Medication 1 PATCH: at 23:38

## 2024-08-15 RX ADMIN — ACETAMINOPHEN 1000 MILLIGRAM(S): 325 TABLET ORAL at 15:30

## 2024-08-15 RX ADMIN — ACETAMINOPHEN 1000 MILLIGRAM(S): 325 TABLET ORAL at 07:08

## 2024-08-15 RX ADMIN — Medication 5000 UNIT(S): at 15:29

## 2024-08-15 RX ADMIN — TAMSULOSIN HYDROCHLORIDE 0.4 MILLIGRAM(S): 0.4 CAPSULE ORAL at 21:48

## 2024-08-15 RX ADMIN — OXYCODONE HYDROCHLORIDE 10 MILLIGRAM(S): 5 TABLET ORAL at 07:21

## 2024-08-15 NOTE — PROGRESS NOTE ADULT - SUBJECTIVE AND OBJECTIVE BOX
HPI:  83 y/o male with hx HTN, HLD, BPH,  T2DM (Hgba1c 6.7), CAD s/p stent~ 4 yrs ago (on Plavix), +antiphospholipid antibody w history of B/L LE DVT was on elliquis (4/2023 dx), CKD, Nephrolithiasis, Gout, Interstitial Lung Disease, L1-S1 fusion in 2020 @[facility].   Hx lower back pain radiating to right buttocks. He denies pain radiating to the lower extremities. Currently he can walk for approximately 1 block until the pain becomes debilitating and he has to stop. He also finds himself hunched over when ambulating. 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   He denies any urinary/bowel dysfunction, saddle anesthesia. Today patient also reports that he did not stop the suboxone as instructed (was supposed to stop 3 days preop) because he didn't want to take oxycodone.   (12 Aug 2024 06:34)    OVERNIGHT EVENTS: TRANG    Hospital Course:   8/12: POD 0 s/p revision T10-L2 fusion, laminectomy T2/L1. DC precedex for bradycardia to 40s. Zofran DC'd for borderline QTc. Atropine @ bedside for 1st deg heart block. Warmed IVF, bearhugger for hypothermia 90F w/ improvement. DC'd ketamine gtt for somnolence.   8/13: POD 1 revision T10-L2 fusion, T2/L1 lami. TRANG overnight. 500cc bolus for soft SBP. hematuria due pt pulling on rothman.   8/14: POD2 s/p revision T10-L2 fusion, T2/L1 lami. Significant pain overnight, continued Oxy 10/15mg PRN, methadone 2.5mg q8hrs, given ambien and Xanax PRN overnight for anxiety, CP w/u with EKG stable, HR stable and AL interval improved. Cont to f/u BH recs. Pain mgmt following, need adjustment in pain recs. Pend postop xrays and LE dopplers. Pend AR. Dc'd methadone per pain recs. LE dopplers negative  8/15: POD3. TRANG o/n    Vital Signs Last 24 Hrs  T(C): 36.7 (14 Aug 2024 22:00), Max: 37.1 (14 Aug 2024 14:39)  T(F): 98 (14 Aug 2024 22:00), Max: 98.7 (14 Aug 2024 14:39)  HR: 92 (14 Aug 2024 16:15) (68 - 92)  BP: 136/67 (14 Aug 2024 16:15) (136/67 - 149/71)  BP(mean): 95 (14 Aug 2024 16:15) (92 - 95)  RR: 17 (14 Aug 2024 16:15) (16 - 18)  SpO2: 94% (14 Aug 2024 16:15) (94% - 98%)    Parameters below as of 14 Aug 2024 16:15  Patient On (Oxygen Delivery Method): room air        I&O's Summary    13 Aug 2024 07:01  -  14 Aug 2024 07:00  --------------------------------------------------------  IN: 540 mL / OUT: 575 mL / NET: -35 mL    14 Aug 2024 07:01  -  15 Aug 2024 00:37  --------------------------------------------------------  IN: 0 mL / OUT: 395 mL / NET: -395 mL        PHYSICAL EXAM:  GEN: laying in bed, NAD  NEURO: AOx3. FC, OE spont, speech intact, face symmetric. CNII-XII intact. PERRL, EOMI. No pronator drift. MAEx4. 5/5 strength throughout. SILT  CV: RRR +S1/S2  PULM: CTAB  GI: Abd soft, NT/ND  EXT: ext warm, dry, nontender  WOUND: thoracolumbar incision c/d/i w/ aquacel dressing    TUBES/LINES:  [] Rothman  [] Lumbar Drain  [x] Wound Drains: HMVx1, JPx1  [] Others      DIET:  [] NPO  [x] Mechanical  [] Tube feeds    LABS:                        10.2   10.62 )-----------( 172      ( 13 Aug 2024 05:30 )             31.5     08-13    139  |  109<H>  |  41<H>  ----------------------------<  113<H>  4.6   |  21<L>  |  1.79<H>    Ca    9.6      13 Aug 2024 05:30  Phos  4.1     08-13  Mg     2.1     08-13        Urinalysis Basic - ( 13 Aug 2024 05:30 )    Color: x / Appearance: x / SG: x / pH: x  Gluc: 113 mg/dL / Ketone: x  / Bili: x / Urobili: x   Blood: x / Protein: x / Nitrite: x   Leuk Esterase: x / RBC: x / WBC x   Sq Epi: x / Non Sq Epi: x / Bacteria: x          CAPILLARY BLOOD GLUCOSE          Drug Levels: [] N/A    CSF Analysis: [] N/A      Allergies    latex (Blisters)  No Known Drug Allergies    Intolerances      MEDICATIONS:  Antibiotics:    Neuro:  acetaminophen     Tablet .. 1000 milliGRAM(s) Oral every 8 hours  gabapentin 100 milliGRAM(s) Oral three times a day  methocarbamol 500 milliGRAM(s) Oral every 8 hours  mirtazapine 15 milliGRAM(s) Oral at bedtime  oxyCODONE    IR 5 milliGRAM(s) Oral every 4 hours PRN  oxyCODONE    IR 10 milliGRAM(s) Oral every 4 hours PRN    Anticoagulation:  heparin   Injectable 5000 Unit(s) SubCutaneous every 8 hours    OTHER:  allopurinol 100 milliGRAM(s) Oral daily  atorvastatin 80 milliGRAM(s) Oral at bedtime  atropine Injectable 1 milliGRAM(s) IntraMuscular once PRN  benzocaine/menthol Lozenge 1 Lozenge Oral four times a day PRN  budesonide 160 MICROgram(s)/formoterol 4.5 MICROgram(s) Inhaler 2 Puff(s) Inhalation once  finasteride 5 milliGRAM(s) Oral daily  insulin lispro (ADMELOG) corrective regimen sliding scale   SubCutaneous every 6 hours  lidocaine   4% Patch 1 Patch Transdermal every 24 hours  lidocaine   4% Patch 1 Patch Transdermal every 24 hours  naloxone Injectable 0.4 milliGRAM(s) IV Push once PRN  polyethylene glycol 3350 17 Gram(s) Oral daily  senna 2 Tablet(s) Oral at bedtime  tamsulosin 0.4 milliGRAM(s) Oral at bedtime    IVF:  multivitamin 1 Tablet(s) Oral daily    CULTURES:    RADIOLOGY & ADDITIONAL TESTS:      ASSESSMENT:  83 y/o male with hx HTN, HLD, BPH,  T2DM (Hgba1c 6.7), CAD s/p stent about 4 yrs ago (on Plavix), +antiphospholipid antibody w history of B/L LE DVT was on Xarelto (4/2023 dx), CKD, Nephrolithiasis, Gout, Interstitial Lung Disease, L1-S1 fusion in 2020 @[facility]. Was worked up for severe, worsening low back pain, worse with ambulation. 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 Now s/p revision T10-L2 fusion, laminectomy T2/L1 (8/12).    M40.205    Handoff    MEWS Score    Diabetes    BPH (benign prostatic hypertrophy)    Hyperlipidemia    Gout    HTN (hypertension)    CAD (coronary artery disease)    Chronic kidney disease (CKD)    Back pain    Deep vein thrombosis (DVT)    H/O kyphosis    CRI (chronic renal insufficiency)    History of DVT in adulthood    Venous insufficiency    H/O: depression    H/O edema    Chronic pain syndrome    PAF (paroxysmal atrial fibrillation)    History of TIAs    HTN (hypertension)    CAD (coronary artery disease)    Lumbar pseudoarthrosis    Lumbar pseudoarthrosis    Major depression    Depression    Revision, fusion, spine, thoracic    H/O heart artery stent    H/O shoulder surgery    History of back surgery    H/O neck surgery    H/O total knee replacement    PAF (paroxysmal atrial fibrillation)    Room Service Assist    SysAdmin_VstLnk        PLAN:  Neuro:  - Neuro/vitals q4hr  - Pain control: oxy 5/10, tylenol standing, robaxin 500q8, gabapentin 100q8, lidocaine patches (holding home buprenorphine)  - pain mgmt following  - HMV x1, EZEKIEL x1, per plastics  - Insomnia: Mirtazapine 15mg QD for insomnia/depression (per psych)   - pending postop standing XRs    Cardio:  - SBP <160  - EKG 8/12: 1st degree heart block  - Hx cardiac stents: holding home ASA, plavix   - HLD: atorvastatin 80mg qd    Pulm:  - RA  - ILD: continue home Symbicort     GI:  - Regular  - Bowel regimen  - Last BM: pend     Renal:  - IVL  - uretral stricture, hematura due to patient tugging on rothman (rothman d/c 8/13)  - retaining, pt/family refusing SC (risks explained)  - CKD: trend Cr   - BPH: home finaseteride, flomax  - Gout: allopurinol 100mg daily    Heme:  - DVT ppx: SCDs, SQH  - hx b/l LE DVTs: LE dopplers 8/14, f/u read    ID:  - afebrile    Dispo: Stepdown, full code, pending AR    D/w Dr Hernandez    Assessment:  Present when checked    []  GCS  E   V  M     Heart Failure: []Acute, [] acute on chronic , []chronic  Heart Failure:  [] Diastolic (HFpEF), [] Systolic (HFrEF), []Combined (HFpEF and HFrEF), [] RHF, [] Pulm HTN, [] Other    [] JONH, [] ATN, [] AIN, [] other  [] CKD1, [] CKD2, [] CKD 3, [] CKD 4, [] CKD 5, []ESRD    Encephalopathy: [] Metabolic, [] Hepatic, [] toxic, [] Neurological, [] Other    Abnormal Nurtitional Status: [] malnurtition (see nutrition note), [ ]underweight: BMI < 19, [] morbid obesity: BMI >40, [] Cachexia    [] Sepsis  [] hypovolemic shock,[] cardiogenic shock, [] hemorrhagic shock, [] neuogenic shock  [] Acute Respiratory Failure  []Cerebral edema, [] Brain compression/ herniation,   [] Functional quadriplegia  [] Acute blood loss anemia

## 2024-08-15 NOTE — DIETITIAN INITIAL EVALUATION ADULT - OTHER CALCULATIONS
Estimated needs based on dosing wt as within % IBW 160lb/72.7kg (96%). Needs adjusted for age and post-op healing.

## 2024-08-15 NOTE — DIETITIAN INITIAL EVALUATION ADULT - ADD RECOMMEND
1. Continue Consistent Carbohydrate diet.   >>Encourage & monitor PO intake. [place] dietary preferences as able.   2. Continue micronutrient supplementation.   3. Monitor GI tolerance, weight trends, labs, & skin integrity.  4. Defer bowel and pain regimens to team.   5. RD to remain available for diet education/intervention prn.

## 2024-08-15 NOTE — CHART NOTE - NSCHARTNOTEFT_GEN_A_CORE
Patient having consistent PVR >500 cc. Patient refusing straight catheterization. It was explained to the patient and his family the risks of urinary retention including UTI/urosepsis, hydronephrosis. Patient understands and accepts these risks.     Patient complaining of L lateral leg pain. b/l LE dopplers 8/14 negative for DVT. Patient refusing any type of pain medication (tylenol, oxycodone, dilaudid, toradol, gabapentin, lidocaine patches were all offered), refusing increased doses of his current medications, refusing any conservative intervention (heat/ice pack), and refusing xray of leg. Patient having consistent PVR >500 cc. Patient refusing straight catheterization. It was explained to the patient and his family the risks of urinary retention including UTI/urosepsis, hydronephrosis. Patient understands and accepts these risks.     Patient complaining of burning L lateral leg pain. Leg tender to palpation, no erythema and no visible or palpable edema. b/l LE dopplers 8/14 negative for DVT. Patient refusing any type of pain medication (tylenol, oxycodone, dilaudid, toradol, gabapentin, lidocaine patches were all offered), refusing increased doses of his current medications, refusing any conservative intervention (heat/ice pack), and refusing xray of leg.

## 2024-08-15 NOTE — PROGRESS NOTE ADULT - SUBJECTIVE AND OBJECTIVE BOX
PAIN MANAGEMENT CONSULT NOTE    Interval Events:  -       Since yesterday 6am, pt has required:    PCA Setting:   - Opioids:   - Initial Bolus:   - Initial Demand:   - Lockout:   - Continuous Rate:   - 4 hour limit:       HOME MEDICATIONS:   Please refer to initial HNP    Allergies    latex (Blisters)  No Known Drug Allergies    Intolerances        PAIN MEDICATIONS:  acetaminophen     Tablet .. 1000 milliGRAM(s) Oral every 8 hours  gabapentin 100 milliGRAM(s) Oral three times a day  methocarbamol 500 milliGRAM(s) Oral every 8 hours  mirtazapine 15 milliGRAM(s) Oral at bedtime  oxyCODONE    IR 5 milliGRAM(s) Oral every 4 hours PRN  oxyCODONE    IR 10 milliGRAM(s) Oral every 4 hours PRN    Heme:  heparin   Injectable 5000 Unit(s) SubCutaneous every 8 hours    Antibiotics:    Cardiovascular:    GI:  atropine Injectable 1 milliGRAM(s) IntraMuscular once PRN  polyethylene glycol 3350 17 Gram(s) Oral daily  senna 2 Tablet(s) Oral at bedtime    Endocrine:  allopurinol 100 milliGRAM(s) Oral daily  atorvastatin 80 milliGRAM(s) Oral at bedtime  finasteride 5 milliGRAM(s) Oral daily  insulin lispro (ADMELOG) corrective regimen sliding scale   SubCutaneous every 6 hours    All Other Medications:  benzocaine/menthol Lozenge 1 Lozenge Oral four times a day PRN  lidocaine   4% Patch 1 Patch Transdermal every 24 hours  lidocaine   4% Patch 1 Patch Transdermal every 24 hours  multivitamin 1 Tablet(s) Oral daily  naloxone Injectable 0.4 milliGRAM(s) IV Push once PRN  tamsulosin 0.4 milliGRAM(s) Oral at bedtime      Vital Signs Last 24 Hrs  T(C): 36.7 (14 Aug 2024 22:00), Max: 37.1 (14 Aug 2024 14:39)  T(F): 98 (14 Aug 2024 22:00), Max: 98.7 (14 Aug 2024 14:39)  HR: 71 (15 Aug 2024 05:37) (71 - 92)  BP: 141/71 (15 Aug 2024 05:37) (121/60 - 141/71)  BP(mean): 100 (15 Aug 2024 05:37) (86 - 100)  RR: 18 (15 Aug 2024 05:37) (17 - 18)  SpO2: 97% (15 Aug 2024 05:37) (94% - 97%)    Parameters below as of 15 Aug 2024 05:37  Patient On (Oxygen Delivery Method): room air        LABS:                RADIOLOGY:    Drug Screen:        REVIEW OF SYSTEMS:  CONSTITUTIONAL: Denies fever or fatigue   EYES: Denies eye pain, visual disturbances  HEENT: Denies difficulty hearing, throat/neck pain or stiffness  RESPIRATORY: Denies SOB, cough, wheezing  CARDIOVASCULAR: Denies chest pain, palpitations.   GASTROINTESTINAL: Endorses +flatus, BMs. Denies nausea, vomiting, abdominal or epigastric pain.   GENITOURINARY: Denies dysuria, frequency, or incontinence  NEUROLOGICAL: Endorses *** numbness, tingling. Denies headaches, loss of strength, tremors, dizziness or lightheadedness with pain medications.   MUSCULOSKELETAL: Denies joint pain or swelling      FUNCTIONAL ASSESSMENT:  PAIN SCORE AT REST:         SCALE USED: (1-10 VNRS)  PAIN SCORE WITH ACTIVITY:         SCALE USED: (1-10 VNRS)    PAIN ASSESSMENT:    FOCUSED PHYSICAL EXAM  GENERAL: Laying in bed, NAD  NEURO: CN II-XII grossly intact, EOMI  PULM: unlabored  CV: Regular rate and rhythm  ABDOMEN: Soft, Nontender, Nondistended  EXTREMITIES:  2+ Peripheral Pulses, No clubbing, cyanosis, or edema  SKIN: No rashes or lesions      ASSESSMENT:  85 y/o male with hx HTN, HLD, BPH,  T2DM (Hgba1c 6.7), CAD s/p stent about 4 yrs ago (on Plavix), +antiphospholipid antibody w history of B/L LE DVT was on Xarelto (4/2023 dx), CKD, Nephrolithiasis, Gout, Interstitial Lung Disease, L1-S1 fusion in 2020 @[facility]. Was worked up for severe, worsening low back pain, worse with ambulation. 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 Now s/p revision T10-L2 fusion, laminectomy T2/L1 (8/12).      PLAN:   - continue tylenol 1gm q8h  - continue robaxin 500mg q8h  - can hold home suboxone 2mg BID  - continue oxycodone to 5-10mg q4h prn moderate-severe pain  - monitor closely for oversedation, ensure narcan is ordered  - escalate bowel regimen as needed for bowel movement daily  ***recs not yet finalized***    - Bowel regimen: Senna, miralax   - Nausea ppx: Zofran as needed  - Functional Goals: Pt will get OOB with PT today. Pt will resume previous level of activity without impairment from surgery.   - Additional Consults: None recommended.   - Additional Labs/Imaging:  None recommended.     - Discharge Planning: per primary team  - Pain Management follow up plan: will continue to follow    Plan d/w Dr. Samuel.     Ruthie Harrison NP  Acute Pain Service PAIN MANAGEMENT CONSULT NOTE    Interval Events:  - pt drowsy this AM, reportedly refusing several interventions overnight      Since yesterday 6am, pt has required:  - oxycodone 10mg x 3      HOME MEDICATIONS:   Please refer to initial HNP    Allergies    latex (Blisters)  No Known Drug Allergies    Intolerances        PAIN MEDICATIONS:  acetaminophen     Tablet .. 1000 milliGRAM(s) Oral every 8 hours  gabapentin 100 milliGRAM(s) Oral three times a day  methocarbamol 500 milliGRAM(s) Oral every 8 hours  mirtazapine 15 milliGRAM(s) Oral at bedtime  oxyCODONE    IR 5 milliGRAM(s) Oral every 4 hours PRN  oxyCODONE    IR 10 milliGRAM(s) Oral every 4 hours PRN    Heme:  heparin   Injectable 5000 Unit(s) SubCutaneous every 8 hours    Antibiotics:    Cardiovascular:    GI:  atropine Injectable 1 milliGRAM(s) IntraMuscular once PRN  polyethylene glycol 3350 17 Gram(s) Oral daily  senna 2 Tablet(s) Oral at bedtime    Endocrine:  allopurinol 100 milliGRAM(s) Oral daily  atorvastatin 80 milliGRAM(s) Oral at bedtime  finasteride 5 milliGRAM(s) Oral daily  insulin lispro (ADMELOG) corrective regimen sliding scale   SubCutaneous every 6 hours    All Other Medications:  benzocaine/menthol Lozenge 1 Lozenge Oral four times a day PRN  lidocaine   4% Patch 1 Patch Transdermal every 24 hours  lidocaine   4% Patch 1 Patch Transdermal every 24 hours  multivitamin 1 Tablet(s) Oral daily  naloxone Injectable 0.4 milliGRAM(s) IV Push once PRN  tamsulosin 0.4 milliGRAM(s) Oral at bedtime      Vital Signs Last 24 Hrs  T(C): 36.7 (14 Aug 2024 22:00), Max: 37.1 (14 Aug 2024 14:39)  T(F): 98 (14 Aug 2024 22:00), Max: 98.7 (14 Aug 2024 14:39)  HR: 71 (15 Aug 2024 05:37) (71 - 92)  BP: 141/71 (15 Aug 2024 05:37) (121/60 - 141/71)  BP(mean): 100 (15 Aug 2024 05:37) (86 - 100)  RR: 18 (15 Aug 2024 05:37) (17 - 18)  SpO2: 97% (15 Aug 2024 05:37) (94% - 97%)    Parameters below as of 15 Aug 2024 05:37  Patient On (Oxygen Delivery Method): room air        LABS:                RADIOLOGY:    Drug Screen:        REVIEW OF SYSTEMS:  LIZ 2/2 mental status      FUNCTIONAL ASSESSMENT:  PAIN SCORE AT REST:         SCALE USED: (1-10 VNRS)  PAIN SCORE WITH ACTIVITY:         SCALE USED: (1-10 VNRS)    PAIN ASSESSMENT:    FOCUSED PHYSICAL EXAM  GENERAL: sitting on side of bed, NAD  NEURO:  answering some yes or no questions but is drowsy  PULM: unlabored  CV: Regular rate and rhythm  ABDOMEN: Soft, Nontender, Nondistended  EXTREMITIES:  2+ Peripheral Pulses, No clubbing, cyanosis, or edema  SKIN: No rashes or lesions      ASSESSMENT:  83 y/o male with hx HTN, HLD, BPH,  T2DM (Hgba1c 6.7), CAD s/p stent about 4 yrs ago (on Plavix), +antiphospholipid antibody w history of B/L LE DVT was on Xarelto (4/2023 dx), CKD, Nephrolithiasis, Gout, Interstitial Lung Disease, L1-S1 fusion in 2020 @[facility]. Was worked up for severe, worsening low back pain, worse with ambulation. 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 Now s/p revision T10-L2 fusion, laminectomy T2/L1 (8/12).      PLAN:   - continue tylenol 1gm q8h  - continue robaxin 500mg q8h\  - dc gabapentin 2/2 mental status  - can hold home suboxone 2mg BID  - dc oxycodone 10mg, continue 5mg q4h prn pain pending improvement of mental status  - monitor closely for oversedation, ensure narcan is ordered  - escalate bowel regimen as needed for bowel movement daily    - Bowel regimen: Senna, miralax   - Nausea ppx: Zofran as needed  - Functional Goals: Pt will get OOB with PT today. Pt will resume previous level of activity without impairment from surgery.   - Additional Consults: None recommended.   - Additional Labs/Imaging:  None recommended.     - Discharge Planning: per primary team  - Pain Management follow up plan: will continue to follow    Plan d/w Dr. Samuel.     Ruthie Harrison NP  Acute Pain Service

## 2024-08-15 NOTE — DIETITIAN INITIAL EVALUATION ADULT - EDUCATION DIETARY MODIFICATIONS
Educated on importance of continued adequate kcal/protein intake to promote post-op healing and ADLs, discussed protein dense foods to incorporate. Pt's son aware RD remains available for additional questions/concerns./(2) meets goals/outcomes/verbalization I have reviewed and confirmed nurses' notes for patient's medications, allergies, medical history, and surgical history.

## 2024-08-15 NOTE — DIETITIAN INITIAL EVALUATION ADULT - PERTINENT MEDS FT
MEDICATIONS  (STANDING):  acetaminophen     Tablet .. 1000 milliGRAM(s) Oral every 8 hours  allopurinol 100 milliGRAM(s) Oral daily  atorvastatin 80 milliGRAM(s) Oral at bedtime  budesonide 160 MICROgram(s)/formoterol 4.5 MICROgram(s) Inhaler 2 Puff(s) Inhalation once  finasteride 5 milliGRAM(s) Oral daily  gabapentin 100 milliGRAM(s) Oral three times a day  heparin   Injectable 5000 Unit(s) SubCutaneous every 8 hours  insulin lispro (ADMELOG) corrective regimen sliding scale   SubCutaneous every 6 hours  lidocaine   4% Patch 1 Patch Transdermal every 24 hours  lidocaine   4% Patch 1 Patch Transdermal every 24 hours  methocarbamol 500 milliGRAM(s) Oral every 8 hours  mirtazapine 15 milliGRAM(s) Oral at bedtime  multivitamin 1 Tablet(s) Oral daily  polyethylene glycol 3350 17 Gram(s) Oral daily  senna 2 Tablet(s) Oral at bedtime  tamsulosin 0.4 milliGRAM(s) Oral at bedtime    MEDICATIONS  (PRN):  atropine Injectable 1 milliGRAM(s) IntraMuscular once PRN Sustianed bradycardia HR less than 35 beats/min  benzocaine/menthol Lozenge 1 Lozenge Oral four times a day PRN Sore Throat  naloxone Injectable 0.4 milliGRAM(s) IV Push once PRN Signs of opioid overdose  oxyCODONE    IR 10 milliGRAM(s) Oral every 4 hours PRN Severe Pain (7 - 10)  oxyCODONE    IR 5 milliGRAM(s) Oral every 4 hours PRN Moderate Pain (4 - 6)

## 2024-08-15 NOTE — DIETITIAN INITIAL EVALUATION ADULT - OTHER INFO
84M with PMH of HTN, HLD, BPH, DM2, CAD (status post stent x4 yrs ago), +antiphospholipid antibody, bilateral LE DVT, CKD, nephrolithiasis, gout, and interstitial lung disease, L1-S1 fusion (2020) with prior work-up for low back pain with imagining showing severe degenerative disease who admitted for extension of fusion to T10. Now status post revision T10-L2 fusion, laminectomy T2/L1 (8/12).    Pt seen on 8LA for assessment. Labs and medication orders reviewed. Ordered for mirtazapine, multivitamin. No updated labs 8/15. On Consistent Carbohydrate diet. Pt asleep on visit, interview deferred to family at bedside. Reports pt with good appetite and intake, diet recall: ~50% oatmeal + 100% fruit plate, 100% pasta with meat sauce, >50% chicken and vegetables. Endorses pt's UBW consistent with admission wt 153lb/69.3kg, notes 86lb wt loss in setting of hospitalization earlier in the year, time frame/previous wt unclear.  84M with PMH of HTN, HLD, BPH, DM2, CAD (status post stent x4 yrs ago), +antiphospholipid antibody, bilateral LE DVT, CKD, nephrolithiasis, gout, and interstitial lung disease, L1-S1 fusion (2020) with prior work-up for low back pain with imagining showing severe degenerative disease who admitted for extension of fusion to T10. Now status post revision T10-L2 fusion, laminectomy T2/L1 (8/12).    Pt seen on 8LA for assessment. Labs and medication orders reviewed. Ordered for mirtazapine, multivitamin. No updated labs 8/15. On Consistent Carbohydrate diet. Pt asleep on visit, interview deferred to family at bedside. Reports pt with good appetite and intake, diet recall: ~50% oatmeal + 100% fruit plate, 100% pasta with meat sauce, >50% chicken and vegetables; estimate pt meeting >/=75% nutrient needs. Endorses pt's UBW consistent with admission wt 153lb/69.3kg, notes >80lb wt loss in setting of hospitalization earlier in the year, specific time frame/wt history unknown. Advanced age related muscle depletions noted, no overt signs nutritional wasting identified at this time. Per ASPEN guidelines, pt does not meet objective criteria for malnutrition. Pt's son denies pt with nausea/vomiting/diarrhea, notes constipation with last BM prior to OR - bowel regimen ordered. Denies pt with difficulty chewing/swallowing. Confirms no known food allergies. RD obtained food preferences - forwarded to Dietary. No pressure injuries or edema documented, surgical incisions noted, Maurizio score 19. See nutrition recommendations. RD to remain available.

## 2024-08-15 NOTE — PROGRESS NOTE ADULT - ASSESSMENT
85 y/o male with hx HTN, HLD, BPH, T2DM (Hgba1c 6.7), CAD s/p remote stent to LAD (on Plavix), DVT b/l LEs in 4/23 with + Cardiolipin, CKD, Nephrolithiasis, Gout, Interstitial Lung Disease, L1-S1 fusion in 2020 @[facility] who was worked up for severe, worsening low back pain, worse with ambulation. 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 Now s/p revision T10-L2 fusion, laminectomy T2/L1 (8/12)    #Severe lumbar degenerative disease  #post op state  - s/p revision T10-L2 fusion, laminectomy T2/L1 (8/12)  - pain control per primary team and pain management  - hx of constipation with PRN lactulose use, c/w aggressive bowel regimen  - PT/OT eval - AR  - DVT ppx: Hep sub Q  - encourage incentive spirometer, OOB as tolerated  - hold home ambien to avoid falls    #CAD with remote PCI  - resume Plavix when safe from surgical standpoint  - c/w Lipitor 80mg    #DM  - well controlled, A1c < 7  - mISS    #Hx of B/l DVT  #Cardiolipin +  - DVTs in 4/23  - has been on Eliquis 5mg BID  - dopplers negative  - Charts reviewed, anticardiolipin antibody + in April 2023 after dx with DVTs, was on DOAC at the time of testing. Possibly false positive.  Has been recommended for heme referral since then but has not established or had repeat testing, DOAC held currently but possibly should be on Warfarin.  Would repeat testing now that he is off DOAC but patient refusing all blood work at this time, will attempt tomorrow    #Depression  - BH following, appreciate recs. C/w mirtazepine 15mg, started this admit    #HLD  - c/w Lipitor 80    #HTN  - Previously was on Toprol 25mg, Enalapril 5mg, Amlodipine 5mg but having episodes of hypotension.  Was considered for ambulatory BP monitoring  - also was on Lasix 80mg BID  - currently holding will resume as needed/tolerate    #BPH with urinary retention  - rothman removed  - c/w Finasteride and Flomax  - having retention with > 500cc post void residuals, refusing straight cath interventions. Understands potential adverse outcomes such as hydronephrosis, worsening renal disease, constipation    #ILD  - was referred to pulm  - c/w Symbicort    #Gout  - c/w home allopurinol    #CKD  - stable Cr around 1.8  - follows with Dr Araujo  - please check phos when patient allows labs to be repeated    Dispo: AR

## 2024-08-15 NOTE — PROGRESS NOTE ADULT - SUBJECTIVE AND OBJECTIVE BOX
Patient is a 84y old  Male who presents with a chief complaint of M40.205     (15 Aug 2024 10:21)      SUBJECTIVE:  Patient seen and examined at bedside.  Reports low back pain is worse today than yesterday. Also has a burning tingling pain of LLE this AM that has since improved.  Having urinary retention but refusing straight cath despite education by multiple providers and risks explained. Also refusing all blood work today    ROS:  Denies fevers, chills, cough, SOB, chest pain, Abdominal pain, N/V.  All other ROS negative except as above    Vital Signs Last 12 Hrs  T(F): 97.2 (08-15-24 @ 12:44), Max: 98.3 (08-15-24 @ 09:57)  HR: 72 (08-15-24 @ 11:00) (64 - 104)  BP: 143/65 (08-15-24 @ 11:00) (141/71 - 170/70)  BP(mean): 93 (08-15-24 @ 11:00) (93 - 102)  RR: 17 (08-15-24 @ 11:00) (17 - 18)  SpO2: 95% (08-15-24 @ 11:00) (95% - 98%)  I&O's Summary    14 Aug 2024 07:01  -  15 Aug 2024 07:00  --------------------------------------------------------  IN: 0 mL / OUT: 1070 mL / NET: -1070 mL    15 Aug 2024 07:01  -  15 Aug 2024 14:27  --------------------------------------------------------  IN: 325 mL / OUT: 115 mL / NET: 210 mL        PHYSICAL EXAM:  Constitutional: NAD, elderly male  HEENT: EOMI, no conjunctival pallor or scleral icterus, MMM  Neck: Supple  Respiratory: Normal chest rise and WOB  Cardiovascular: RRR   Gastrointestinal: soft NTND   Extremities: wwp; no cyanosis or clubbing. + trace b/l LE pitting edema.  Neurological: AAOx3, LE strength limited by pain   Skin: No gross skin abnormalities or rashes.  Back dressing c/d/i. HMV and EZEKIEL with serosanguinous drainage      LABS:  No new labs      RADIOLOGY & ADDITIONAL TESTS:  < from: Xray Spine Entire Thoracolumbar 2 or 3 Views (08.15.24 @ 10:52) >  IMPRESSION:  Augmented prior multilevel spinal fusion, currently up to T10 level.   Intact remaining pre-existing indwelling spinal fusion hardware.     Unchanged vertebral body and disc space heights and alignment.   Exaggerated upper thoracic kyphosis.    Generalized osteopenia otherwise no discrete suspicious lytic or blastic   lesions.    Cortical overlies lower anterior left hemithorax.    < end of copied text >      MEDICATIONS  (STANDING):  acetaminophen     Tablet .. 1000 milliGRAM(s) Oral every 8 hours  allopurinol 100 milliGRAM(s) Oral daily  atorvastatin 80 milliGRAM(s) Oral at bedtime  budesonide 160 MICROgram(s)/formoterol 4.5 MICROgram(s) Inhaler 2 Puff(s) Inhalation once  finasteride 5 milliGRAM(s) Oral daily  heparin   Injectable 5000 Unit(s) SubCutaneous every 8 hours  insulin lispro (ADMELOG) corrective regimen sliding scale   SubCutaneous every 6 hours  lidocaine   4% Patch 1 Patch Transdermal every 24 hours  lidocaine   4% Patch 1 Patch Transdermal every 24 hours  methocarbamol 500 milliGRAM(s) Oral every 8 hours  mirtazapine 15 milliGRAM(s) Oral at bedtime  multivitamin 1 Tablet(s) Oral daily  polyethylene glycol 3350 17 Gram(s) Oral daily  senna 2 Tablet(s) Oral at bedtime  tamsulosin 0.4 milliGRAM(s) Oral at bedtime    MEDICATIONS  (PRN):  atropine Injectable 1 milliGRAM(s) IntraMuscular once PRN Sustianed bradycardia HR less than 35 beats/min  benzocaine/menthol Lozenge 1 Lozenge Oral four times a day PRN Sore Throat  naloxone Injectable 0.4 milliGRAM(s) IV Push once PRN Signs of opioid overdose  oxyCODONE    IR 5 milliGRAM(s) Oral every 4 hours PRN Moderate Pain (4 - 6)

## 2024-08-16 ENCOUNTER — TRANSCRIPTION ENCOUNTER (OUTPATIENT)
Age: 84
End: 2024-08-16

## 2024-08-16 PROCEDURE — 99024 POSTOP FOLLOW-UP VISIT: CPT

## 2024-08-16 PROCEDURE — 99233 SBSQ HOSP IP/OBS HIGH 50: CPT

## 2024-08-16 RX ORDER — LACTULOSE 10 G
10 PACKET (EA) ORAL ONCE
Refills: 0 | Status: COMPLETED | OUTPATIENT
Start: 2024-08-16 | End: 2024-08-16

## 2024-08-16 RX ORDER — POLYETHYLENE GLYCOL 3350 17 G/17G
17 POWDER, FOR SOLUTION ORAL
Refills: 0 | Status: DISCONTINUED | OUTPATIENT
Start: 2024-08-16 | End: 2024-08-20

## 2024-08-16 RX ORDER — OXYCODONE HYDROCHLORIDE 5 MG/1
2.5 TABLET ORAL EVERY 4 HOURS
Refills: 0 | Status: DISCONTINUED | OUTPATIENT
Start: 2024-08-16 | End: 2024-08-17

## 2024-08-16 RX ORDER — ALPRAZOLAM 0.25 MG
0.5 TABLET ORAL ONCE
Refills: 0 | Status: DISCONTINUED | OUTPATIENT
Start: 2024-08-16 | End: 2024-08-16

## 2024-08-16 RX ORDER — OXYCODONE HYDROCHLORIDE 5 MG/1
5 TABLET ORAL EVERY 4 HOURS
Refills: 0 | Status: DISCONTINUED | OUTPATIENT
Start: 2024-08-16 | End: 2024-08-17

## 2024-08-16 RX ADMIN — Medication 100 MILLIGRAM(S): at 12:10

## 2024-08-16 RX ADMIN — Medication 1 PATCH: at 07:47

## 2024-08-16 RX ADMIN — METHOCARBAMOL 500 MILLIGRAM(S): 750 TABLET, FILM COATED ORAL at 14:03

## 2024-08-16 RX ADMIN — ACETAMINOPHEN 1000 MILLIGRAM(S): 325 TABLET ORAL at 22:58

## 2024-08-16 RX ADMIN — FINASTERIDE 5 MILLIGRAM(S): 1 TABLET, FILM COATED ORAL at 12:09

## 2024-08-16 RX ADMIN — Medication 1 PATCH: at 07:19

## 2024-08-16 RX ADMIN — POLYETHYLENE GLYCOL 3350 17 GRAM(S): 17 POWDER, FOR SOLUTION ORAL at 22:31

## 2024-08-16 RX ADMIN — Medication 0.5 MILLIGRAM(S): at 22:28

## 2024-08-16 RX ADMIN — OXYCODONE HYDROCHLORIDE 5 MILLIGRAM(S): 5 TABLET ORAL at 13:09

## 2024-08-16 RX ADMIN — METHOCARBAMOL 500 MILLIGRAM(S): 750 TABLET, FILM COATED ORAL at 07:25

## 2024-08-16 RX ADMIN — Medication 1 TABLET(S): at 12:10

## 2024-08-16 RX ADMIN — Medication 15 MILLIGRAM(S): at 22:28

## 2024-08-16 RX ADMIN — Medication 1 PATCH: at 22:29

## 2024-08-16 RX ADMIN — TAMSULOSIN HYDROCHLORIDE 0.4 MILLIGRAM(S): 0.4 CAPSULE ORAL at 22:29

## 2024-08-16 RX ADMIN — POLYETHYLENE GLYCOL 3350 17 GRAM(S): 17 POWDER, FOR SOLUTION ORAL at 12:10

## 2024-08-16 RX ADMIN — ACETAMINOPHEN 1000 MILLIGRAM(S): 325 TABLET ORAL at 07:25

## 2024-08-16 RX ADMIN — Medication 80 MILLIGRAM(S): at 22:29

## 2024-08-16 RX ADMIN — METHOCARBAMOL 500 MILLIGRAM(S): 750 TABLET, FILM COATED ORAL at 22:28

## 2024-08-16 RX ADMIN — ACETAMINOPHEN 1000 MILLIGRAM(S): 325 TABLET ORAL at 00:43

## 2024-08-16 RX ADMIN — Medication 5000 UNIT(S): at 07:24

## 2024-08-16 RX ADMIN — OXYCODONE HYDROCHLORIDE 5 MILLIGRAM(S): 5 TABLET ORAL at 12:09

## 2024-08-16 RX ADMIN — OXYCODONE HYDROCHLORIDE 2.5 MILLIGRAM(S): 5 TABLET ORAL at 20:51

## 2024-08-16 RX ADMIN — Medication 5000 UNIT(S): at 22:28

## 2024-08-16 RX ADMIN — Medication 2 TABLET(S): at 22:28

## 2024-08-16 RX ADMIN — ACETAMINOPHEN 1000 MILLIGRAM(S): 325 TABLET ORAL at 14:03

## 2024-08-16 RX ADMIN — Medication 1 PATCH: at 12:32

## 2024-08-16 RX ADMIN — OXYCODONE HYDROCHLORIDE 2.5 MILLIGRAM(S): 5 TABLET ORAL at 21:51

## 2024-08-16 RX ADMIN — Medication 5000 UNIT(S): at 14:03

## 2024-08-16 RX ADMIN — Medication 1 PATCH: at 09:00

## 2024-08-16 RX ADMIN — Medication 10 GRAM(S): at 22:31

## 2024-08-16 RX ADMIN — ACETAMINOPHEN 1000 MILLIGRAM(S): 325 TABLET ORAL at 22:28

## 2024-08-16 NOTE — DISCHARGE NOTE PROVIDER - NSDCFUADDINST_GEN_ALL_CORE_FT
Neurosurgery follow up appointment date/time:  - are staples/sutures in place?  - what day should staples/sutures be removed (POD 10-14)?  - please call the office to confirm appointment:     Wound Care:  - can patient shower?  - does dressing need to be changed/removed?  - no picking at incision  - wears glasses?   - pressure ulcer?     Devices:  - does patient need collar or brace or helmet?   - does collar/brace need to be worn at all times or just when OOB?  - RW or cane for ambulation?    Drains/Lines:  - PICC in place? ID follow up? (Paper Rx for: antibiotics, heparin flush, weekly lab draws)  - EZEKIEL in place? Management?  - rothman in place? Management/Urology follow up?      Activity:  - fatigue is common after surgery, rest if you feel tired   - no bending, lifting, twisting or heavy lifting   - walking is recommended, ambulate as tolerated  - you may shower when you get home, keep your incision dry  - no soaking in a tub/pool/hot tub   - no driving within 24 hours of anesthesia or while taking prescription pain medications   - keep hydrated, drink plenty of water     Inpatient consults:  - final recommendations  - you will need follow up with....    Please also follow up with your primary care doctor.     Pain Expectations:  - pain after surgery is expected  - please take pain meds as prescribed     Medications:  - changes to home meds (ex. AED's)?  - new meds?  - pain meds?  - when can antiplatelets or anticoagulants be restarted?  - were adverse affects of meds discussed with patients?   - pain medications can cause constipation, you should eat a high fiber diet and may take a stool softener while on pain meds   - Avoid taking Advil (ibuprofen), Motrin (naproxen), or Aspirin for pain as they can cause bleeding     Call the office or come to ED if:  - wound has drainage or bleeding, increased redness or pain at incision site, neurological change, fever (>101), chills, night sweats, syncope, nausea/vomiting, chest pain, shortness of breath      Playback:  - are discharge instruction on playback?  - is a picture of the incision on playback?     WITHIN 24 HOURS OF DISCHARGE, PLEASE CONTACT NEURO PA  WITH ANY QUESTIONS OR CONCERNS: [phone]   OTHERWISE, PLEASE CALL THE OFFICE WITH ANY QUESTIONS OR CONCERNS: [phone] Neurosurgery follow up appointment date/time:  - are staples/sutures in place?  - what day should staples/sutures be removed (POD 10-14)?  - please call the office to confirm appointment: [phone]    Wound Care:  - you can shower when you get home, please do not scrub incision, let soapy water run along incision  - does dressing need to be changed/removed?  - no picking at incision    Devices:  - does patient need collar or brace or helmet?   - does collar/brace need to be worn at all times or just when OOB?  - RW or cane for ambulation?    Drains/Lines:  - PICC in place? ID follow up? (Paper Rx for: antibiotics, heparin flush, weekly lab draws)  - EZEKIEL in place? Management?  - rothman in place? Management/Urology follow up?      Activity:  - fatigue is common after surgery, rest if you feel tired   - no bending, lifting, twisting or heavy lifting   - walking is recommended, ambulate as tolerated  - you may shower when you get home, keep your incision dry  - no soaking in a tub/pool/hot tub   - no driving within 24 hours of anesthesia or while taking prescription pain medications   - keep hydrated, drink plenty of water     Inpatient consults:  - final recommendations  - you will need follow up with....    Please also follow up with your primary care doctor.     Pain Expectations:  - pain after surgery is expected  - please take pain meds as prescribed     Medications:  - changes to home meds (ex. AED's)?  - new meds?  - pain meds?  - when can antiplatelets or anticoagulants be restarted?  - were adverse affects of meds discussed with patients?   - pain medications can cause constipation, you should eat a high fiber diet and may take a stool softener while on pain meds   - Avoid taking Advil (ibuprofen), Motrin (naproxen), or Aspirin for pain as they can cause bleeding     Call the office or come to ED if:  - wound has drainage or bleeding, increased redness or pain at incision site, neurological change, fever (>101), chills, night sweats, syncope, nausea/vomiting, chest pain, shortness of breath      Playback:  - are discharge instruction on playback?  - is a picture of the incision on playback?     WITHIN 24 HOURS OF DISCHARGE, PLEASE CONTACT NEURO PA  WITH ANY QUESTIONS OR CONCERNS: [phone]   OTHERWISE, PLEASE CALL THE OFFICE WITH ANY QUESTIONS OR CONCERNS: [phone] Neurosurgery follow up appointment date/time: 9/6/24, 10:00am  - You have sutures in place. These will be removed at your post op appointment.   - please call the office to confirm appointment: [phone]    Wound Care:  - you can shower when you get home, please do not scrub incision, let soapy water run along incision  - Leave your incision open to air.   - no picking at incision    Activity:  - fatigue is common after surgery, rest if you feel tired   - no bending, lifting, twisting or heavy lifting   - walking is recommended, ambulate as tolerated  - you may shower when you get home, keep your incision dry  - no soaking in a tub/pool/hot tub   - no driving within 24 hours of anesthesia or while taking prescription pain medications   - keep hydrated, drink plenty of water     Inpatient consults:  - You were seen by behavioral health, please follow up with a psychiatrist outpatient.   - You were seen by pain management while in the hospital, please follow up with Dr. Samuel outpatient.     Please also follow up with your primary care doctor.     Pain Expectations:  - pain after surgery is expected  - please take pain meds as prescribed     Medications:  - Continue your home medications as prescribed.   - You are being discharged on tramadol 25mg every 6 hours as needed for moderate pain, and 50mg every 6 hours as needed for severe pain. You are also being discharged on robaxin 500mg every 8 hours as needed for muscle spasm.   - You have resumed your plavix while in the hospital.  - You need to have your labs checked to evaluate your antiphospholipid syndrome. The results of these labs will determine if you need to start eliquis.    - pain medications can cause constipation, you should eat a high fiber diet and may take a stool softener while on pain meds   - Avoid taking Advil (ibuprofen), Motrin (naproxen), or Aspirin for pain as they can cause bleeding     Call the office or come to ED if:  - wound has drainage or bleeding, increased redness or pain at incision site, neurological change, fever (>101), chills, night sweats, syncope, nausea/vomiting, chest pain, shortness of breath      Playback:  - A copy of your discharge instructions are on playback health jae.     WITHIN 24 HOURS OF DISCHARGE, PLEASE CONTACT NEURO PA  WITH ANY QUESTIONS OR CONCERNS: [phone]   OTHERWISE, PLEASE CALL THE OFFICE WITH ANY QUESTIONS OR CONCERNS: [phone]

## 2024-08-16 NOTE — DISCHARGE NOTE PROVIDER - PROVIDER TOKENS
PROVIDER:[TOKEN:[[id number]],SCHEDULEDAPPT:[09/06/2024],SCHEDULEDAPPTTIME:[10:00 AM]],PROVIDER:[TOKEN:[[id number]]]

## 2024-08-16 NOTE — DISCHARGE NOTE PROVIDER - NSDCCPCAREPLAN_GEN_ALL_CORE_FT
PRINCIPAL DISCHARGE DIAGNOSIS  Diagnosis: Unspecified kyphosis, thoracolumbar region  Assessment and Plan of Treatment:       SECONDARY DISCHARGE DIAGNOSES  Diagnosis: Hypertension  Assessment and Plan of Treatment:     Diagnosis: Hyperlipidemia  Assessment and Plan of Treatment:     Diagnosis: BPH (benign prostatic hyperplasia)  Assessment and Plan of Treatment:     Diagnosis: Type 2 diabetes mellitus  Assessment and Plan of Treatment:     Diagnosis: CAD (coronary artery disease)  Assessment and Plan of Treatment:

## 2024-08-16 NOTE — DISCHARGE NOTE PROVIDER - NSDCCPTREATMENT_GEN_ALL_CORE_FT
PRINCIPAL PROCEDURE  Procedure: Revision, fusion, spine, thoracic  Findings and Treatment: You had this procedure on 8/12.

## 2024-08-16 NOTE — DISCHARGE NOTE PROVIDER - HOSPITAL COURSE
HPI:  85 y/o male with hx HTN, HLD, BPH,  T2DM (Hgba1c 6.7), CAD s/p stent about 4 yrs ago (on Plavix), +antiphospholipid antibody w history of B/L LE DVT was on Xarelto (4/2023 dx), CKD, Nephrolithiasis, Gout, Interstitial Lung Disease, L1-S1 fusion in 2020 @[facility]. Was worked up for severe, worsening low back pain, worse with ambulation. 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 Now s/p revision T10-L2 fusion, laminectomy T2/L1 (8/12).    Hospital Course:  8/12: POD 0 s/p revision T10-L2 fusion, laminectomy T2/L1. DC precedex for bradycardia to 40s. Zofran DC'd for borderline QTc. Atropine @ bedside for 1st deg heart block. Warmed IVF, bearhugger for hypothermia 90F w/ improvement. DC'd ketamine gtt for somnolence.   8/13: POD 1 revision T10-L2 fusion, T2/L1 lami. TRANG overnight. 500cc bolus for soft SBP. hematuria due pt pulling on rothman.   8/14: POD2 s/p revision T10-L2 fusion, T2/L1 lami. Significant pain overnight, continued Oxy 10/15mg PRN, methadone 2.5mg q8hrs, given ambien and Xanax PRN overnight for anxiety, CP w/u with EKG stable, HR stable and VT interval improved. Cont to f/u BH recs. Pain mgmt following, need adjustment in pain recs. Pend postop xrays and LE dopplers. Pend AR. Dc'd methadone per pain recs. LE dopplers negative  8/15: POD3. TRANG o/n, APLS labs sent. post op xrays complete. oxy and gabapentin held due to oversedation   8/16: POD4. refused mirtazipine o/n.    Patient evaluated by PT/OT who recommended:  Patient is going home? rehab? hospice? Facility Name:     Hospital course c/b: N/A    Exam on day of discharge:    Checklist:   - Obtained follow up appointment from NP  - Reviewed final recommendations of inpatient consults  - review discharge planning on provider handoff  - post op imaging completed  - Neurologically stable for discharge  - Vitals stable for discharge   - Afebrile for discharge  - WBC is stable  - Sodium level is normal  - Pain is adequately controlled  - Pt has PICC/walker/brace/collar   - LACE score (10 or > needs PCP apt)   - stroke patient? Discharge NIHSS score   HPI:  83 y/o male with hx HTN, HLD, BPH,  T2DM (Hgba1c 6.7), CAD s/p stent about 4 yrs ago (on Plavix), +antiphospholipid antibody w history of B/L LE DVT was on Xarelto (4/2023 dx), CKD, Nephrolithiasis, Gout, Interstitial Lung Disease, L1-S1 fusion in 2020 @[facility]. Was worked up for severe, worsening low back pain, worse with ambulation. 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 Now s/p revision T10-L2 fusion, laminectomy T2/L1 (8/12).    Hospital Course:  8/12: POD 0 s/p revision T10-L2 fusion, laminectomy T2/L1. DC precedex for bradycardia to 40s. Zofran DC'd for borderline QTc. Atropine @ bedside for 1st deg heart block. Warmed IVF, bearhugger for hypothermia 90F w/ improvement. DC'd ketamine gtt for somnolence.   8/13: POD 1 revision T10-L2 fusion, T2/L1 lami. TRANG overnight. 500cc bolus for soft SBP. hematuria due pt pulling on rothman.   8/14: POD2 s/p revision T10-L2 fusion, T2/L1 lami. Significant pain overnight, continued Oxy 10/15mg PRN, methadone 2.5mg q8hrs, given ambien and Xanax PRN overnight for anxiety, CP w/u with EKG stable, HR stable and NC interval improved. Cont to f/u BH recs. Pain mgmt following, need adjustment in pain recs. Pend postop xrays and LE dopplers. Pend AR. Dc'd methadone per pain recs. LE dopplers negative  8/15: POD3. TRANG o/n, APLS labs sent. post op xrays complete. oxy and gabapentin held due to oversedation   8/16: POD4. refused mirtazipine o/n.    Patient evaluated by PT/OT who recommended:  Patient is going home? rehab? hospice? Facility Name:     Hospital course c/b: N/A    Exam on day of discharge:    Checklist:   - Obtained follow up appointment from NP  - Reviewed final recommendations of inpatient consults  - review discharge planning on provider handoff  - Neurologically stable for discharge  - Vitals stable for discharge   - Afebrile for discharge  - WBC is stable  - Sodium level is normal  - Pain is adequately controlled  - Pt has PICC/walker/brace/collar    HPI:  85 y/o male with hx HTN, HLD, BPH,  T2DM (Hgba1c 6.7), CAD s/p stent about 4 yrs ago (on Plavix), +antiphospholipid antibody w history of B/L LE DVT was on Xarelto (4/2023 dx), CKD, Nephrolithiasis, Gout, Interstitial Lung Disease, L1-S1 fusion in 2020 @[facility]. Was worked up for severe, worsening low back pain, worse with ambulation. 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 Now s/p revision T10-L2 fusion, laminectomy T2/L1 (8/12).    Hospital Course:  8/12: POD 0 s/p revision T10-L2 fusion, laminectomy T2/L1. DC precedex for bradycardia to 40s. Zofran DC'd for borderline QTc. Atropine @ bedside for 1st deg heart block. Warmed IVF, bearhugger for hypothermia 90F w/ improvement. DC'd ketamine gtt for somnolence.   8/13: POD 1 revision T10-L2 fusion, T2/L1 lami. TRANG overnight. 500cc bolus for soft SBP. hematuria due pt pulling on rothman.   8/14: POD2 s/p revision T10-L2 fusion, T2/L1 lami. Significant pain overnight, continued Oxy 10/15mg PRN, methadone 2.5mg q8hrs, given ambien and Xanax PRN overnight for anxiety, CP w/u with EKG stable, HR stable and RI interval improved. Cont to f/u BH recs. Pain mgmt following, need adjustment in pain recs. Pend postop xrays and LE dopplers. Pend AR. Dc'd methadone per pain recs. LE dopplers negative  8/15: POD3. TRANG o/n, APLS labs sent. post op xrays complete. oxy and gabapentin held due to oversedation   8/16: POD4. refused mirtazipine o/n. decreased oxy to 2.5 for moderate pain, HMV dc. Given xanax 0.5mg for anxiety. Given lactulose 10mg x 1 for constipation. Dressing over HMV changed.   8/17: POD5, TRANG o/n. Added lactulose 10mg qd (home med). Attempted to elope in morning, refer to event note. Increased Flomax to 0.8mg in the evening, urology consulted, NTD for urethral stricture, rec rothman if cont straight cath. Given xanax x 1 at bedtime for anxiety.   8/18: POD6. Pt complaining of pain overnight, refusing pain medications. Pending BM, given suppository, +BM. Trialing tramadol for pain. Given ambien and xanax.   8/19: POD 7. R ankle XR ordered overnight for severe R ankle pain, negative for fracture. Resumed Plavix. resumed home Toprol.  8/20: POD8, o/n patient still refusing labs but c/o severe pain. Given tramadol with approval of pain management team. Cr decreased, keep Tramadol q6.     Patient evaluated by PT/OT who recommended: Acute rehab  [facility]     Hospital course c/b: N/A    Exam on day of discharge:  Constitutional: NAD, resting comfortably in bed.   HEENT: Pupils equal, round, reactive to light. EOMI. Face symmetric, tongue midline.  Respiratory: No respiratory distress, lungs clear to auscultation bilaterally.   Cardiovascular: RRR, S1, S2.   Gastrointestinal: Abdomen soft, non tender, nondistended.  Neurological:  AAOX3. Opens eyes. Follows commands. Speech clear.  Cranial Nerves: II-XII intact  Motor: 5/5 power in b/l UE and LE  Sensation: intact to light touch in all extremities  DTRs: 2+  Negative Roberts's bilaterally, no clonus.   Pronator Drift: none  Extremities: Warm, well perfused.  Wounds: well healing lumbar incision    Patient is neurologically stable for discharge. Pain is well controlled, labs and vitals are within normal limits. Post op imaging complete.

## 2024-08-16 NOTE — PROGRESS NOTE ADULT - SUBJECTIVE AND OBJECTIVE BOX
Patient is a 84y old  Male who presents with a chief complaint of T10-L2 revision of fusion (16 Aug 2024 08:25)      SUBJECTIVE:  Patient seen and examined at bedside.  Resting in bedside chair. Reports back pain is unchanged from yesterday but LLE pain has resolved.  Still with urinary retention was straight cath overnight and this AM, now allowing.  Has not yet had BM.  Discussed allowing labs, patient still refusing at this time, understands there is a risk of not knowing renal function, electrolytes.    ROS:  Denies fevers, chills, vision changes, neck pain, cough, SOB, chest pain, Abdominal pain, N/V.  All other ROS negative except as above    Vital Signs Last 12 Hrs  T(F): 98 (08-16-24 @ 14:00), Max: 98.5 (08-16-24 @ 04:53)  HR: 68 (08-16-24 @ 12:32) (66 - 92)  BP: 131/98 (08-16-24 @ 12:32) (117/56 - 131/98)  BP(mean): 110 (08-16-24 @ 12:32) (81 - 110)  RR: 13 (08-16-24 @ 12:00) (13 - 18)  SpO2: 98% (08-16-24 @ 12:00) (96% - 98%)  I&O's Summary    15 Aug 2024 07:01  -  16 Aug 2024 07:00  --------------------------------------------------------  IN: 575 mL / OUT: 1495 mL / NET: -920 mL        PHYSICAL EXAM:  Constitutional: NAD, elderly male sitting in bedside chair  HEENT: EOMI, MMM  Neck: Supple  Respiratory: CTA B/L, no w/r/r  Cardiovascular: RRR, normal S1 and S2  Gastrointestinal: soft NTND   Extremities: wwp; no cyanosis or clubbing. + minimal b/l LE pitting edema.  Neurological: AAOx3, LE strength limited by pain   Skin: No gross skin abnormalities or rashes.  Back dressing c/d/i. HMV and EZEKIEL with serosanguinous drainage        LABS:  No new labs    RADIOLOGY & ADDITIONAL TESTS:  No new imaging    MEDICATIONS  (STANDING):  acetaminophen     Tablet .. 1000 milliGRAM(s) Oral every 8 hours  allopurinol 100 milliGRAM(s) Oral daily  atorvastatin 80 milliGRAM(s) Oral at bedtime  budesonide 160 MICROgram(s)/formoterol 4.5 MICROgram(s) Inhaler 2 Puff(s) Inhalation once  finasteride 5 milliGRAM(s) Oral daily  heparin   Injectable 5000 Unit(s) SubCutaneous every 8 hours  insulin lispro (ADMELOG) corrective regimen sliding scale   SubCutaneous every 6 hours  lidocaine   4% Patch 1 Patch Transdermal every 24 hours  lidocaine   4% Patch 1 Patch Transdermal every 24 hours  methocarbamol 500 milliGRAM(s) Oral every 8 hours  mirtazapine 15 milliGRAM(s) Oral at bedtime  multivitamin 1 Tablet(s) Oral daily  polyethylene glycol 3350 17 Gram(s) Oral daily  senna 2 Tablet(s) Oral at bedtime  tamsulosin 0.4 milliGRAM(s) Oral at bedtime    MEDICATIONS  (PRN):  atropine Injectable 1 milliGRAM(s) IntraMuscular once PRN Sustianed bradycardia HR less than 35 beats/min  benzocaine/menthol Lozenge 1 Lozenge Oral four times a day PRN Sore Throat  naloxone Injectable 0.4 milliGRAM(s) IV Push once PRN Signs of opioid overdose  oxyCODONE    IR 2.5 milliGRAM(s) Oral every 4 hours PRN Moderate Pain (4 - 6)  oxyCODONE    IR 5 milliGRAM(s) Oral every 4 hours PRN Severe Pain (7 - 10)

## 2024-08-16 NOTE — PROGRESS NOTE ADULT - SUBJECTIVE AND OBJECTIVE BOX
HPI:  85 y/o male with hx HTN, HLD, BPH,  T2DM (Hgba1c 6.7), CAD s/p stent~ 4 yrs ago (on Plavix), +antiphospholipid antibody w history of B/L LE DVT was on elliquis (4/2023 dx), CKD, Nephrolithiasis, Gout, Interstitial Lung Disease, L1-S1 fusion in 2020 @[facility].   Hx lower back pain radiating to right buttocks. He denies pain radiating to the lower extremities. Currently he can walk for approximately 1 block until the pain becomes debilitating and he has to stop. He also finds himself hunched over when ambulating. 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   He denies any urinary/bowel dysfunction, saddle anesthesia. Today patient also reports that he did not stop the suboxone as instructed (was supposed to stop 3 days preop) because he didn't want to take oxycodone.   (12 Aug 2024 06:34)    OVERNIGHT EVENTS: TRANG    Hospital Course:   8/12: POD 0 s/p revision T10-L2 fusion, laminectomy T2/L1. DC precedex for bradycardia to 40s. Zofran DC'd for borderline QTc. Atropine @ bedside for 1st deg heart block. Warmed IVF, bearhugger for hypothermia 90F w/ improvement. DC'd ketamine gtt for somnolence.   8/13: POD 1 revision T10-L2 fusion, T2/L1 lami. TRANG overnight. 500cc bolus for soft SBP. hematuria due pt pulling on rothman.   8/14: POD2 s/p revision T10-L2 fusion, T2/L1 lami. Significant pain overnight, continued Oxy 10/15mg PRN, methadone 2.5mg q8hrs, given ambien and Xanax PRN overnight for anxiety, CP w/u with EKG stable, HR stable and CO interval improved. Cont to f/u BH recs. Pain mgmt following, need adjustment in pain recs. Pend postop xrays and LE dopplers. Pend AR. Dc'd methadone per pain recs. LE dopplers negative  8/15: POD3. TRANG o/n, APLS labs sent. post op xrays complete. oxy and gabapentin held due to oversedation   8/16: POD4. TRANG o/n    Vital Signs Last 24 Hrs  T(C): 37.2 (15 Aug 2024 22:00), Max: 37.2 (15 Aug 2024 22:00)  T(F): 99 (15 Aug 2024 22:00), Max: 99 (15 Aug 2024 22:00)  HR: 74 (16 Aug 2024 00:07) (64 - 104)  BP: 128/58 (15 Aug 2024 23:46) (121/60 - 170/70)  BP(mean): 84 (15 Aug 2024 23:46) (84 - 107)  RR: 18 (15 Aug 2024 23:46) (17 - 18)  SpO2: 95% (16 Aug 2024 00:07) (94% - 98%)    Parameters below as of 15 Aug 2024 23:46  Patient On (Oxygen Delivery Method): room air        I&O's Summary    14 Aug 2024 07:01  -  15 Aug 2024 07:00  --------------------------------------------------------  IN: 0 mL / OUT: 1070 mL / NET: -1070 mL    15 Aug 2024 07:01  -  16 Aug 2024 00:22  --------------------------------------------------------  IN: 425 mL / OUT: 885 mL / NET: -460 mL        PHYSICAL EXAM:  GEN: laying in bed, NAD  NEURO: AOx3. FC, OE spont, speech intact, face symmetric. CNII-XII intact. PERRL, EOMI. No pronator drift. MAEx4. 5/5 strength throughout. SILT  CV: RRR +S1/S2  PULM: CTAB  GI: Abd soft, NT/ND  EXT: ext warm, dry, nontender  WOUND: T/L spine incision c/d/i    TUBES/LINES:  [] Rothman  [] Lumbar Drain  [x] Wound Drains: HMVx1, JPx1  [] Others      DIET:  [] NPO  [x] Mechanical  [] Tube feeds    LABS:                  CAPILLARY BLOOD GLUCOSE          Drug Levels: [] N/A    CSF Analysis: [] N/A      Allergies    latex (Blisters)  No Known Drug Allergies    Intolerances      MEDICATIONS:  Antibiotics:    Neuro:  acetaminophen     Tablet .. 1000 milliGRAM(s) Oral every 8 hours  methocarbamol 500 milliGRAM(s) Oral every 8 hours  mirtazapine 15 milliGRAM(s) Oral at bedtime  oxyCODONE    IR 5 milliGRAM(s) Oral every 4 hours PRN    Anticoagulation:  heparin   Injectable 5000 Unit(s) SubCutaneous every 8 hours    OTHER:  allopurinol 100 milliGRAM(s) Oral daily  atorvastatin 80 milliGRAM(s) Oral at bedtime  atropine Injectable 1 milliGRAM(s) IntraMuscular once PRN  benzocaine/menthol Lozenge 1 Lozenge Oral four times a day PRN  budesonide 160 MICROgram(s)/formoterol 4.5 MICROgram(s) Inhaler 2 Puff(s) Inhalation once  finasteride 5 milliGRAM(s) Oral daily  insulin lispro (ADMELOG) corrective regimen sliding scale   SubCutaneous every 6 hours  lidocaine   4% Patch 1 Patch Transdermal every 24 hours  lidocaine   4% Patch 1 Patch Transdermal every 24 hours  naloxone Injectable 0.4 milliGRAM(s) IV Push once PRN  polyethylene glycol 3350 17 Gram(s) Oral daily  senna 2 Tablet(s) Oral at bedtime  tamsulosin 0.4 milliGRAM(s) Oral at bedtime    IVF:  multivitamin 1 Tablet(s) Oral daily    CULTURES:    RADIOLOGY & ADDITIONAL TESTS:      ASSESSMENT:  85 y/o male with hx HTN, HLD, BPH,  T2DM (Hgba1c 6.7), CAD s/p stent about 4 yrs ago (on Plavix), +antiphospholipid antibody w history of B/L LE DVT was on Xarelto (4/2023 dx), CKD, Nephrolithiasis, Gout, Interstitial Lung Disease, L1-S1 fusion in 2020 @[facility]. Was worked up for severe, worsening low back pain, worse with ambulation. 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 Now s/p revision T10-L2 fusion, laminectomy T2/L1 (8/12).    M40.205    Handoff    MEWS Score    Diabetes    BPH (benign prostatic hypertrophy)    Hyperlipidemia    Gout    HTN (hypertension)    CAD (coronary artery disease)    Chronic kidney disease (CKD)    Back pain    Deep vein thrombosis (DVT)    H/O kyphosis    CRI (chronic renal insufficiency)    History of DVT in adulthood    Venous insufficiency    H/O: depression    H/O edema    Chronic pain syndrome    PAF (paroxysmal atrial fibrillation)    History of TIAs    HTN (hypertension)    CAD (coronary artery disease)    Lumbar pseudoarthrosis    Lumbar pseudoarthrosis    Major depression    Depression    Revision, fusion, spine, thoracic    H/O heart artery stent    H/O shoulder surgery    History of back surgery    H/O neck surgery    H/O total knee replacement    PAF (paroxysmal atrial fibrillation)    Room Service Assist    [email or web address]        PLAN:  Neuro:  - Neuro/vitals q4hr  - Pain control: oxy 5/10, tylenol standing, robaxin 500q8, gabapentin 100q8, lidocaine patches (holding home buprenorphine) (silverio and oxy 10 held 8/15 d/t oversedation)  - pain mgmt following  - HMV x1, EZEKIEL x1, per plastics  - Insomnia: Mirtazapine 15mg QD for insomnia/depression (per psych)   - postop standing XRs complete    Cardio:  - SBP <160  - EKG 8/12: 1st degree heart block  - Hx cardiac stents: holding home ASA, plavix   - HLD: atorvastatin 80mg qd    Pulm:  - RA  - ILD: continue home Symbicort     GI:  - Regular  - Bowel regimen  - Last BM: pend     Renal:  - IVL  - uretral stricture, hematura due to patient tugging on rothman (rothman d/c 8/13)  - retaining, pt refusing SC (risks explained)  - CKD: trend Cr   - BPH: home finaseteride, flomax  - Gout: allopurinol 100mg daily    Heme:  - DVT ppx: SCDs, SQH  - hx b/l LE DVTs: LE dopplers 8/14: no DVT     ID:  - afebrile    Dispo: Stepdown, full code, pending AR    D/w Dr Hernandez    Assessment:  Present when checked    []  GCS  E   V  M     Heart Failure: []Acute, [] acute on chronic , []chronic  Heart Failure:  [] Diastolic (HFpEF), [] Systolic (HFrEF), []Combined (HFpEF and HFrEF), [] RHF, [] Pulm HTN, [] Other    [] JONH, [] ATN, [] AIN, [] other  [] CKD1, [] CKD2, [] CKD 3, [] CKD 4, [] CKD 5, []ESRD    Encephalopathy: [] Metabolic, [] Hepatic, [] toxic, [] Neurological, [] Other    Abnormal Nurtitional Status: [] malnurtition (see nutrition note), [ ]underweight: BMI < 19, [] morbid obesity: BMI >40, [] Cachexia    [] Sepsis  [] hypovolemic shock,[] cardiogenic shock, [] hemorrhagic shock, [] neuogenic shock  [] Acute Respiratory Failure  []Cerebral edema, [] Brain compression/ herniation,   [] Functional quadriplegia  [] Acute blood loss anemia

## 2024-08-16 NOTE — PROGRESS NOTE ADULT - ASSESSMENT
85 y/o male with hx HTN, HLD, BPH, T2DM (Hgba1c 6.7), CAD s/p remote stent to LAD (on Plavix), DVT b/l LEs in 4/23 with + Cardiolipin, CKD, Nephrolithiasis, Gout, Interstitial Lung Disease, L1-S1 fusion in 2020 @[facility] who was worked up for severe, worsening low back pain, worse with ambulation. 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 Now s/p revision T10-L2 fusion, laminectomy T2/L1 (8/12)    #Severe lumbar degenerative disease  #post op state  - s/p revision T10-L2 fusion, laminectomy T2/L1 (8/12)  - pain control per primary team and pain management  - hx of constipation with PRN lactulose use, c/w aggressive bowel regimen.  Would double miralax and add lactulose  - PT/OT eval - AR  - DVT ppx: Hep sub Q  - encourage incentive spirometer, OOB as tolerated  - hold home ambien to avoid falls    #CAD with remote PCI  - resume Plavix when safe from surgical standpoint  - c/w Lipitor 80mg    #DM  - well controlled, A1c < 7  - mISS    #Hx of B/l DVT  #Cardiolipin +  - DVTs in 4/23  - has been on Eliquis 5mg BID  - dopplers negative  - Charts reviewed, anticardiolipin antibody + in April 2023 after dx with DVTs, was on DOAC at the time of testing. Possibly false positive.  Has been recommended for heme referral since then but has not established or had repeat testing, DOAC held currently but possibly should be on Warfarin.  Would repeat testing now that he is off DOAC but patient refusing all blood work at this time, will attempt tomorrow    #Depression  - BH following, appreciate recs. C/w mirtazepine 15mg, started this admit    #HLD  - c/w Lipitor 80    #HTN  - Previously was on Toprol 25mg, Enalapril 5mg, Amlodipine 5mg but having episodes of hypotension.  Was considered for ambulatory BP monitoring  - also was on Lasix 80mg BID  - currently holding will resume as needed/tolerate    #BPH with urinary retention  - rothman removed  - c/w Finasteride and Flomax  - having retention with > 500cc post void residuals, now allowing straight cath interventions    #ILD  - was referred to pulm  - c/w Symbicort    #Gout  - c/w home allopurinol    #CKD  - stable Cr around 1.8  - follows with Dr Araujo  - please check phos when patient allows labs to be repeated    Dispo: AR

## 2024-08-16 NOTE — DISCHARGE NOTE PROVIDER - NSDCMRMEDTOKEN_GEN_ALL_CORE_FT
allopurinol 100 mg oral tablet: orally once a day  Aspir 81 oral delayed release tablet: 1 tab(s) orally once a day  atorvastatin 80 mg oral tablet: 1 tab(s) orally once a day  buprenorphine-naloxone 2 mg-0.5 mg sublingual film: 1 film(s) sublingually 2 times a day  clopidogrel 75 mg oral tablet: 1 tab(s) orally once a day  finasteride 5 mg oral tablet: 1 tab(s) orally once a day  Flomax 0.4 mg oral capsule: 1 cap(s) orally once a day (at bedtime)  furosemide 20 mg oral tablet: 1 tab(s) orally once a day (at bedtime)  oxyCODONE 15 mg oral tablet: 1 tab(s) orally every 4 hours  zolpidem 5 mg oral tablet: 1 tab(s) orally   acetaminophen 500 mg oral tablet: 2 tab(s) orally every 8 hours  allopurinol 100 mg oral tablet: orally once a day  ALPRAZolam 0.5 mg oral tablet: 1 tab(s) orally every 12 hours As needed Anxiety  Aspir 81 oral delayed release tablet: 1 tab(s) orally once a day  atorvastatin 80 mg oral tablet: 1 tab(s) orally once a day  budesonide-formoterol 160 mcg-4.5 mcg/inh inhalation aerosol: 2 puff(s) inhaled once a day  clopidogrel 75 mg oral tablet: 1 tab(s) orally once a day  finasteride 5 mg oral tablet: 1 tab(s) orally once a day  Flomax 0.4 mg oral capsule: 1 cap(s) orally once a day (at bedtime)  furosemide 20 mg oral tablet: 1 tab(s) orally once a day (at bedtime)  heparin 5000 units/0.5 mL injectable solution: 5,000 unit(s) injectable every 8 hours  lactulose 10 g/15 mL oral syrup: 15 milliliter(s) orally once a day  lidocaine 4% topical film: Apply topically to affected area once a day  methocarbamol 500 mg oral tablet: 1 tab(s) orally every 8 hours  metoprolol succinate 25 mg oral tablet, extended release: 1 tab(s) orally once a day  Multiple Vitamins oral tablet: 1 tab(s) orally once a day  polyethylene glycol 3350 oral powder for reconstitution: 17 gram(s) orally 2 times a day  traMADol 50 mg oral tablet: 1 tab(s) orally every 6 hours As needed Severe Pain (7 - 10)  Ultram 50 mg oral tablet: 0.5 tab(s) orally every 6 hours As needed Moderate Pain (4 - 6)  zolpidem 5 mg oral tablet: 1 tab(s) orally

## 2024-08-16 NOTE — DISCHARGE NOTE PROVIDER - NSDCFUSCHEDAPPT_GEN_ALL_CORE_FT
Rob Hernandez  [facility] Physician [facility]  SPINECTR [address]  Scheduled Appointment: 09/19/2024    Salomón Araujo  [facility]  NEPHRO [address]  Scheduled Appointment: 12/06/2024

## 2024-08-16 NOTE — DISCHARGE NOTE PROVIDER - CARE PROVIDER_API CALL
Rob Hernandez  Neurosurgery  [address] [place] [address]  Phone: [phone]  Fax: [phone]  Scheduled Appointment: 09/06/2024 10:00 AM    Adrian Samuel  Pain Medicine  [address] [place] [address], [address]  [place] [address]  Phone: [phone]  Fax: [phone]  Follow Up Time:

## 2024-08-16 NOTE — PROGRESS NOTE ADULT - SUBJECTIVE AND OBJECTIVE BOX
PAIN MANAGEMENT CONSULT NOTE    Interval Events:  -       Since yesterday 6am, pt has required:  - oxycodone 5mg x 2    HOME MEDICATIONS:   Please refer to initial HNP    Allergies    latex (Blisters)  No Known Drug Allergies    Intolerances        PAIN MEDICATIONS:  acetaminophen     Tablet .. 1000 milliGRAM(s) Oral every 8 hours  methocarbamol 500 milliGRAM(s) Oral every 8 hours  mirtazapine 15 milliGRAM(s) Oral at bedtime  oxyCODONE    IR 5 milliGRAM(s) Oral every 4 hours PRN    Heme:  heparin   Injectable 5000 Unit(s) SubCutaneous every 8 hours    Antibiotics:    Cardiovascular:    GI:  atropine Injectable 1 milliGRAM(s) IntraMuscular once PRN  polyethylene glycol 3350 17 Gram(s) Oral daily  senna 2 Tablet(s) Oral at bedtime    Endocrine:  allopurinol 100 milliGRAM(s) Oral daily  atorvastatin 80 milliGRAM(s) Oral at bedtime  finasteride 5 milliGRAM(s) Oral daily  insulin lispro (ADMELOG) corrective regimen sliding scale   SubCutaneous every 6 hours    All Other Medications:  benzocaine/menthol Lozenge 1 Lozenge Oral four times a day PRN  lidocaine   4% Patch 1 Patch Transdermal every 24 hours  lidocaine   4% Patch 1 Patch Transdermal every 24 hours  multivitamin 1 Tablet(s) Oral daily  naloxone Injectable 0.4 milliGRAM(s) IV Push once PRN  tamsulosin 0.4 milliGRAM(s) Oral at bedtime      Vital Signs Last 24 Hrs  T(C): 36.9 (16 Aug 2024 04:53), Max: 37.2 (15 Aug 2024 22:00)  T(F): 98.5 (16 Aug 2024 04:53), Max: 99 (15 Aug 2024 22:00)  HR: 92 (16 Aug 2024 04:21) (64 - 104)  BP: 119/57 (16 Aug 2024 04:21) (119/57 - 170/70)  BP(mean): 82 (16 Aug 2024 04:21) (82 - 107)  RR: 18 (16 Aug 2024 04:21) (17 - 18)  SpO2: 98% (16 Aug 2024 04:21) (95% - 98%)    Parameters below as of 16 Aug 2024 04:21  Patient On (Oxygen Delivery Method): room air        LABS:                RADIOLOGY:    Drug Screen:        REVIEW OF SYSTEMS:  CONSTITUTIONAL: Denies fever or fatigue   EYES: Denies eye pain, visual disturbances  HEENT: Denies difficulty hearing, throat/neck pain or stiffness  RESPIRATORY: Denies SOB, cough, wheezing  CARDIOVASCULAR: Denies chest pain, palpitations.   GASTROINTESTINAL: Endorses +flatus, BMs. Denies nausea, vomiting, abdominal or epigastric pain.   GENITOURINARY: Denies dysuria, frequency, or incontinence  NEUROLOGICAL: Endorses *** numbness, tingling. Denies headaches, loss of strength, tremors, dizziness or lightheadedness with pain medications.   MUSCULOSKELETAL: Denies joint pain or swelling      FUNCTIONAL ASSESSMENT:  PAIN SCORE AT REST:         SCALE USED: (1-10 VNRS)  PAIN SCORE WITH ACTIVITY:         SCALE USED: (1-10 VNRS)    PAIN ASSESSMENT:    FOCUSED PHYSICAL EXAM  GENERAL: Laying in bed, NAD  NEURO: CN II-XII grossly intact, EOMI  PULM: unlabored  CV: Regular rate and rhythm  ABDOMEN: Soft, Nontender, Nondistended  EXTREMITIES:  2+ Peripheral Pulses, No clubbing, cyanosis, or edema  SKIN: No rashes or lesions      ASSESSMENT:  83 y/o male with hx HTN, HLD, BPH,  T2DM (Hgba1c 6.7), CAD s/p stent about 4 yrs ago (on Plavix), +antiphospholipid antibody w history of B/L LE DVT was on Xarelto (4/2023 dx), CKD, Nephrolithiasis, Gout, Interstitial Lung Disease, L1-S1 fusion in 2020 @[facility]. Was worked up for severe, worsening low back pain, worse with ambulation. 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 Now s/p revision T10-L2 fusion, laminectomy T2/L1 (8/12).      PLAN:   - continue tylenol 1gm q8h  - continue robaxin 500mg q8h  - can hold home suboxone 2mg BID  - continue oxycodone 5mg q4h prn pain pending improvement of mental status  - monitor closely for oversedation, ensure narcan is ordered  - escalate bowel regimen as needed for bowel movement daily  ***recs not yet finalized***    - Bowel regimen: Senna, miralax   - Nausea ppx: Zofran as needed  - Functional Goals: Pt will get OOB with PT today. Pt will resume previous level of activity without impairment from surgery.   - Additional Consults: None recommended.   - Additional Labs/Imaging:  None recommended.     - Discharge Planning: per primary team  - Pain Management follow up plan: will continue to follow    Plan d/w Dr. Samuel.     Ruthie Harrison NP  Acute Pain Service PAIN MANAGEMENT CONSULT NOTE    Interval Events:  - mental status improved this AM      Since yesterday 6am, pt has required:  - oxycodone 5mg x 2    HOME MEDICATIONS:   Please refer to initial HNP    Allergies    latex (Blisters)  No Known Drug Allergies    Intolerances        PAIN MEDICATIONS:  acetaminophen     Tablet .. 1000 milliGRAM(s) Oral every 8 hours  methocarbamol 500 milliGRAM(s) Oral every 8 hours  mirtazapine 15 milliGRAM(s) Oral at bedtime  oxyCODONE    IR 5 milliGRAM(s) Oral every 4 hours PRN    Heme:  heparin   Injectable 5000 Unit(s) SubCutaneous every 8 hours    Antibiotics:    Cardiovascular:    GI:  atropine Injectable 1 milliGRAM(s) IntraMuscular once PRN  polyethylene glycol 3350 17 Gram(s) Oral daily  senna 2 Tablet(s) Oral at bedtime    Endocrine:  allopurinol 100 milliGRAM(s) Oral daily  atorvastatin 80 milliGRAM(s) Oral at bedtime  finasteride 5 milliGRAM(s) Oral daily  insulin lispro (ADMELOG) corrective regimen sliding scale   SubCutaneous every 6 hours    All Other Medications:  benzocaine/menthol Lozenge 1 Lozenge Oral four times a day PRN  lidocaine   4% Patch 1 Patch Transdermal every 24 hours  lidocaine   4% Patch 1 Patch Transdermal every 24 hours  multivitamin 1 Tablet(s) Oral daily  naloxone Injectable 0.4 milliGRAM(s) IV Push once PRN  tamsulosin 0.4 milliGRAM(s) Oral at bedtime      Vital Signs Last 24 Hrs  T(C): 36.9 (16 Aug 2024 04:53), Max: 37.2 (15 Aug 2024 22:00)  T(F): 98.5 (16 Aug 2024 04:53), Max: 99 (15 Aug 2024 22:00)  HR: 92 (16 Aug 2024 04:21) (64 - 104)  BP: 119/57 (16 Aug 2024 04:21) (119/57 - 170/70)  BP(mean): 82 (16 Aug 2024 04:21) (82 - 107)  RR: 18 (16 Aug 2024 04:21) (17 - 18)  SpO2: 98% (16 Aug 2024 04:21) (95% - 98%)    Parameters below as of 16 Aug 2024 04:21  Patient On (Oxygen Delivery Method): room air        LABS:                RADIOLOGY:    Drug Screen:        REVIEW OF SYSTEMS:  CONSTITUTIONAL: Denies fever or fatigue   EYES: Denies eye pain, visual disturbances  HEENT: Denies difficulty hearing, throat/neck pain or stiffness  RESPIRATORY: Denies SOB, cough, wheezing  CARDIOVASCULAR: Denies chest pain, palpitations.   GASTROINTESTINAL: Endorses +flatus, BMs. Denies nausea, vomiting, abdominal or epigastric pain.   GENITOURINARY: Denies dysuria, frequency, or incontinence  NEUROLOGICAL: Denies numbness, tingling. Denies headaches, loss of strength, tremors, dizziness or lightheadedness with pain medications.   MUSCULOSKELETAL: Denies joint pain or swelling      FUNCTIONAL ASSESSMENT:  PAIN SCORE AT REST:         SCALE USED: (1-10 VNRS)  PAIN SCORE WITH ACTIVITY:         SCALE USED: (1-10 VNRS)    PAIN ASSESSMENT:  - 9/10 incisional low back pain    FOCUSED PHYSICAL EXAM  GENERAL: sitting up in chair, NAD  NEURO: slightly drowsy but improved, answers orientation questions  PULM: unlabored  CV: Regular rate and rhythm  ABDOMEN: Soft, Nontender, Nondistended  EXTREMITIES:  2+ Peripheral Pulses, No clubbing, cyanosis, or edema  SKIN: No rashes or lesions      ASSESSMENT:  85 y/o male with hx HTN, HLD, BPH,  T2DM (Hgba1c 6.7), CAD s/p stent about 4 yrs ago (on Plavix), +antiphospholipid antibody w history of B/L LE DVT was on Xarelto (4/2023 dx), CKD, Nephrolithiasis, Gout, Interstitial Lung Disease, L1-S1 fusion in 2020 @[facility]. Was worked up for severe, worsening low back pain, worse with ambulation. 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 Now s/p revision T10-L2 fusion, laminectomy T2/L1 (8/12).      PLAN:   - continue tylenol 1gm q8h  - continue robaxin 500mg q8h  - can hold home suboxone 2mg BID  - continue oxycodone 5mg q4h prn pain pending improvement of mental status  - monitor closely for oversedation, ensure narcan is ordered  - escalate bowel regimen as needed for bowel movement daily  ***recs not yet finalized***    - Bowel regimen: Senna, miralax   - Nausea ppx: Zofran as needed  - Functional Goals: Pt will get OOB with PT today. Pt will resume previous level of activity without impairment from surgery.   - Additional Consults: None recommended.   - Additional Labs/Imaging:  None recommended.     - Discharge Planning: per primary team  - Pain Management follow up plan: will continue to follow    Plan d/w Dr. Samuel.     Ruthie Harrison NP  Acute Pain Service PAIN MANAGEMENT CONSULT NOTE    Interval Events:  - mental status improved this AM      Since yesterday 6am, pt has required:  - oxycodone 5mg x 2    HOME MEDICATIONS:   Please refer to initial HNP    Allergies    latex (Blisters)  No Known Drug Allergies    Intolerances        PAIN MEDICATIONS:  acetaminophen     Tablet .. 1000 milliGRAM(s) Oral every 8 hours  methocarbamol 500 milliGRAM(s) Oral every 8 hours  mirtazapine 15 milliGRAM(s) Oral at bedtime  oxyCODONE    IR 5 milliGRAM(s) Oral every 4 hours PRN    Heme:  heparin   Injectable 5000 Unit(s) SubCutaneous every 8 hours    Antibiotics:    Cardiovascular:    GI:  atropine Injectable 1 milliGRAM(s) IntraMuscular once PRN  polyethylene glycol 3350 17 Gram(s) Oral daily  senna 2 Tablet(s) Oral at bedtime    Endocrine:  allopurinol 100 milliGRAM(s) Oral daily  atorvastatin 80 milliGRAM(s) Oral at bedtime  finasteride 5 milliGRAM(s) Oral daily  insulin lispro (ADMELOG) corrective regimen sliding scale   SubCutaneous every 6 hours    All Other Medications:  benzocaine/menthol Lozenge 1 Lozenge Oral four times a day PRN  lidocaine   4% Patch 1 Patch Transdermal every 24 hours  lidocaine   4% Patch 1 Patch Transdermal every 24 hours  multivitamin 1 Tablet(s) Oral daily  naloxone Injectable 0.4 milliGRAM(s) IV Push once PRN  tamsulosin 0.4 milliGRAM(s) Oral at bedtime      Vital Signs Last 24 Hrs  T(C): 36.9 (16 Aug 2024 04:53), Max: 37.2 (15 Aug 2024 22:00)  T(F): 98.5 (16 Aug 2024 04:53), Max: 99 (15 Aug 2024 22:00)  HR: 92 (16 Aug 2024 04:21) (64 - 104)  BP: 119/57 (16 Aug 2024 04:21) (119/57 - 170/70)  BP(mean): 82 (16 Aug 2024 04:21) (82 - 107)  RR: 18 (16 Aug 2024 04:21) (17 - 18)  SpO2: 98% (16 Aug 2024 04:21) (95% - 98%)    Parameters below as of 16 Aug 2024 04:21  Patient On (Oxygen Delivery Method): room air        LABS:                RADIOLOGY:    Drug Screen:        REVIEW OF SYSTEMS:  CONSTITUTIONAL: Denies fever or fatigue   EYES: Denies eye pain, visual disturbances  HEENT: Denies difficulty hearing, throat/neck pain or stiffness  RESPIRATORY: Denies SOB, cough, wheezing  CARDIOVASCULAR: Denies chest pain, palpitations.   GASTROINTESTINAL: Endorses +flatus, BMs. Denies nausea, vomiting, abdominal or epigastric pain.   GENITOURINARY: Denies dysuria, frequency, or incontinence  NEUROLOGICAL: Denies numbness, tingling. Denies headaches, loss of strength, tremors, dizziness or lightheadedness with pain medications.   MUSCULOSKELETAL: Denies joint pain or swelling      FUNCTIONAL ASSESSMENT:  PAIN SCORE AT REST:         SCALE USED: (1-10 VNRS)  PAIN SCORE WITH ACTIVITY:         SCALE USED: (1-10 VNRS)    PAIN ASSESSMENT:  - 9/10 incisional low back pain    FOCUSED PHYSICAL EXAM  GENERAL: sitting up in chair, NAD  NEURO: slightly drowsy but improved, answers orientation questions  PULM: unlabored  CV: Regular rate and rhythm  ABDOMEN: Soft, Nontender, Nondistended  EXTREMITIES:  2+ Peripheral Pulses, No clubbing, cyanosis, or edema  SKIN: No rashes or lesions      ASSESSMENT:  85 y/o male with hx HTN, HLD, BPH,  T2DM (Hgba1c 6.7), CAD s/p stent about 4 yrs ago (on Plavix), +antiphospholipid antibody w history of B/L LE DVT was on Xarelto (4/2023 dx), CKD, Nephrolithiasis, Gout, Interstitial Lung Disease, L1-S1 fusion in 2020 @[facility]. Was worked up for severe, worsening low back pain, worse with ambulation. 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 Now s/p revision T10-L2 fusion, laminectomy T2/L1 (8/12).      PLAN:   - continue tylenol 1gm q8h  - continue robaxin 500mg q8h  - can hold home suboxone 2mg BID  - continue oxycodone 5mg q4h prn pain pending improvement of mental status, consider adding 2.5mg option (q4h prn moderate pain)  - monitor closely for oversedation, ensure narcan is ordered  - escalate bowel regimen as needed for bowel movement daily      - Bowel regimen: Senna, miralax   - Nausea ppx: Zofran as needed  - Functional Goals: Pt will get OOB with PT today. Pt will resume previous level of activity without impairment from surgery.   - Additional Consults: None recommended.   - Additional Labs/Imaging:  None recommended.     - Discharge Planning: per primary team  - Pain Management follow up plan: will continue to follow    Plan d/w Dr. Samuel.     Ruthie Harrison NP  Acute Pain Service

## 2024-08-17 PROCEDURE — 99232 SBSQ HOSP IP/OBS MODERATE 35: CPT

## 2024-08-17 PROCEDURE — 99231 SBSQ HOSP IP/OBS SF/LOW 25: CPT

## 2024-08-17 RX ORDER — ALPRAZOLAM 0.25 MG
0.5 TABLET ORAL
Refills: 0 | Status: DISCONTINUED | OUTPATIENT
Start: 2024-08-17 | End: 2024-08-20

## 2024-08-17 RX ORDER — LACTULOSE 10 G
10 PACKET (EA) ORAL DAILY
Refills: 0 | Status: DISCONTINUED | OUTPATIENT
Start: 2024-08-17 | End: 2024-08-20

## 2024-08-17 RX ORDER — ZOLPIDEM TARTRATE 5 MG/1
5 TABLET, FILM COATED ORAL AT BEDTIME
Refills: 0 | Status: DISCONTINUED | OUTPATIENT
Start: 2024-08-17 | End: 2024-08-20

## 2024-08-17 RX ORDER — ZOLPIDEM TARTRATE 5 MG/1
5 TABLET, FILM COATED ORAL ONCE
Refills: 0 | Status: DISCONTINUED | OUTPATIENT
Start: 2024-08-17 | End: 2024-08-17

## 2024-08-17 RX ORDER — ALPRAZOLAM 0.25 MG
0.5 TABLET ORAL ONCE
Refills: 0 | Status: DISCONTINUED | OUTPATIENT
Start: 2024-08-17 | End: 2024-08-17

## 2024-08-17 RX ORDER — TAMSULOSIN HYDROCHLORIDE 0.4 MG/1
0.8 CAPSULE ORAL AT BEDTIME
Refills: 0 | Status: DISCONTINUED | OUTPATIENT
Start: 2024-08-17 | End: 2024-08-20

## 2024-08-17 RX ORDER — HALOPERIDOL 1 MG
1 TABLET ORAL ONCE
Refills: 0 | Status: DISCONTINUED | OUTPATIENT
Start: 2024-08-17 | End: 2024-08-17

## 2024-08-17 RX ORDER — OXYCODONE HYDROCHLORIDE 5 MG/1
10 TABLET ORAL ONCE
Refills: 0 | Status: DISCONTINUED | OUTPATIENT
Start: 2024-08-17 | End: 2024-08-17

## 2024-08-17 RX ORDER — ACETAMINOPHEN 325 MG/1
650 TABLET ORAL EVERY 6 HOURS
Refills: 0 | Status: DISCONTINUED | OUTPATIENT
Start: 2024-08-17 | End: 2024-08-19

## 2024-08-17 RX ADMIN — Medication 1 PATCH: at 22:09

## 2024-08-17 RX ADMIN — Medication 1 PATCH: at 09:46

## 2024-08-17 RX ADMIN — FINASTERIDE 5 MILLIGRAM(S): 1 TABLET, FILM COATED ORAL at 11:35

## 2024-08-17 RX ADMIN — Medication 0.5 MILLIGRAM(S): at 13:31

## 2024-08-17 RX ADMIN — Medication 5000 UNIT(S): at 13:22

## 2024-08-17 RX ADMIN — Medication 10 GRAM(S): at 11:35

## 2024-08-17 RX ADMIN — TAMSULOSIN HYDROCHLORIDE 0.8 MILLIGRAM(S): 0.4 CAPSULE ORAL at 22:09

## 2024-08-17 RX ADMIN — Medication 1 TABLET(S): at 11:34

## 2024-08-17 RX ADMIN — ZOLPIDEM TARTRATE 5 MILLIGRAM(S): 5 TABLET, FILM COATED ORAL at 12:17

## 2024-08-17 RX ADMIN — Medication 0.5 MILLIGRAM(S): at 22:20

## 2024-08-17 RX ADMIN — METHOCARBAMOL 500 MILLIGRAM(S): 750 TABLET, FILM COATED ORAL at 13:22

## 2024-08-17 RX ADMIN — Medication 80 MILLIGRAM(S): at 22:08

## 2024-08-17 RX ADMIN — Medication 100 MILLIGRAM(S): at 11:36

## 2024-08-17 RX ADMIN — METHOCARBAMOL 500 MILLIGRAM(S): 750 TABLET, FILM COATED ORAL at 22:08

## 2024-08-17 NOTE — PROGRESS NOTE ADULT - SUBJECTIVE AND OBJECTIVE BOX
HPI:  83 y/o male with hx HTN, HLD, BPH,  T2DM (Hgba1c 6.7), CAD s/p stent~ 4 yrs ago (on Plavix), +antiphospholipid antibody w history of B/L LE DVT was on elliquis (4/2023 dx), CKD, Nephrolithiasis, Gout, Interstitial Lung Disease, L1-S1 fusion in 2020 @[facility].   Hx lower back pain radiating to right buttocks. He denies pain radiating to the lower extremities. Currently he can walk for approximately 1 block until the pain becomes debilitating and he has to stop. He also finds himself hunched over when ambulating. 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   He denies any urinary/bowel dysfunction, saddle anesthesia. Today patient also reports that he did not stop the suboxone as instructed (was supposed to stop 3 days preop) because he didn't want to take oxycodone.   (12 Aug 2024 06:34)    OVERNIGHT EVENTS: Given xanax for anxiety. Given lactulose 10g x 1 for constipation. Dressing changed over HMV site.     Hospital Course:   8/12: POD 0 s/p revision T10-L2 fusion, laminectomy T2/L1. DC precedex for bradycardia to 40s. Zofran DC'd for borderline QTc. Atropine @ bedside for 1st deg heart block. Warmed IVF, bearhugger for hypothermia 90F w/ improvement. DC'd ketamine gtt for somnolence.   8/13: POD 1 revision T10-L2 fusion, T2/L1 lami. TRANG overnight. 500cc bolus for soft SBP. hematuria due pt pulling on rothman.   8/14: POD2 s/p revision T10-L2 fusion, T2/L1 lami. Significant pain overnight, continued Oxy 10/15mg PRN, methadone 2.5mg q8hrs, given ambien and Xanax PRN overnight for anxiety, CP w/u with EKG stable, HR stable and ME interval improved. Cont to f/u BH recs. Pain mgmt following, need adjustment in pain recs. Pend postop xrays and LE dopplers. Pend AR. Dc'd methadone per pain recs. LE dopplers negative  8/15: POD3. TRANG o/n, APLS labs sent. post op xrays complete. oxy and gabapentin held due to oversedation   8/16: POD4. refused mirtazipine o/n. decreased oxy to 2.5 for moderate pain, HMV dc. Given xanax 0.5mg for anxiety. Given lactulose 10mg x 1 for constipation. Dressing over HMV changed.   8/17: POD5, TRANG o/n.     Vital Signs Last 24 Hrs  T(C): 36.4 (16 Aug 2024 22:07), Max: 36.9 (16 Aug 2024 04:53)  T(F): 97.5 (16 Aug 2024 22:07), Max: 98.5 (16 Aug 2024 04:53)  HR: 88 (16 Aug 2024 22:38) (66 - 92)  BP: 144/67 (16 Aug 2024 22:38) (117/56 - 144/67)  BP(mean): 97 (16 Aug 2024 22:38) (81 - 110)  RR: 17 (16 Aug 2024 22:38) (13 - 18)  SpO2: 97% (16 Aug 2024 22:38) (95% - 98%)    Parameters below as of 16 Aug 2024 22:38  Patient On (Oxygen Delivery Method): room air        I&O's Summary    15 Aug 2024 07:01  -  16 Aug 2024 07:00  --------------------------------------------------------  IN: 575 mL / OUT: 1495 mL / NET: -920 mL    16 Aug 2024 07:01  -  17 Aug 2024 00:00  --------------------------------------------------------  IN: 0 mL / OUT: 20 mL / NET: -20 mL        PHYSICAL EXAM:  Constitutional: NAD, resting comfortably in bed.   HEENT: Pupils equal, round, reactive to light. EOMI. Face symmetric, tongue midline.  Respiratory: No respiratory distress, lungs clear to auscultation bilaterally.   Cardiovascular: RRR, S1, S2.   Gastrointestinal: Abdomen soft, non tender, nondistended.  Neurological:  AAOX3. Opens eyes. Follows commands. Speech clear.  Cranial Nerves: II-XII intact  Motor: 5/5 power in b/l UE and LE  Sensation: intact to light touch in all extremities.   Pronator Drift: none  Dysmetria: none  Extremities: Warm, well perfused.  Wounds: thoracolumbar incision covered with aquacel    TUBES/LINES:  [] Rothman  [] Lumbar Drain  [x] Wound Drains - 1 EZEKIEL  [] Others    DIET:  [] NPO  [x] Mechanical  [] Tube feeds    LABS:  CAPILLARY BLOOD GLUCOSE  Drug Levels: [] N/A  CSF Analysis: [] N/A    Allergies  latex (Blisters)  No Known Drug Allergies  Intolerances    MEDICATIONS:  Antibiotics:    Neuro:  acetaminophen     Tablet .. 1000 milliGRAM(s) Oral every 8 hours  methocarbamol 500 milliGRAM(s) Oral every 8 hours  mirtazapine 15 milliGRAM(s) Oral at bedtime  oxyCODONE    IR 5 milliGRAM(s) Oral every 4 hours PRN  oxyCODONE    IR 2.5 milliGRAM(s) Oral every 4 hours PRN    Anticoagulation:  heparin   Injectable 5000 Unit(s) SubCutaneous every 8 hours    OTHER:  allopurinol 100 milliGRAM(s) Oral daily  atorvastatin 80 milliGRAM(s) Oral at bedtime  atropine Injectable 1 milliGRAM(s) IntraMuscular once PRN  benzocaine/menthol Lozenge 1 Lozenge Oral four times a day PRN  budesonide 160 MICROgram(s)/formoterol 4.5 MICROgram(s) Inhaler 2 Puff(s) Inhalation once  finasteride 5 milliGRAM(s) Oral daily  insulin lispro (ADMELOG) corrective regimen sliding scale   SubCutaneous every 6 hours  lidocaine   4% Patch 1 Patch Transdermal every 24 hours  lidocaine   4% Patch 1 Patch Transdermal every 24 hours  naloxone Injectable 0.4 milliGRAM(s) IV Push once PRN  polyethylene glycol 3350 17 Gram(s) Oral two times a day  senna 2 Tablet(s) Oral at bedtime  tamsulosin 0.4 milliGRAM(s) Oral at bedtime    IVF:  multivitamin 1 Tablet(s) Oral daily    CULTURES:    RADIOLOGY & ADDITIONAL TESTS:      ASSESSMENT:  83 y/o male with hx HTN, HLD, BPH,  T2DM (Hgba1c 6.7), CAD s/p stent about 4 yrs ago (on Plavix), +antiphospholipid antibody w history of B/L LE DVT was on Xarelto (4/2023 dx), CKD, Nephrolithiasis, Gout, Interstitial Lung Disease, L1-S1 fusion in 2020 @[facility]. Was worked up for severe, worsening low back pain, worse with ambulation. 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 Now s/p revision T10-L2 fusion, laminectomy T2/L1 (8/12).    Neuro:  - Neuro/vitals q4hr  - Pain control: oxy 2.5/5, tylenol standing, robaxin 500q8, gabapentin 100q8, lidocaine patches (holding home buprenorphine) (silverio and oxy 10 held 8/15 d/t oversedation)  - pain mgmt following  - HMV x1 dc 8/16, EZEKIEL x1, per plastics  - Insomnia: Mirtazapine 15mg QD for insomnia/depression (per psych), xanax 0.5mg PO prn  - postop standing XRs complete    Cardio:  - SBP <160  - EKG 8/12: 1st degree heart block  - Hx cardiac stents: holding home ASA, plavix   - HLD: atorvastatin 80mg qd    Pulm:  - RA  - ILD: continue home Symbicort     GI:  - Regular  - Bowel regimen, pending BM    Renal:  - IVL  - uretral stricture, hematura due to patient tugging on rothman (rothman d/c 8/13)  - retaining, pt refusing SC (risks explained)  - CKD: trend Cr   - BPH: home finaseteride, flomax  - Gout: allopurinol 100mg daily    Heme:  - DVT ppx: SCDs, SQH  - hx b/l LE DVTs: LE dopplers 8/14: no DVT     ID:  - afebrile    Dispo: Stepdown, full code, pending AR    D/w Dr Hernandez   HPI:  85 y/o male with hx HTN, HLD, BPH,  T2DM (Hgba1c 6.7), CAD s/p stent~ 4 yrs ago (on Plavix), +antiphospholipid antibody w history of B/L LE DVT was on elliquis (4/2023 dx), CKD, Nephrolithiasis, Gout, Interstitial Lung Disease, L1-S1 fusion in 2020 @[facility].   Hx lower back pain radiating to right buttocks. He denies pain radiating to the lower extremities. Currently he can walk for approximately 1 block until the pain becomes debilitating and he has to stop. He also finds himself hunched over when ambulating. 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   He denies any urinary/bowel dysfunction, saddle anesthesia. Today patient also reports that he did not stop the suboxone as instructed (was supposed to stop 3 days preop) because he didn't want to take oxycodone.   (12 Aug 2024 06:34)    OVERNIGHT EVENTS: Given xanax for anxiety. Given lactulose 10g x 1 for constipation. Dressing changed over V site. Patient became restless, attempting to get out of bed stating he has to sit in the chair. Moved to the chair with assistance by nursing staff. Pt states he is having back pain but refusing any pain medication at this time, offered multiple options for pain management. Refusing repositioning in chair including adding pillows or putting up legs. Pt refusing to sit further back in chair or allow nursing to place bed alarm underneath him. Pt educated on the need for bed alarm and sitting back fully in the chair for his safety. Continues to refuse all interventions, stating none of it will help. Pt on 1:1 monitoring.     Hospital Course:   8/12: POD 0 s/p revision T10-L2 fusion, laminectomy T2/L1. DC precedex for bradycardia to 40s. Zofran DC'd for borderline QTc. Atropine @ bedside for 1st deg heart block. Warmed IVF, bearhugger for hypothermia 90F w/ improvement. DC'd ketamine gtt for somnolence.   8/13: POD 1 revision T10-L2 fusion, T2/L1 lami. TRANG overnight. 500cc bolus for soft SBP. hematuria due pt pulling on rothman.   8/14: POD2 s/p revision T10-L2 fusion, T2/L1 lami. Significant pain overnight, continued Oxy 10/15mg PRN, methadone 2.5mg q8hrs, given ambien and Xanax PRN overnight for anxiety, CP w/u with EKG stable, HR stable and FL interval improved. Cont to f/u BH recs. Pain mgmt following, need adjustment in pain recs. Pend postop xrays and LE dopplers. Pend AR. Dc'd methadone per pain recs. LE dopplers negative  8/15: POD3. TRANG o/n, APLS labs sent. post op xrays complete. oxy and gabapentin held due to oversedation   8/16: POD4. refused mirtazipine o/n. decreased oxy to 2.5 for moderate pain, HMV dc. Given xanax 0.5mg for anxiety. Given lactulose 10mg x 1 for constipation. Dressing over HMV changed.   8/17: POD5, TRANG o/n.     Vital Signs Last 24 Hrs  T(C): 36.4 (16 Aug 2024 22:07), Max: 36.9 (16 Aug 2024 04:53)  T(F): 97.5 (16 Aug 2024 22:07), Max: 98.5 (16 Aug 2024 04:53)  HR: 88 (16 Aug 2024 22:38) (66 - 92)  BP: 144/67 (16 Aug 2024 22:38) (117/56 - 144/67)  BP(mean): 97 (16 Aug 2024 22:38) (81 - 110)  RR: 17 (16 Aug 2024 22:38) (13 - 18)  SpO2: 97% (16 Aug 2024 22:38) (95% - 98%)  Parameters below as of 16 Aug 2024 22:38  Patient On (Oxygen Delivery Method): room air    I&O's Summary    15 Aug 2024 07:01  -  16 Aug 2024 07:00  --------------------------------------------------------  IN: 575 mL / OUT: 1495 mL / NET: -920 mL    16 Aug 2024 07:01  -  17 Aug 2024 00:00  --------------------------------------------------------  IN: 0 mL / OUT: 20 mL / NET: -20 mL    PHYSICAL EXAM:  Constitutional: NAD, resting comfortably in bed.   HEENT: Pupils equal, round, reactive to light. EOMI. Face symmetric, tongue midline.  Respiratory: No respiratory distress, lungs clear to auscultation bilaterally.   Cardiovascular: RRR, S1, S2.   Gastrointestinal: Abdomen soft, non tender, nondistended.  Neurological:  AAOX3. Opens eyes. Follows commands. Speech clear.  Cranial Nerves: II-XII intact  Motor: 5/5 power in b/l UE and LE  Sensation: intact to light touch in all extremities.   Pronator Drift: none  Dysmetria: none  Extremities: Warm, well perfused.  Wounds: thoracolumbar incision covered with aquacel    TUBES/LINES:  [] Rothman  [] Lumbar Drain  [x] Wound Drains - 1 EZEKIEL  [] Others    DIET:  [] NPO  [x] Mechanical  [] Tube feeds    LABS:  CAPILLARY BLOOD GLUCOSE  Drug Levels: [] N/A  CSF Analysis: [] N/A    Allergies  latex (Blisters)  No Known Drug Allergies  Intolerances    MEDICATIONS:  Antibiotics:    Neuro:  acetaminophen     Tablet .. 1000 milliGRAM(s) Oral every 8 hours  methocarbamol 500 milliGRAM(s) Oral every 8 hours  mirtazapine 15 milliGRAM(s) Oral at bedtime  oxyCODONE    IR 5 milliGRAM(s) Oral every 4 hours PRN  oxyCODONE    IR 2.5 milliGRAM(s) Oral every 4 hours PRN    Anticoagulation:  heparin   Injectable 5000 Unit(s) SubCutaneous every 8 hours    OTHER:  allopurinol 100 milliGRAM(s) Oral daily  atorvastatin 80 milliGRAM(s) Oral at bedtime  atropine Injectable 1 milliGRAM(s) IntraMuscular once PRN  benzocaine/menthol Lozenge 1 Lozenge Oral four times a day PRN  budesonide 160 MICROgram(s)/formoterol 4.5 MICROgram(s) Inhaler 2 Puff(s) Inhalation once  finasteride 5 milliGRAM(s) Oral daily  insulin lispro (ADMELOG) corrective regimen sliding scale   SubCutaneous every 6 hours  lidocaine   4% Patch 1 Patch Transdermal every 24 hours  lidocaine   4% Patch 1 Patch Transdermal every 24 hours  naloxone Injectable 0.4 milliGRAM(s) IV Push once PRN  polyethylene glycol 3350 17 Gram(s) Oral two times a day  senna 2 Tablet(s) Oral at bedtime  tamsulosin 0.4 milliGRAM(s) Oral at bedtime    IVF:  multivitamin 1 Tablet(s) Oral daily    CULTURES:    RADIOLOGY & ADDITIONAL TESTS:      ASSESSMENT:  85 y/o male with hx HTN, HLD, BPH,  T2DM (Hgba1c 6.7), CAD s/p stent about 4 yrs ago (on Plavix), +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. Was worked up for severe, worsening low back pain, worse with ambulation. 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 Now s/p revision T10-L2 fusion, laminectomy T2/L1 (8/12).    Neuro:  - Neuro/vitals q4hr  - Pain control: oxy 2.5/5, tylenol standing, robaxin 500q8, gabapentin 100q8, lidocaine patches (holding home buprenorphine) (silverio and oxy 10 held 8/15 d/t oversedation)  - pain mgmt following  - HMV x1 dc 8/16, EZEKIEL x1, per plastics  - Insomnia: Mirtazapine 15mg QD for insomnia/depression (per psych), xanax 0.5mg PO prn  - postop standing XRs complete    Cardio:  - SBP <160  - EKG 8/12: 1st degree heart block  - Hx cardiac stents: holding home ASA, plavix   - HLD: atorvastatin 80mg qd    Pulm:  - RA  - ILD: continue home Symbicort     GI:  - Regular  - Bowel regimen, pending BM    Renal:  - IVL  - uretral stricture, hematura due to patient tugging on rothman (rothman d/c 8/13)  - retaining, pt refusing SC (risks explained)  - CKD: trend Cr   - BPH: home finaseteride, flomax  - Gout: allopurinol 100mg daily    Heme:  - DVT ppx: SCDs, SQH  - hx b/l LE DVTs: LE dopplers 8/14: no DVT     ID:  - afebrile    Dispo: Stepdown, full code, pending AR    D/w Dr Hernandez

## 2024-08-17 NOTE — PROGRESS NOTE ADULT - SUBJECTIVE AND OBJECTIVE BOX
Patient was seen and examined at bedside. Case discuss with the primary team. Pt continues to refuse labs this morning. In addition the pt was threatening to leave.  Pt reports feeling cold this morning. Pt still without a BM however the pt has been refusing bowel regimen at times.     OBJECTIVE:  Vital Signs Last 24 Hrs  T(C): 36.9 (17 Aug 2024 14:01), Max: 37 (17 Aug 2024 05:00)  T(F): 98.5 (17 Aug 2024 14:01), Max: 98.6 (17 Aug 2024 05:00)  HR: 86 (17 Aug 2024 11:47) (72 - 96)  BP: 141/65 (17 Aug 2024 11:47) (123/72 - 144/67)  BP(mean): 94 (17 Aug 2024 11:47) (85 - 97)  RR: 18 (17 Aug 2024 11:47) (17 - 20)  SpO2: 96% (17 Aug 2024 11:47) (96% - 98%)    Parameters below as of 17 Aug 2024 11:47  Patient On (Oxygen Delivery Method): room air    PHYSICAL EXAM:  Constitutional: NAD, elderly male laying in bed; family at bedside  HEENT: EOMI, MMM  Neck: Supple  Respiratory: CTA B/L, no w/r/r  Cardiovascular: RRR, normal S1 and S2  Gastrointestinal: mild RLQ tenderness no rebound or guarding   Extremities: . + minimal b/l LE pitting edema.  Neurological: AAOx3, LE strength limited by pain     allopurinol 100 milliGRAM(s) Oral daily  ALPRAZolam 0.5 milliGRAM(s) Oral two times a day PRN  atorvastatin 80 milliGRAM(s) Oral at bedtime  atropine Injectable 1 milliGRAM(s) IntraMuscular once PRN  benzocaine/menthol Lozenge 1 Lozenge Oral four times a day PRN  bisacodyl 5 milliGRAM(s) Oral every 12 hours  budesonide 160 MICROgram(s)/formoterol 4.5 MICROgram(s) Inhaler 2 Puff(s) Inhalation once  finasteride 5 milliGRAM(s) Oral daily  heparin   Injectable 5000 Unit(s) SubCutaneous every 8 hours  insulin lispro (ADMELOG) corrective regimen sliding scale   SubCutaneous every 6 hours  lactulose Syrup 10 Gram(s) Oral daily  lidocaine   4% Patch 1 Patch Transdermal every 24 hours  lidocaine   4% Patch 1 Patch Transdermal every 24 hours  methocarbamol 500 milliGRAM(s) Oral every 8 hours  mirtazapine 15 milliGRAM(s) Oral at bedtime  multivitamin 1 Tablet(s) Oral daily  naloxone Injectable 0.4 milliGRAM(s) IV Push once PRN  polyethylene glycol 3350 17 Gram(s) Oral two times a day  senna 2 Tablet(s) Oral at bedtime  tamsulosin 0.8 milliGRAM(s) Oral at bedtime

## 2024-08-17 NOTE — PROVIDER CONTACT NOTE (OTHER) - SITUATION
Pt states she is excruciation pain
Pt is refusing care, sitting in hallway connected to portable monitor, complaining of epigastric pain 6/10 and nausea, Aquacel dressing on lower back is coming loose
Patient refused intermittent cath
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
## 2024-08-17 NOTE — PROVIDER CONTACT NOTE (OTHER) - REASON
Patient refused intermittent cath
Pt refusing to get back to bed, sitting in hallway connected to portable monitor, complaining of epigastric pain 6/10 and nausea, refuses pain medication, Aquacel dressing on lower back is coming loose
Pain

## 2024-08-17 NOTE — PROGRESS NOTE ADULT - ASSESSMENT
85 y/o male with hx HTN, HLD, BPH, T2DM (Hgba1c 6.7), CAD s/p remote stent to LAD (on Plavix), DVT b/l LEs in 4/23 with + Cardiolipin, CKD, Nephrolithiasis, Gout, Interstitial Lung Disease, L1-S1 fusion in 2020 @[facility] who was worked up for severe, worsening low back pain, worse with ambulation. 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 Now s/p revision T10-L2 fusion, laminectomy T2/L1 (8/12)    #Severe lumbar degenerative disease  #Post op state  s/p revision T10-L2 fusion, laminectomy T2/L1 (8/12)  #Constipation   - pain control per primary team and pain management  -Continue Miralax, Senna and Lactulose   -Continue drain management as per NSGY team   - encourage incentive spirometer, OOB as tolerated    #CAD with remote PCI  -Pt to  resume Plavix when safe from neurosurgery  standpoint  - c/w Lipitor 80mg    #DM  - well controlled, A1c < 7  - Continue ISS    #Hx of B/l DVT  #Cardiolipin +  - DVTs in 4/23  - has been on Eliquis 5mg BID  - dopplers negative  - Charts reviewed, anticardiolipin antibody + in April 2023 after dx with DVTs, was on DOAC at the time of testing. Possibly false positive.  Has been recommended for heme referral since then but has not established or had repeat testing, DOAC held currently but possibly should be on Warfarin.  Would repeat testing now that he is off DOAC but patient refusing all blood work at this time.  -Will check anticardiolipin antibody if pt allows   #Depression  - BH following, appreciate recs. C/w mirtazepine 15mg, started this admit    #HLD  - c/w Lipitor 80    #HTN  - The pt was previously was on Toprol 25mg, Enalapril 5mg, Amlodipine 5mg and Lasix 80mg BID but having episodes of hypotension.   - Currently holding  antihypertensives and will resume as needed/tolerate    #BPH with urinary retention  -Pt s/p  rothman removed  - c/w Finasteride and Flomax  - Pt continues to have urinary retention with > 500cc post void residual; Will continue straight cath interventions if pt allows  -Urology co    #ILD  - Pt to follow up with Pulmonary   - c/w Symbicort    #Gout  - c/w home allopurinol    #CKD  - Pt's creatinine remains stable  around 1.8  -Pt  follows outpt with Dr Araujo  - Will continue to monitor creatinine if pt allows     #DVT ppx  -Continue  Hep sub Q    #DISPO  -Pt was seen by PT and recommended for Acute rehab     40 minutes spent on total encounter. The necessity of the time spent during the encounter on this date of service was due to:    Review of hospital course, labs, vitals, radiology and medical records.  Direct patient encounter including bedside exam   Discussed plan of care with primary team   Documenting the encounter.   85 y/o male with hx HTN, HLD, BPH, T2DM (Hgba1c 6.7), CAD s/p remote stent to LAD (on Plavix), DVT b/l LEs in 4/23 with + Cardiolipin, CKD, Nephrolithiasis, Gout, Interstitial Lung Disease, L1-S1 fusion in 2020 @[facility] who was worked up for severe, worsening low back pain, worse with ambulation. 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 Now s/p revision T10-L2 fusion, laminectomy T2/L1 (8/12)    #Severe lumbar degenerative disease  #Post op state  s/p revision T10-L2 fusion, laminectomy T2/L1 (8/12)  #Constipation   - pain control per primary team and pain management  -Continue Miralax, Senna and Lactulose   -Continue drain management as per NSGY team   - encourage incentive spirometer, OOB as tolerated    #CAD with remote PCI  -Pt to  resume Plavix when safe from neurosurgery  standpoint  - c/w Lipitor 80mg    #DM  - well controlled, A1c < 7  - Continue ISS    #Hx of B/l DVT  #Cardiolipin +  - DVTs in 4/23  - has been on Eliquis 5mg BID  - dopplers negative  - Charts reviewed, anticardiolipin antibody + in April 2023 after dx with DVTs, was on DOAC at the time of testing. Possibly false positive.  Has been recommended for heme referral since then but has not established or had repeat testing, DOAC held currently but possibly should be on Warfarin.  Would repeat testing now that he is off DOAC but patient refusing all blood work at this time.  -Will check anticardiolipin antibody if pt allows     #Depression  - BH following, appreciate recs. C/w mirtazepine 15mg, started this admit    #HLD  - c/w Lipitor 80    #HTN  - The pt was previously was on Toprol 25mg, Enalapril 5mg, Amlodipine 5mg and Lasix 80mg BID but having episodes of hypotension.   - Currently holding  antihypertensives and will resume as needed/tolerate    #BPH with urinary retention  -Pt s/p  rothman removed  - c/w Finasteride and Flomax  - Pt continues to have urinary retention with > 500cc post void residual; Will continue straight cath interventions if pt allows  -Urology co    #ILD  - Pt to follow up with Pulmonary   - c/w Symbicort    #Gout  - c/w home allopurinol    #CKD  - Pt's creatinine remains stable  around 1.8  -Pt  follows outpt with Dr Araujo  - Will continue to monitor creatinine if pt allows     #DVT ppx  -Continue  Hep sub Q    #DISPO  -Pt was seen by PT and recommended for Acute rehab     40 minutes spent on total encounter. The necessity of the time spent during the encounter on this date of service was due to:    Review of hospital course, labs, vitals, radiology and medical records.  Direct patient encounter including bedside exam   Discussed plan of care with primary team   Documenting the encounter.

## 2024-08-17 NOTE — PROVIDER CONTACT NOTE (OTHER) - ASSESSMENT
Pt Davis was removed 8/13, was due to void at 9 pm 8/13, PT didn't urinate, PT bladder scan 2x by the night RN not enough urine to straight cath as per night RN, Bladder scan done at 10 am pt had >400 urine, PT was educated on the need to be straight cath to relieve pressure from the bladder and prevention of UTI patient refused and wanted to try to urinate via the urinal, at 12pm pt urinated 100c bladder scan repeated >500 urine in the bladder, pt refused straight cath, education provided, bladder scan done again at 2 pm >600 urine patient was educated a 3nd time on the importance of intermittent cath patient refused, team made aware each time of patient  refusal.
pt a&ox4, VSS, NSR, RA. pt c/o of chest pain and 10/`10 pain in back incision.
Pt refusing to get back to bed, sitting in hallway connected to portable monitor, complaining of epigastric pain 6/10 and nausea, refuses pain medication, Aquacel dressing on lower back is coming loose. Neuro exam stable and at baseline. VSS and charted in flow sheet at 9am. Pt is refusing EKG.

## 2024-08-17 NOTE — PROVIDER CONTACT NOTE (OTHER) - BACKGROUND
s/p T10-L2 fusion on 8/12. Was endorsed by night shift RN that pt has been refusing treatment. Pt has been seen multiple time during night shift by provider.
S/p Laminectomy

## 2024-08-17 NOTE — PROVIDER CONTACT NOTE (OTHER) - ACTION/TREATMENT ORDERED:
DOMONIQUE Catalan came to bedside educated patient on the importance of straight cath, patient refused
Pain medication changed. gabapentin, Methadone, oxycodone ordered
Spoke w/ DOMONIQUE Ledbetter and was told that pt has had multiple complaints of chest pain during admission due to anxiety and reflux. Pt refusing pain meds. Refused fingerstick. Provider notified.

## 2024-08-18 LAB — GLUCOSE BLDC GLUCOMTR-MCNC: 159 MG/DL — HIGH (ref 70–99)

## 2024-08-18 PROCEDURE — 99232 SBSQ HOSP IP/OBS MODERATE 35: CPT

## 2024-08-18 PROCEDURE — 99231 SBSQ HOSP IP/OBS SF/LOW 25: CPT

## 2024-08-18 RX ORDER — OXYCODONE HYDROCHLORIDE 5 MG/1
5 TABLET ORAL ONCE
Refills: 0 | Status: DISCONTINUED | OUTPATIENT
Start: 2024-08-18 | End: 2024-08-18

## 2024-08-18 RX ORDER — TRAMADOL HYDROCHLORIDE 200 MG/1
25 TABLET, EXTENDED RELEASE ORAL ONCE
Refills: 0 | Status: DISCONTINUED | OUTPATIENT
Start: 2024-08-18 | End: 2024-08-18

## 2024-08-18 RX ORDER — SENNA 187 MG
2 TABLET ORAL
Refills: 0 | Status: DISCONTINUED | OUTPATIENT
Start: 2024-08-18 | End: 2024-08-20

## 2024-08-18 RX ORDER — BACITRACIN 500 UNIT/G
1 OINTMENT (GRAM) TOPICAL
Refills: 0 | Status: DISCONTINUED | OUTPATIENT
Start: 2024-08-18 | End: 2024-08-20

## 2024-08-18 RX ORDER — TRAMADOL HYDROCHLORIDE 200 MG/1
50 TABLET, EXTENDED RELEASE ORAL ONCE
Refills: 0 | Status: DISCONTINUED | OUTPATIENT
Start: 2024-08-18 | End: 2024-08-19

## 2024-08-18 RX ADMIN — Medication 1 PATCH: at 11:28

## 2024-08-18 RX ADMIN — Medication 10 MILLIGRAM(S): at 11:16

## 2024-08-18 RX ADMIN — Medication 1 PATCH: at 21:35

## 2024-08-18 RX ADMIN — Medication 5 MILLIGRAM(S): at 05:43

## 2024-08-18 RX ADMIN — POLYETHYLENE GLYCOL 3350 17 GRAM(S): 17 POWDER, FOR SOLUTION ORAL at 17:12

## 2024-08-18 RX ADMIN — Medication 10 GRAM(S): at 11:16

## 2024-08-18 RX ADMIN — Medication 1 PATCH: at 07:23

## 2024-08-18 RX ADMIN — METHOCARBAMOL 500 MILLIGRAM(S): 750 TABLET, FILM COATED ORAL at 15:09

## 2024-08-18 RX ADMIN — TRAMADOL HYDROCHLORIDE 25 MILLIGRAM(S): 200 TABLET, EXTENDED RELEASE ORAL at 21:10

## 2024-08-18 RX ADMIN — Medication 5000 UNIT(S): at 15:09

## 2024-08-18 RX ADMIN — Medication 5 MILLIGRAM(S): at 17:13

## 2024-08-18 RX ADMIN — Medication 0.5 MILLIGRAM(S): at 11:50

## 2024-08-18 RX ADMIN — Medication 1 PATCH: at 21:34

## 2024-08-18 RX ADMIN — Medication 5000 UNIT(S): at 21:09

## 2024-08-18 RX ADMIN — Medication 1 APPLICATION(S): at 17:12

## 2024-08-18 RX ADMIN — METHOCARBAMOL 500 MILLIGRAM(S): 750 TABLET, FILM COATED ORAL at 21:30

## 2024-08-18 RX ADMIN — ZOLPIDEM TARTRATE 5 MILLIGRAM(S): 5 TABLET, FILM COATED ORAL at 00:08

## 2024-08-18 RX ADMIN — Medication 100 MILLIGRAM(S): at 11:16

## 2024-08-18 RX ADMIN — TAMSULOSIN HYDROCHLORIDE 0.8 MILLIGRAM(S): 0.4 CAPSULE ORAL at 21:30

## 2024-08-18 RX ADMIN — TRAMADOL HYDROCHLORIDE 25 MILLIGRAM(S): 200 TABLET, EXTENDED RELEASE ORAL at 22:10

## 2024-08-18 RX ADMIN — Medication 1 TABLET(S): at 11:16

## 2024-08-18 RX ADMIN — Medication 0.5 MILLIGRAM(S): at 21:30

## 2024-08-18 RX ADMIN — METHOCARBAMOL 500 MILLIGRAM(S): 750 TABLET, FILM COATED ORAL at 05:44

## 2024-08-18 RX ADMIN — Medication 80 MILLIGRAM(S): at 21:30

## 2024-08-18 RX ADMIN — FINASTERIDE 5 MILLIGRAM(S): 1 TABLET, FILM COATED ORAL at 11:16

## 2024-08-18 RX ADMIN — Medication 2 TABLET(S): at 17:12

## 2024-08-18 RX ADMIN — Medication 1 PATCH: at 11:10

## 2024-08-18 NOTE — PROGRESS NOTE ADULT - SUBJECTIVE AND OBJECTIVE BOX
Patient was seen and examined at bedside. Case discuss with the primary team.     OBJECTIVE:  Vital Signs Last 24 Hrs  T(C): 36.5 (18 Aug 2024 13:31), Max: 36.9 (17 Aug 2024 14:01)  T(F): 97.7 (18 Aug 2024 13:31), Max: 98.5 (17 Aug 2024 14:01)  HR: 70 (18 Aug 2024 11:23) (70 - 86)  BP: 144/71 (18 Aug 2024 11:23) (125/88 - 152/66)  BP(mean): 101 (18 Aug 2024 11:23) (91 - 102)  RR: 17 (18 Aug 2024 11:23) (17 - 20)  SpO2: 97% (18 Aug 2024 11:23) (96% - 100%)    Parameters below as of 18 Aug 2024 11:23  Patient On (Oxygen Delivery Method): room air    PHYSICAL EXAM:  Constitutional: NAD, elderly male laying in bed; family at bedside  HEENT: EOMI, MMM  Neck: Supple  Respiratory: CTA B/L, no w/r/r  Cardiovascular: RRR, normal S1 and S2  Gastrointestinal: mild abdominal  tenderness no rebound or guarding   Extremities: . + minimal b/l LE pitting edema.  Neurological: AAOx3, LE strength limited by pain   + EZEKIEL drain in place       CAPILLARY BLOOD GLUCOSE  POCT Blood Glucose.: 159 mg/dL (18 Aug 2024 12:17)    acetaminophen     Tablet .. 650 milliGRAM(s) Oral every 6 hours PRN  allopurinol 100 milliGRAM(s) Oral daily  ALPRAZolam 0.5 milliGRAM(s) Oral two times a day PRN  atorvastatin 80 milliGRAM(s) Oral at bedtime  bacitracin   Ointment 1 Application(s) Topical two times a day  benzocaine/menthol Lozenge 1 Lozenge Oral four times a day PRN  bisacodyl 5 milliGRAM(s) Oral every 12 hours  budesonide 160 MICROgram(s)/formoterol 4.5 MICROgram(s) Inhaler 2 Puff(s) Inhalation once  finasteride 5 milliGRAM(s) Oral daily  heparin   Injectable 5000 Unit(s) SubCutaneous every 8 hours  insulin lispro (ADMELOG) corrective regimen sliding scale   SubCutaneous every 6 hours  lactulose Syrup 10 Gram(s) Oral daily  lidocaine   4% Patch 1 Patch Transdermal every 24 hours  lidocaine   4% Patch 1 Patch Transdermal every 24 hours  methocarbamol 500 milliGRAM(s) Oral every 8 hours  multivitamin 1 Tablet(s) Oral daily  naloxone Injectable 0.4 milliGRAM(s) IV Push once PRN  polyethylene glycol 3350 17 Gram(s) Oral two times a day  senna 2 Tablet(s) Oral two times a day  tamsulosin 0.8 milliGRAM(s) Oral at bedtime  zolpidem 5 milliGRAM(s) Oral at bedtime PRN

## 2024-08-18 NOTE — PROGRESS NOTE ADULT - ASSESSMENT
85 y/o male with hx HTN, HLD, BPH, T2DM (Hgba1c 6.7), CAD s/p remote stent to LAD (on Plavix), DVT b/l LEs in 4/23 with + Cardiolipin, CKD, Nephrolithiasis, Gout, Interstitial Lung Disease, L1-S1 fusion in 2020 @[facility] who was worked up for severe, worsening low back pain, worse with ambulation. 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 Now s/p revision T10-L2 fusion, laminectomy T2/L1 (8/12)    #Severe lumbar degenerative disease  #Post op state  s/p revision T10-L2 fusion, laminectomy T2/L1 (8/12)  #Constipation   - Pain control per primary team and pain management  -Continue Miralax, Senna and Lactulose; pt is for a suppository today   -Continue drain management as per NSGY team   - Encourage incentive spirometer, OOB as tolerated    #CAD with remote PCI  #HLD  -Pt to resume Plavix when safe from neurosurgery standpoint  - Continue atorvastatin     #DM  - well controlled, A1c < 7  - Continue to monitor FS ( WNL) and ISS    #Hx of B/l DVT  #Cardiolipin +  - DVTs in 4/23  - Pt has been on Eliquis 5mg BID in the past   - Charts reviewed, anticardiolipin antibody + in April 2023 after dx with DVTs, was on DOAC at the time of testing. Possibly false positive.  Has been recommended for heme referral since then but has not established or had repeat testing, DOAC held currently but possibly should be on Warfarin.  Would repeat testing now that he is off DOAC but patient refusing all blood work at this time.  -Will check anticardiolipin antibody if pt allows     #Depression  #Anxiety   - Pt was seen by behavioral health during this admission   -Continue Alprazolam prn     #HTN  - The pt was previously was on Toprol 25mg, Enalapril 5mg, Amlodipine 5mg daily  and Lasix 80mg BID but having episodes of hypotension.   - Currently holding  antihypertensives and will resume as needed/tolerate    #BPH with urinary retention  #Ureheral stricture   - c/w Finasteride and Flomax  - Pt continues to have urinary retention with > 500cc post void residual; Will continue straight cath interventions if pt allows  -Urology consulted for uretheral stricture and per urology no intervention needed at this time    #ILD  - Pt to follow up with Pulmonary   - c/w Symbicort    #Gout  - c/w home allopurinol    #CKD  - Pt's creatinine remains stable  around 1.8  -Pt  follows outpt with Dr Araujo  - Will continue to monitor creatinine if pt allows     #DVT ppx  -Continue Heparin  SQ q8     #DISPO  -Pt was seen by PT and recommended for Acute rehab     40 minutes spent on total encounter. The necessity of the time spent during the encounter on this date of service was due to:    Review of hospital course, labs, vitals, radiology and medical records.  Direct patient encounter including bedside exam   Discussed plan of care with primary team   Documenting the encounter.

## 2024-08-18 NOTE — PROGRESS NOTE ADULT - SUBJECTIVE AND OBJECTIVE BOX
HPI:  85 y/o male with hx HTN, HLD, BPH,  T2DM (Hgba1c 6.7), CAD s/p stent~ 4 yrs ago (on Plavix), +antiphospholipid antibody w history of B/L LE DVT was on elliquis (4/2023 dx), CKD, Nephrolithiasis, Gout, Interstitial Lung Disease, L1-S1 fusion in 2020 @[facility].   Hx lower back pain radiating to right buttocks. He denies pain radiating to the lower extremities. Currently he can walk for approximately 1 block until the pain becomes debilitating and he has to stop. He also finds himself hunched over when ambulating. 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   He denies any urinary/bowel dysfunction, saddle anesthesia. Today patient also reports that he did not stop the suboxone as instructed (was supposed to stop 3 days preop) because he didn't want to take oxycodone.   (12 Aug 2024 06:34)    OVERNIGHT EVENTS: Given xanax 0.5mg x 1 for anxiety.     Hospital Course:   8/12: POD 0 s/p revision T10-L2 fusion, laminectomy T2/L1. DC precedex for bradycardia to 40s. Zofran DC'd for borderline QTc. Atropine @ bedside for 1st deg heart block. Warmed IVF, bearhugger for hypothermia 90F w/ improvement. DC'd ketamine gtt for somnolence.   8/13: POD 1 revision T10-L2 fusion, T2/L1 lami. TRANG overnight. 500cc bolus for soft SBP. hematuria due pt pulling on rothman.   8/14: POD2 s/p revision T10-L2 fusion, T2/L1 lami. Significant pain overnight, continued Oxy 10/15mg PRN, methadone 2.5mg q8hrs, given ambien and Xanax PRN overnight for anxiety, CP w/u with EKG stable, HR stable and NY interval improved. Cont to f/u BH recs. Pain mgmt following, need adjustment in pain recs. Pend postop xrays and LE dopplers. Pend AR. Dc'd methadone per pain recs. LE dopplers negative  8/15: POD3. TRANG o/n, APLS labs sent. post op xrays complete. oxy and gabapentin held due to oversedation   8/16: POD4. refused mirtazipine o/n. decreased oxy to 2.5 for moderate pain, HMV dc. Given xanax 0.5mg for anxiety. Given lactulose 10mg x 1 for constipation. Dressing over HMV changed.   8/17: POD5, TRANG o/n. Added lactulose 10mg qd (home med). Attempted to elope in morning, refer to event note. Increased Flomax to 0.8mg in the evening, urology consulted, NTD for urethral stricture, rec rothman if cont straight cath. Given xanax x 1 at bedtime for anxiety.   8/18: POD6.    Vital Signs Last 24 Hrs  T(C): 36.7 (17 Aug 2024 21:21), Max: 37 (17 Aug 2024 05:00)  T(F): 98.1 (17 Aug 2024 21:21), Max: 98.6 (17 Aug 2024 05:00)  HR: 72 (17 Aug 2024 20:41) (72 - 96)  BP: 152/66 (17 Aug 2024 20:41) (123/72 - 152/66)  BP(mean): 95 (17 Aug 2024 20:41) (88 - 95)  RR: 18 (17 Aug 2024 20:41) (18 - 20)  SpO2: 96% (17 Aug 2024 20:41) (96% - 100%)  Parameters below as of 17 Aug 2024 20:41  Patient On (Oxygen Delivery Method): room air    I&O's Summary    16 Aug 2024 07:01  -  17 Aug 2024 07:00  --------------------------------------------------------  IN: 0 mL / OUT: 30 mL / NET: -30 mL    17 Aug 2024 07:01  -  18 Aug 2024 00:04  --------------------------------------------------------  IN: 480 mL / OUT: 960 mL / NET: -480 mL    PHYSICAL EXAM:  Constitutional: NAD, resting comfortably in bed.   HEENT: Pupils equal, round, reactive to light. EOMI. Face symmetric, tongue midline.  Respiratory: No respiratory distress, lungs clear to auscultation bilaterally.   Cardiovascular: RRR, S1, S2.   Gastrointestinal: Abdomen soft, non tender, nondistended.  Neurological:  AAOX3. Opens eyes. Follows commands. Speech clear.  Cranial Nerves: II-XII intact  Motor: 5/5 power in b/l UE and LE  Sensation: intact to light touch in all extremities.   Pronator Drift: none  Dysmetria: none  Extremities: Warm, well perfused.  Wounds: thoracolumbar incision covered with aquacel    TUBES/LINES:  [] Rothman  [] Lumbar Drain  [x] Wound Drains - 1 EZEKIEL  [] Others    DIET:  [] NPO  [x] Mechanical  [] Tube feeds    LABS:    CAPILLARY BLOOD GLUCOSE    Drug Levels: [] N/A  CSF Analysis: [] N/A    Allergies  latex (Blisters)  No Known Drug Allergies  Intolerances    MEDICATIONS:  Antibiotics:    Neuro:  acetaminophen     Tablet .. 650 milliGRAM(s) Oral every 6 hours PRN  ALPRAZolam 0.5 milliGRAM(s) Oral two times a day PRN  methocarbamol 500 milliGRAM(s) Oral every 8 hours  zolpidem 5 milliGRAM(s) Oral at bedtime PRN    Anticoagulation:  heparin   Injectable 5000 Unit(s) SubCutaneous every 8 hours    OTHER:  allopurinol 100 milliGRAM(s) Oral daily  atorvastatin 80 milliGRAM(s) Oral at bedtime  benzocaine/menthol Lozenge 1 Lozenge Oral four times a day PRN  bisacodyl 5 milliGRAM(s) Oral every 12 hours  bisacodyl Suppository 10 milliGRAM(s) Rectal once PRN  budesonide 160 MICROgram(s)/formoterol 4.5 MICROgram(s) Inhaler 2 Puff(s) Inhalation once  finasteride 5 milliGRAM(s) Oral daily  insulin lispro (ADMELOG) corrective regimen sliding scale   SubCutaneous every 6 hours  lactulose Syrup 10 Gram(s) Oral daily  lidocaine   4% Patch 1 Patch Transdermal every 24 hours  lidocaine   4% Patch 1 Patch Transdermal every 24 hours  naloxone Injectable 0.4 milliGRAM(s) IV Push once PRN  polyethylene glycol 3350 17 Gram(s) Oral two times a day  senna 2 Tablet(s) Oral at bedtime  tamsulosin 0.8 milliGRAM(s) Oral at bedtime    IVF:  multivitamin 1 Tablet(s) Oral daily    CULTURES:    RADIOLOGY & ADDITIONAL TESTS:      ASSESSMENT:  85 y/o male with hx HTN, HLD, BPH,  T2DM (Hgba1c 6.7), CAD s/p stent about 4 yrs ago (on Plavix), +antiphospholipid antibody w history of B/L LE DVT was on Xarelto (4/2023 dx), CKD, Nephrolithiasis, Gout, Interstitial Lung Disease, L1-S1 fusion in 2020 @[facility]. Was worked up for severe, worsening low back pain, worse with ambulation. 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 Now s/p revision T10-L2 fusion, laminectomy T2/L1 (8/12).    Neuro:  - Neuro/vitals q4hr  - Pain control: tylenol prn, robaxin 500q8, lidocaine patches, holding gabapentin for oversedation (holding home buprenorphine)  - pain mgmt following  - HMV x1 dc 8/16, EZEKIEL x1, per plastics  - Insomnia/anxiety: Mirtazapine dc'd, ambien 5mg prn, xanax 0.5mg PO prn  - postop standing XRs complete    Cardio:  - SBP <160  - EKG 8/12: 1st degree heart block  - Hx cardiac stents: holding home ASA, plavix (resume POD7)  - HLD: atorvastatin 80mg qd    Pulm:  - RA  - ILD: continue home Symbicort     GI:  - Regular  - Bowel regimen, pending BM    Renal:  - IVL  - uretral stricture, sc prn for retention (pt refusing intermittently)  - urology consulted, rec rothman if continued sc needs  - CKD: trend Cr   - BPH: home finaseteride, flomax  - Gout: allopurinol 100mg daily    Heme:  - DVT ppx: SCDs, SQH  - hx b/l LE DVTs: LE dopplers 8/14: no DVT     ID:  - afebrile    Dispo: Stepdown, full code, pending AR    D/w Dr Hernandez

## 2024-08-19 PROCEDURE — 73610 X-RAY EXAM OF ANKLE: CPT | Mod: 26,RT

## 2024-08-19 PROCEDURE — 99231 SBSQ HOSP IP/OBS SF/LOW 25: CPT

## 2024-08-19 PROCEDURE — 99232 SBSQ HOSP IP/OBS MODERATE 35: CPT

## 2024-08-19 RX ORDER — TRAMADOL HYDROCHLORIDE 200 MG/1
25 TABLET, EXTENDED RELEASE ORAL EVERY 4 HOURS
Refills: 0 | Status: DISCONTINUED | OUTPATIENT
Start: 2024-08-19 | End: 2024-08-19

## 2024-08-19 RX ORDER — TRAMADOL HYDROCHLORIDE 200 MG/1
50 TABLET, EXTENDED RELEASE ORAL EVERY 4 HOURS
Refills: 0 | Status: DISCONTINUED | OUTPATIENT
Start: 2024-08-19 | End: 2024-08-19

## 2024-08-19 RX ORDER — METOPROLOL TARTRATE 100 MG/1
25 TABLET ORAL DAILY
Refills: 0 | Status: DISCONTINUED | OUTPATIENT
Start: 2024-08-19 | End: 2024-08-20

## 2024-08-19 RX ORDER — ACETAMINOPHEN 325 MG/1
1000 TABLET ORAL EVERY 8 HOURS
Refills: 0 | Status: DISCONTINUED | OUTPATIENT
Start: 2024-08-19 | End: 2024-08-20

## 2024-08-19 RX ORDER — ASPIRIN 81 MG
81 TABLET, DELAYED RELEASE (ENTERIC COATED) ORAL DAILY
Refills: 0 | Status: DISCONTINUED | OUTPATIENT
Start: 2024-08-19 | End: 2024-08-19

## 2024-08-19 RX ORDER — TRAMADOL HYDROCHLORIDE 200 MG/1
50 TABLET, EXTENDED RELEASE ORAL ONCE
Refills: 0 | Status: DISCONTINUED | OUTPATIENT
Start: 2024-08-19 | End: 2024-08-19

## 2024-08-19 RX ADMIN — TRAMADOL HYDROCHLORIDE 50 MILLIGRAM(S): 200 TABLET, EXTENDED RELEASE ORAL at 06:43

## 2024-08-19 RX ADMIN — ACETAMINOPHEN 1000 MILLIGRAM(S): 325 TABLET ORAL at 21:15

## 2024-08-19 RX ADMIN — ACETAMINOPHEN 650 MILLIGRAM(S): 325 TABLET ORAL at 07:44

## 2024-08-19 RX ADMIN — ACETAMINOPHEN 650 MILLIGRAM(S): 325 TABLET ORAL at 06:44

## 2024-08-19 RX ADMIN — TRAMADOL HYDROCHLORIDE 50 MILLIGRAM(S): 200 TABLET, EXTENDED RELEASE ORAL at 20:29

## 2024-08-19 RX ADMIN — METOPROLOL TARTRATE 25 MILLIGRAM(S): 100 TABLET ORAL at 12:17

## 2024-08-19 RX ADMIN — Medication 1 APPLICATION(S): at 18:09

## 2024-08-19 RX ADMIN — Medication 1 PATCH: at 09:59

## 2024-08-19 RX ADMIN — Medication 5000 UNIT(S): at 06:41

## 2024-08-19 RX ADMIN — Medication 1 PATCH: at 07:06

## 2024-08-19 RX ADMIN — Medication 1 APPLICATION(S): at 06:40

## 2024-08-19 RX ADMIN — METHOCARBAMOL 500 MILLIGRAM(S): 750 TABLET, FILM COATED ORAL at 06:40

## 2024-08-19 RX ADMIN — Medication 0.5 MILLIGRAM(S): at 18:17

## 2024-08-19 RX ADMIN — Medication 5 MILLIGRAM(S): at 06:40

## 2024-08-19 RX ADMIN — METHOCARBAMOL 500 MILLIGRAM(S): 750 TABLET, FILM COATED ORAL at 14:49

## 2024-08-19 RX ADMIN — Medication 1 PATCH: at 21:28

## 2024-08-19 RX ADMIN — TRAMADOL HYDROCHLORIDE 50 MILLIGRAM(S): 200 TABLET, EXTENDED RELEASE ORAL at 13:34

## 2024-08-19 RX ADMIN — METHOCARBAMOL 500 MILLIGRAM(S): 750 TABLET, FILM COATED ORAL at 21:27

## 2024-08-19 RX ADMIN — Medication 5000 UNIT(S): at 21:27

## 2024-08-19 RX ADMIN — ZOLPIDEM TARTRATE 5 MILLIGRAM(S): 5 TABLET, FILM COATED ORAL at 00:28

## 2024-08-19 RX ADMIN — Medication 80 MILLIGRAM(S): at 21:27

## 2024-08-19 RX ADMIN — TAMSULOSIN HYDROCHLORIDE 0.8 MILLIGRAM(S): 0.4 CAPSULE ORAL at 21:27

## 2024-08-19 RX ADMIN — Medication 5000 UNIT(S): at 14:50

## 2024-08-19 RX ADMIN — Medication 1 TABLET(S): at 12:17

## 2024-08-19 RX ADMIN — TRAMADOL HYDROCHLORIDE 50 MILLIGRAM(S): 200 TABLET, EXTENDED RELEASE ORAL at 21:16

## 2024-08-19 RX ADMIN — Medication 75 MILLIGRAM(S): at 12:19

## 2024-08-19 RX ADMIN — ACETAMINOPHEN 1000 MILLIGRAM(S): 325 TABLET ORAL at 20:28

## 2024-08-19 RX ADMIN — Medication 2 TABLET(S): at 06:40

## 2024-08-19 RX ADMIN — Medication 100 MILLIGRAM(S): at 12:17

## 2024-08-19 RX ADMIN — POLYETHYLENE GLYCOL 3350 17 GRAM(S): 17 POWDER, FOR SOLUTION ORAL at 07:12

## 2024-08-19 RX ADMIN — TRAMADOL HYDROCHLORIDE 50 MILLIGRAM(S): 200 TABLET, EXTENDED RELEASE ORAL at 12:17

## 2024-08-19 RX ADMIN — FINASTERIDE 5 MILLIGRAM(S): 1 TABLET, FILM COATED ORAL at 12:18

## 2024-08-19 RX ADMIN — Medication 1 PATCH: at 21:27

## 2024-08-19 NOTE — PROGRESS NOTE ADULT - SUBJECTIVE AND OBJECTIVE BOX
HPI:  83 y/o male with hx HTN, HLD, BPH,  T2DM (Hgba1c 6.7), CAD s/p stent~ 4 yrs ago (on Plavix), +antiphospholipid antibody w history of B/L LE DVT was on elliquis (4/2023 dx), CKD, Nephrolithiasis, Gout, Interstitial Lung Disease, L1-S1 fusion in 2020 @[facility].   Hx lower back pain radiating to right buttocks. He denies pain radiating to the lower extremities. Currently he can walk for approximately 1 block until the pain becomes debilitating and he has to stop. He also finds himself hunched over when ambulating. 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   He denies any urinary/bowel dysfunction, saddle anesthesia. Today patient also reports that he did not stop the suboxone as instructed (was supposed to stop 3 days preop) because he didn't want to take oxycodone.   (12 Aug 2024 06:34)    OVERNIGHT EVENTS: Complaining of pain, given 25 mg tramadol with minimal relief. Continues to complain of pain but refusing tylenol, oxycodone, higher dose tramadol.     Hospital Course:   8/12: POD 0 s/p revision T10-L2 fusion, laminectomy T2/L1. DC precedex for bradycardia to 40s. Zofran DC'd for borderline QTc. Atropine @ bedside for 1st deg heart block. Warmed IVF, bearhugger for hypothermia 90F w/ improvement. DC'd ketamine gtt for somnolence.   8/13: POD 1 revision T10-L2 fusion, T2/L1 lami. TRANG overnight. 500cc bolus for soft SBP. hematuria due pt pulling on rothman.   8/14: POD2 s/p revision T10-L2 fusion, T2/L1 lami. Significant pain overnight, continued Oxy 10/15mg PRN, methadone 2.5mg q8hrs, given ambien and Xanax PRN overnight for anxiety, CP w/u with EKG stable, HR stable and MN interval improved. Cont to f/u BH recs. Pain mgmt following, need adjustment in pain recs. Pend postop xrays and LE dopplers. Pend AR. Dc'd methadone per pain recs. LE dopplers negative  8/15: POD3. TRANG o/n, APLS labs sent. post op xrays complete. oxy and gabapentin held due to oversedation   8/16: POD4. refused mirtazipine o/n. decreased oxy to 2.5 for moderate pain, HMV dc. Given xanax 0.5mg for anxiety. Given lactulose 10mg x 1 for constipation. Dressing over HMV changed.   8/17: POD5, TRANG o/n. Added lactulose 10mg qd (home med). Attempted to elope in morning, refer to event note. Increased Flomax to 0.8mg in the evening, urology consulted, NTD for urethral stricture, rec rothman if cont straight cath. Given xanax x 1 at bedtime for anxiety.   8/18: POD6. Pt complaining of pain overnight, refusing pain medications. Pending BM, given suppository, +BM. Trialing tramadol for pain. Given ambien and xanax.     Vital Signs Last 24 Hrs  T(C): 36.6 (18 Aug 2024 22:02), Max: 36.8 (18 Aug 2024 05:00)  T(F): 97.8 (18 Aug 2024 22:02), Max: 98.3 (18 Aug 2024 05:00)  HR: 80 (18 Aug 2024 20:34) (64 - 86)  BP: 157/67 (18 Aug 2024 20:34) (125/88 - 158/70)  BP(mean): 97 (18 Aug 2024 20:34) (91 - 102)  RR: 18 (18 Aug 2024 20:34) (17 - 18)  SpO2: 97% (18 Aug 2024 20:34) (96% - 98%)  Parameters below as of 18 Aug 2024 20:34  Patient On (Oxygen Delivery Method): room air    I&O's Summary    17 Aug 2024 07:01  -  18 Aug 2024 07:00  --------------------------------------------------------  IN: 480 mL / OUT: 975 mL / NET: -495 mL    18 Aug 2024 07:01  -  19 Aug 2024 00:02  --------------------------------------------------------  IN: 0 mL / OUT: 1110 mL / NET: -1110 mL    PHYSICAL EXAM:  Constitutional: NAD, resting comfortably in bed.   HEENT: Pupils equal, round, reactive to light. EOMI. Face symmetric, tongue midline.  Respiratory: No respiratory distress, lungs clear to auscultation bilaterally.   Cardiovascular: RRR, S1, S2.   Gastrointestinal: Abdomen soft, non tender, nondistended.  Neurological:  AAOX3. Opens eyes. Follows commands. Speech clear.  Cranial Nerves: II-XII intact  Motor: 5/5 power in b/l UE and LE  Sensation: intact to light touch in all extremities.   Pronator Drift: none  Dysmetria: none  Extremities: Warm, well perfused.  Wounds: thoracolumbar incision covered with aquacel    TUBES/LINES:  [] Rothman  [] Lumbar Drain  [x] Wound Drains  [] Others    DIET:  [] NPO  [x] Mechanical  [] Tube feeds    LABS:  CAPILLARY BLOOD GLUCOSE  POCT Blood Glucose.: 159 mg/dL (18 Aug 2024 12:17)    Drug Levels: [] N/A  CSF Analysis: [] N/A    Allergies  latex (Blisters)  No Known Drug Allergies  Intolerances    MEDICATIONS:  Antibiotics:    Neuro:  acetaminophen     Tablet .. 650 milliGRAM(s) Oral every 6 hours PRN  ALPRAZolam 0.5 milliGRAM(s) Oral every 12 hours PRN  methocarbamol 500 milliGRAM(s) Oral every 8 hours  traMADol 50 milliGRAM(s) Oral once PRN  zolpidem 5 milliGRAM(s) Oral at bedtime PRN    Anticoagulation:  heparin   Injectable 5000 Unit(s) SubCutaneous every 8 hours    OTHER:  allopurinol 100 milliGRAM(s) Oral daily  atorvastatin 80 milliGRAM(s) Oral at bedtime  bacitracin   Ointment 1 Application(s) Topical two times a day  benzocaine/menthol Lozenge 1 Lozenge Oral four times a day PRN  bisacodyl 5 milliGRAM(s) Oral every 12 hours  budesonide 160 MICROgram(s)/formoterol 4.5 MICROgram(s) Inhaler 2 Puff(s) Inhalation once  finasteride 5 milliGRAM(s) Oral daily  insulin lispro (ADMELOG) corrective regimen sliding scale   SubCutaneous every 6 hours  lactulose Syrup 10 Gram(s) Oral daily  lidocaine   4% Patch 1 Patch Transdermal every 24 hours  lidocaine   4% Patch 1 Patch Transdermal every 24 hours  naloxone Injectable 0.4 milliGRAM(s) IV Push once PRN  polyethylene glycol 3350 17 Gram(s) Oral two times a day  senna 2 Tablet(s) Oral two times a day  tamsulosin 0.8 milliGRAM(s) Oral at bedtime    IVF:  multivitamin 1 Tablet(s) Oral daily    CULTURES:    RADIOLOGY & ADDITIONAL TESTS:      ASSESSMENT:  83 y/o male with hx HTN, HLD, BPH,  T2DM (Hgba1c 6.7), CAD s/p stent about 4 yrs ago (on Plavix), +antiphospholipid antibody w history of B/L LE DVT was on Xarelto (4/2023 dx), CKD, Nephrolithiasis, Gout, Interstitial Lung Disease, L1-S1 fusion in 2020 @[facility]. Was worked up for severe, worsening low back pain, worse with ambulation. 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 Now s/p revision T10-L2 fusion, laminectomy T2/L1 (8/12).    Neuro:  - Neuro/vitals q4hr  - Pain control: tylenol prn, robaxin 500q8, lidocaine patches, holding gabapentin for oversedation (holding home buprenorphine)  - pain mgmt following  - HMV x1 dc 8/16, EZEKIEL x1, per plastics  - Insomnia/anxiety: Mirtazapine dc'd, ambien 5mg prn, xanax 0.5mg PO prn  - postop standing XRs complete    Cardio:  - SBP <160  - EKG 8/12: 1st degree heart block  - Hx cardiac stents: holding home ASA, plavix (resume POD7)  - HLD: atorvastatin 80mg qd    Pulm:  - RA  - ILD: continue home Symbicort     GI:  - Regular  - Bowel regimen, LBM 8/18    Renal:  - IVL  - urethral stricture, sc prn for retention (pt refusing intermittently)  - urology consulted, rec rothman if continued sc needs  - CKD: trend Cr   - BPH: home finaseteride, flomax  - Gout: allopurinol 100mg daily    Heme:  - DVT ppx: SCDs, SQH  - hx b/l LE DVTs: LE dopplers 8/14: no DVT     ID:  - afebrile    Dispo: Stepdown, full code, pending AR    D/w Dr Hernandez

## 2024-08-19 NOTE — PROGRESS NOTE ADULT - SUBJECTIVE AND OBJECTIVE BOX
Patient was seen and examined at bedside.   Family at bedside   Case discuss with the primary team.     OBJECTIVE:  Vital Signs Last 24 Hrs  T(C): 36.6 (19 Aug 2024 09:00), Max: 37.2 (19 Aug 2024 05:01)  T(F): 97.9 (19 Aug 2024 09:00), Max: 98.9 (19 Aug 2024 05:01)  HR: 62 (19 Aug 2024 08:27) (62 - 82)  BP: 126/59 (19 Aug 2024 08:27) (126/59 - 158/71)  BP(mean): 85 (19 Aug 2024 08:27) (85 - 102)  RR: 17 (19 Aug 2024 08:27) (17 - 18)  SpO2: 97% (19 Aug 2024 08:27) (97% - 98%)    Parameters below as of 19 Aug 2024 08:27  Patient On (Oxygen Delivery Method): room air        PHYSICAL EXAM:  Constitutional: NAD, elderly male laying in bed; family at bedside  HEENT: EOMI, MMM  Neck: Supple  Respiratory: CTA B/L, no w/r/r  Cardiovascular: RRR, normal S1 and S2  Gastrointestinal: mild abdominal  tenderness no rebound or guarding   Extremities: . + minimal b/l LE pitting edema.  Neurological: AAOx3, LE strength limited by pain   + EZEKIEL drain in place       MEDICATIONS  (STANDING):  allopurinol 100 milliGRAM(s) Oral daily  atorvastatin 80 milliGRAM(s) Oral at bedtime  bacitracin   Ointment 1 Application(s) Topical two times a day  bisacodyl 5 milliGRAM(s) Oral every 12 hours  budesonide 160 MICROgram(s)/formoterol 4.5 MICROgram(s) Inhaler 2 Puff(s) Inhalation once  clopidogrel Tablet 75 milliGRAM(s) Oral daily  finasteride 5 milliGRAM(s) Oral daily  heparin   Injectable 5000 Unit(s) SubCutaneous every 8 hours  insulin lispro (ADMELOG) corrective regimen sliding scale   SubCutaneous every 6 hours  lactulose Syrup 10 Gram(s) Oral daily  lidocaine   4% Patch 1 Patch Transdermal every 24 hours  lidocaine   4% Patch 1 Patch Transdermal every 24 hours  methocarbamol 500 milliGRAM(s) Oral every 8 hours  multivitamin 1 Tablet(s) Oral daily  polyethylene glycol 3350 17 Gram(s) Oral two times a day  senna 2 Tablet(s) Oral two times a day  tamsulosin 0.8 milliGRAM(s) Oral at bedtime    MEDICATIONS  (PRN):  acetaminophen     Tablet .. 650 milliGRAM(s) Oral every 6 hours PRN Mild Pain (1 - 3)  ALPRAZolam 0.5 milliGRAM(s) Oral every 12 hours PRN Anxiety  benzocaine/menthol Lozenge 1 Lozenge Oral four times a day PRN Sore Throat  naloxone Injectable 0.4 milliGRAM(s) IV Push once PRN Signs of opioid overdose  zolpidem 5 milliGRAM(s) Oral at bedtime PRN Insomnia

## 2024-08-19 NOTE — PROGRESS NOTE ADULT - ASSESSMENT
85 y/o male with hx HTN, HLD, BPH, T2DM (Hgba1c 6.7), CAD s/p remote stent to LAD (on Plavix), DVT b/l LEs in 4/23 with + Cardiolipin, CKD, Nephrolithiasis, Gout, Interstitial Lung Disease, L1-S1 fusion in 2020 @[facility] who was worked up for severe, worsening low back pain, worse with ambulation. 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 Now s/p revision T10-L2 fusion, laminectomy T2/L1 (8/12)    #Severe lumbar degenerative disease  #Post op state  s/p revision T10-L2 fusion, laminectomy T2/L1 (8/12)  #Constipation   - Pain control per primary team and pain management  - Continue Miralax, Senna and Lactulose; Last bowel movement 8/19   - Continue drain management as per NSGY team   - Encourage incentive spirometer, OOB as tolerated    #CAD with remote PCI  #HLD  -Pt to resume Plavix when safe from neurosurgery standpoint, DC aspirin once Plavix is restarted   - Continue atorvastatin     #DM  - well controlled, A1c < 7  - Continue to monitor FS ( WNL) and ISS    #Hx of B/l DVT  #Cardiolipin +  - DVTs in 4/23  - Pt has been on Eliquis 5mg BID in the past   - Charts reviewed, anticardiolipin antibody + in April 2023 after dx with DVTs, was on DOAC at the time of testing. Possibly false positive.   -  Has been recommended for heme referral since then but has not established or had repeat testing, DOAC held currently but possibly should be on Warfarin.  Would repeat testing now that he is off DOAC but patient refusing all blood work at this time.  - Will check anticardiolipin antibody if pt allows   - Discussed risk of not confirming Anticardiolipin status with Patient and family member at bedside , patients wants to follow up with PCP     # Depression  # Anxiety   - Pt was seen by behavioral health during this admission   -Continue Alprazolam prn     # HTN  - The pt was previously was on Toprol 25mg, Enalapril 5mg, Amlodipine 5mg daily  and Lasix 80mg BID but having episodes of hypotension  - Consider Resuming Toprol 25mg once daily , and if BP remains > 130/80 can consider restarting Enalapril , followed by lasix 40mg po bid       # BPH with urinary retention  # Urethral stricture   - c/w Finasteride and Flomax  - Pt continues to have urinary retention with > 500cc post void residual; Will continue straight cath interventions if pt allows  -Urology consulted for uretheral stricture and per urology no intervention needed at this time    #ILD  - Pt to follow up with Pulmonary   - c/w Symbicort    #Gout  - c/w home allopurinol    #CKD  - Pt's creatinine remains stable  around 1.8  - Pt  follows outpt with Dr Araujo  - Will continue to monitor creatinine if pt allows     #DVT ppx  -Continue Heparin  SQ q8     #DISPO  -Pt was seen by PT and recommended for Acute rehab     40 minutes spent on total encounter. The necessity of the time spent during the encounter on this date of service was due to:    Review of hospital course, labs, vitals, radiology and medical records.  Direct patient encounter including bedside exam   Discussed plan of care with primary team   Documenting the encounter.

## 2024-08-19 NOTE — PROGRESS NOTE ADULT - SUBJECTIVE AND OBJECTIVE BOX
PAIN MANAGEMENT CONSULT NOTE    Interval Events:  -       Since yesterday 6am, pt has required:  -       HOME MEDICATIONS:   Please refer to initial HNP    Allergies    latex (Blisters)  No Known Drug Allergies    Intolerances        PAIN MEDICATIONS:  acetaminophen     Tablet .. 650 milliGRAM(s) Oral every 6 hours PRN  ALPRAZolam 0.5 milliGRAM(s) Oral every 12 hours PRN  methocarbamol 500 milliGRAM(s) Oral every 8 hours  zolpidem 5 milliGRAM(s) Oral at bedtime PRN    Heme:  aspirin  chewable 81 milliGRAM(s) Oral daily  clopidogrel Tablet 75 milliGRAM(s) Oral daily  heparin   Injectable 5000 Unit(s) SubCutaneous every 8 hours    Antibiotics:    Cardiovascular:    GI:  bisacodyl 5 milliGRAM(s) Oral every 12 hours  lactulose Syrup 10 Gram(s) Oral daily  polyethylene glycol 3350 17 Gram(s) Oral two times a day  senna 2 Tablet(s) Oral two times a day    Endocrine:  allopurinol 100 milliGRAM(s) Oral daily  atorvastatin 80 milliGRAM(s) Oral at bedtime  finasteride 5 milliGRAM(s) Oral daily  insulin lispro (ADMELOG) corrective regimen sliding scale   SubCutaneous every 6 hours    All Other Medications:  bacitracin   Ointment 1 Application(s) Topical two times a day  benzocaine/menthol Lozenge 1 Lozenge Oral four times a day PRN  lidocaine   4% Patch 1 Patch Transdermal every 24 hours  lidocaine   4% Patch 1 Patch Transdermal every 24 hours  multivitamin 1 Tablet(s) Oral daily  naloxone Injectable 0.4 milliGRAM(s) IV Push once PRN  tamsulosin 0.8 milliGRAM(s) Oral at bedtime      Vital Signs Last 24 Hrs  T(C): 37.2 (19 Aug 2024 05:01), Max: 37.2 (19 Aug 2024 05:01)  T(F): 98.9 (19 Aug 2024 05:01), Max: 98.9 (19 Aug 2024 05:01)  HR: 70 (19 Aug 2024 06:32) (64 - 82)  BP: 158/71 (19 Aug 2024 06:32) (140/69 - 158/71)  BP(mean): 102 (19 Aug 2024 06:32) (94 - 102)  RR: 18 (19 Aug 2024 06:32) (17 - 18)  SpO2: 98% (19 Aug 2024 06:32) (97% - 98%)    Parameters below as of 19 Aug 2024 06:32  Patient On (Oxygen Delivery Method): room air        LABS:                RADIOLOGY:    Drug Screen:        REVIEW OF SYSTEMS:  CONSTITUTIONAL: Denies fever or fatigue   EYES: Denies eye pain, visual disturbances  HEENT: Denies difficulty hearing, throat/neck pain or stiffness  RESPIRATORY: Denies SOB, cough, wheezing  CARDIOVASCULAR: Denies chest pain, palpitations.   GASTROINTESTINAL: Endorses +flatus, BMs. Denies nausea, vomiting, abdominal or epigastric pain.   GENITOURINARY: Denies dysuria, frequency, or incontinence  NEUROLOGICAL: Endorses *** numbness, tingling. Denies headaches, loss of strength, tremors, dizziness or lightheadedness with pain medications.   MUSCULOSKELETAL: Denies joint pain or swelling      FUNCTIONAL ASSESSMENT:  PAIN SCORE AT REST:         SCALE USED: (1-10 VNRS)  PAIN SCORE WITH ACTIVITY:         SCALE USED: (1-10 VNRS)    PAIN ASSESSMENT:    FOCUSED PHYSICAL EXAM  GENERAL: Laying in bed, NAD  NEURO: CN II-XII grossly intact, EOMI  PULM: unlabored  CV: Regular rate and rhythm  ABDOMEN: Soft, Nontender, Nondistended  EXTREMITIES:  2+ Peripheral Pulses, No clubbing, cyanosis, or edema  SKIN: No rashes or lesions      ASSESSMENT:   85 y/o male with hx HTN, HLD, BPH,  T2DM (Hgba1c 6.7), CAD s/p stent about 4 yrs ago (on Plavix), +antiphospholipid antibody w history of B/L LE DVT was on Xarelto (4/2023 dx), CKD, Nephrolithiasis, Gout, Interstitial Lung Disease, L1-S1 fusion in 2020 @[facility]. Was worked up for severe, worsening low back pain, worse with ambulation. 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 Now s/p revision T10-L2 fusion, laminectomy T2/L1 (8/12).      PLAN:   - ***schedule tylenol 1gm q8h  - continue robaxin 500mg q8h  - can hold home suboxone 2mg BID  - ***  - monitor closely for oversedation, ensure narcan is ordered  - escalate bowel regimen as needed for bowel movement daily  ***recs not yet finalized***    - Bowel regimen: Senna, miralax   - Nausea ppx: Zofran as needed  - Functional Goals: Pt will get OOB with PT today. Pt will resume previous level of activity without impairment from surgery.   - Additional Consults: None recommended.   - Additional Labs/Imaging:  None recommended.     - Discharge Planning: per primary team  - Pain Management follow up plan: will continue to follow    Plan d/w Dr. Samuel.     Ruthie Harrison NP  Acute Pain Service PAIN MANAGEMENT CONSULT NOTE    Interval Events:  -       Since yesterday 6am, pt has required:  - tramadol 25mg x 1, 50mg x 1      HOME MEDICATIONS:   Please refer to initial HNP    Allergies    latex (Blisters)  No Known Drug Allergies    Intolerances        PAIN MEDICATIONS:  acetaminophen     Tablet .. 650 milliGRAM(s) Oral every 6 hours PRN  ALPRAZolam 0.5 milliGRAM(s) Oral every 12 hours PRN  methocarbamol 500 milliGRAM(s) Oral every 8 hours  zolpidem 5 milliGRAM(s) Oral at bedtime PRN    Heme:  aspirin  chewable 81 milliGRAM(s) Oral daily  clopidogrel Tablet 75 milliGRAM(s) Oral daily  heparin   Injectable 5000 Unit(s) SubCutaneous every 8 hours    Antibiotics:    Cardiovascular:    GI:  bisacodyl 5 milliGRAM(s) Oral every 12 hours  lactulose Syrup 10 Gram(s) Oral daily  polyethylene glycol 3350 17 Gram(s) Oral two times a day  senna 2 Tablet(s) Oral two times a day    Endocrine:  allopurinol 100 milliGRAM(s) Oral daily  atorvastatin 80 milliGRAM(s) Oral at bedtime  finasteride 5 milliGRAM(s) Oral daily  insulin lispro (ADMELOG) corrective regimen sliding scale   SubCutaneous every 6 hours    All Other Medications:  bacitracin   Ointment 1 Application(s) Topical two times a day  benzocaine/menthol Lozenge 1 Lozenge Oral four times a day PRN  lidocaine   4% Patch 1 Patch Transdermal every 24 hours  lidocaine   4% Patch 1 Patch Transdermal every 24 hours  multivitamin 1 Tablet(s) Oral daily  naloxone Injectable 0.4 milliGRAM(s) IV Push once PRN  tamsulosin 0.8 milliGRAM(s) Oral at bedtime      Vital Signs Last 24 Hrs  T(C): 37.2 (19 Aug 2024 05:01), Max: 37.2 (19 Aug 2024 05:01)  T(F): 98.9 (19 Aug 2024 05:01), Max: 98.9 (19 Aug 2024 05:01)  HR: 70 (19 Aug 2024 06:32) (64 - 82)  BP: 158/71 (19 Aug 2024 06:32) (140/69 - 158/71)  BP(mean): 102 (19 Aug 2024 06:32) (94 - 102)  RR: 18 (19 Aug 2024 06:32) (17 - 18)  SpO2: 98% (19 Aug 2024 06:32) (97% - 98%)    Parameters below as of 19 Aug 2024 06:32  Patient On (Oxygen Delivery Method): room air        LABS:                RADIOLOGY:    Drug Screen:        REVIEW OF SYSTEMS:  CONSTITUTIONAL: Denies fever or fatigue   EYES: Denies eye pain, visual disturbances  HEENT: Denies difficulty hearing, throat/neck pain or stiffness  RESPIRATORY: Denies SOB, cough, wheezing  CARDIOVASCULAR: Denies chest pain, palpitations.   GASTROINTESTINAL: Endorses +flatus, BMs. Denies nausea, vomiting, abdominal or epigastric pain.   GENITOURINARY: Denies dysuria, frequency, or incontinence  NEUROLOGICAL: Endorses *** numbness, tingling. Denies headaches, loss of strength, tremors, dizziness or lightheadedness with pain medications.   MUSCULOSKELETAL: Denies joint pain or swelling      FUNCTIONAL ASSESSMENT:  PAIN SCORE AT REST:         SCALE USED: (1-10 VNRS)  PAIN SCORE WITH ACTIVITY:         SCALE USED: (1-10 VNRS)    PAIN ASSESSMENT:    FOCUSED PHYSICAL EXAM  GENERAL: Laying in bed, NAD  NEURO: CN II-XII grossly intact, EOMI  PULM: unlabored  CV: Regular rate and rhythm  ABDOMEN: Soft, Nontender, Nondistended  EXTREMITIES:  2+ Peripheral Pulses, No clubbing, cyanosis, or edema  SKIN: No rashes or lesions      ASSESSMENT:   83 y/o male with hx HTN, HLD, BPH,  T2DM (Hgba1c 6.7), CAD s/p stent about 4 yrs ago (on Plavix), +antiphospholipid antibody w history of B/L LE DVT was on Xarelto (4/2023 dx), CKD, Nephrolithiasis, Gout, Interstitial Lung Disease, L1-S1 fusion in 2020 @[facility]. Was worked up for severe, worsening low back pain, worse with ambulation. 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 Now s/p revision T10-L2 fusion, laminectomy T2/L1 (8/12).      PLAN:   - ***schedule tylenol 1gm q8h  - continue robaxin 500mg q8h  - can hold home suboxone 2mg BID  - ***  - monitor closely for oversedation, ensure narcan is ordered  - escalate bowel regimen as needed for bowel movement daily  ***recs not yet finalized***    - Bowel regimen: Senna, miralax   - Nausea ppx: Zofran as needed  - Functional Goals: Pt will get OOB with PT today. Pt will resume previous level of activity without impairment from surgery.   - Additional Consults: None recommended.   - Additional Labs/Imaging:  None recommended.     - Discharge Planning: per primary team  - Pain Management follow up plan: will continue to follow    Plan d/w Dr. Samuel.     Ruthie Harrison NP  Acute Pain Service PAIN MANAGEMENT CONSULT NOTE    Interval Events:  - more awake this AM      Since yesterday 6am, pt has required:  - tramadol 25mg x 1, 50mg x 1      HOME MEDICATIONS:   Please refer to initial HNP    Allergies    latex (Blisters)  No Known Drug Allergies    Intolerances        PAIN MEDICATIONS:  acetaminophen     Tablet .. 650 milliGRAM(s) Oral every 6 hours PRN  ALPRAZolam 0.5 milliGRAM(s) Oral every 12 hours PRN  methocarbamol 500 milliGRAM(s) Oral every 8 hours  zolpidem 5 milliGRAM(s) Oral at bedtime PRN    Heme:  aspirin  chewable 81 milliGRAM(s) Oral daily  clopidogrel Tablet 75 milliGRAM(s) Oral daily  heparin   Injectable 5000 Unit(s) SubCutaneous every 8 hours    Antibiotics:    Cardiovascular:    GI:  bisacodyl 5 milliGRAM(s) Oral every 12 hours  lactulose Syrup 10 Gram(s) Oral daily  polyethylene glycol 3350 17 Gram(s) Oral two times a day  senna 2 Tablet(s) Oral two times a day    Endocrine:  allopurinol 100 milliGRAM(s) Oral daily  atorvastatin 80 milliGRAM(s) Oral at bedtime  finasteride 5 milliGRAM(s) Oral daily  insulin lispro (ADMELOG) corrective regimen sliding scale   SubCutaneous every 6 hours    All Other Medications:  bacitracin   Ointment 1 Application(s) Topical two times a day  benzocaine/menthol Lozenge 1 Lozenge Oral four times a day PRN  lidocaine   4% Patch 1 Patch Transdermal every 24 hours  lidocaine   4% Patch 1 Patch Transdermal every 24 hours  multivitamin 1 Tablet(s) Oral daily  naloxone Injectable 0.4 milliGRAM(s) IV Push once PRN  tamsulosin 0.8 milliGRAM(s) Oral at bedtime      Vital Signs Last 24 Hrs  T(C): 37.2 (19 Aug 2024 05:01), Max: 37.2 (19 Aug 2024 05:01)  T(F): 98.9 (19 Aug 2024 05:01), Max: 98.9 (19 Aug 2024 05:01)  HR: 70 (19 Aug 2024 06:32) (64 - 82)  BP: 158/71 (19 Aug 2024 06:32) (140/69 - 158/71)  BP(mean): 102 (19 Aug 2024 06:32) (94 - 102)  RR: 18 (19 Aug 2024 06:32) (17 - 18)  SpO2: 98% (19 Aug 2024 06:32) (97% - 98%)    Parameters below as of 19 Aug 2024 06:32  Patient On (Oxygen Delivery Method): room air        LABS:                RADIOLOGY:    Drug Screen:        REVIEW OF SYSTEMS:  CONSTITUTIONAL: Denies fever or fatigue   EYES: Denies eye pain, visual disturbances  HEENT: Denies difficulty hearing, throat/neck pain or stiffness  RESPIRATORY: Denies SOB, cough, wheezing  CARDIOVASCULAR: Denies chest pain, palpitations.   GASTROINTESTINAL: Endorses +flatus, BMs. Denies nausea, vomiting, abdominal or epigastric pain.   GENITOURINARY: Denies dysuria, frequency, or incontinence  NEUROLOGICAL: Endorses numbness, tingling to b/l hands and legs. Denies headaches, loss of strength, tremors, dizziness or lightheadedness with pain medications.   MUSCULOSKELETAL: Denies joint pain or swelling      FUNCTIONAL ASSESSMENT:  PAIN SCORE AT REST:         SCALE USED: (1-10 VNRS)  PAIN SCORE WITH ACTIVITY:         SCALE USED: (1-10 VNRS)    PAIN ASSESSMENT:  - 7/10 low back/incisional pain    FOCUSED PHYSICAL EXAM  GENERAL: Laying in bed, NAD  NEURO: CN II-XII grossly intact, EOMI  PULM: unlabored  CV: Regular rate and rhythm  ABDOMEN: Soft, Nontender, Nondistended  EXTREMITIES:  2+ Peripheral Pulses, No clubbing, cyanosis, or edema  SKIN: No rashes or lesions      ASSESSMENT:   85 y/o male with hx HTN, HLD, BPH,  T2DM (Hgba1c 6.7), CAD s/p stent about 4 yrs ago (on Plavix), +antiphospholipid antibody w history of B/L LE DVT was on Xarelto (4/2023 dx), CKD, Nephrolithiasis, Gout, Interstitial Lung Disease, L1-S1 fusion in 2020 @[facility]. Was worked up for severe, worsening low back pain, worse with ambulation. 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 Now s/p revision T10-L2 fusion, laminectomy T2/L1 (8/12).      PLAN:   - schedule tylenol 1gm q8h  - continue robaxin 500mg q8h  - can hold home suboxone 2mg BID  - would hold futher tramadol doses until kidney function is checked  - monitor closely for oversedation, ensure narcan is ordered  - escalate bowel regimen as needed for bowel movement daily  ***recs not finalized***    - Bowel regimen: Senna, miralax   - Nausea ppx: Zofran as needed  - Functional Goals: Pt will get OOB with PT today. Pt will resume previous level of activity without impairment from surgery.   - Additional Consults: None recommended.   - Additional Labs/Imaging:  None recommended.     - Discharge Planning: per primary team  - Pain Management follow up plan: will continue to follow    Plan d/w Dr. Samuel.     Ruthie Harrison NP  Acute Pain Service PAIN MANAGEMENT CONSULT NOTE    Interval Events:  - more awake this AM      Since yesterday 6am, pt has required:  - tramadol 25mg x 1, 50mg x 1      HOME MEDICATIONS:   Please refer to initial HNP    Allergies    latex (Blisters)  No Known Drug Allergies    Intolerances        PAIN MEDICATIONS:  acetaminophen     Tablet .. 650 milliGRAM(s) Oral every 6 hours PRN  ALPRAZolam 0.5 milliGRAM(s) Oral every 12 hours PRN  methocarbamol 500 milliGRAM(s) Oral every 8 hours  zolpidem 5 milliGRAM(s) Oral at bedtime PRN    Heme:  aspirin  chewable 81 milliGRAM(s) Oral daily  clopidogrel Tablet 75 milliGRAM(s) Oral daily  heparin   Injectable 5000 Unit(s) SubCutaneous every 8 hours    Antibiotics:    Cardiovascular:    GI:  bisacodyl 5 milliGRAM(s) Oral every 12 hours  lactulose Syrup 10 Gram(s) Oral daily  polyethylene glycol 3350 17 Gram(s) Oral two times a day  senna 2 Tablet(s) Oral two times a day    Endocrine:  allopurinol 100 milliGRAM(s) Oral daily  atorvastatin 80 milliGRAM(s) Oral at bedtime  finasteride 5 milliGRAM(s) Oral daily  insulin lispro (ADMELOG) corrective regimen sliding scale   SubCutaneous every 6 hours    All Other Medications:  bacitracin   Ointment 1 Application(s) Topical two times a day  benzocaine/menthol Lozenge 1 Lozenge Oral four times a day PRN  lidocaine   4% Patch 1 Patch Transdermal every 24 hours  lidocaine   4% Patch 1 Patch Transdermal every 24 hours  multivitamin 1 Tablet(s) Oral daily  naloxone Injectable 0.4 milliGRAM(s) IV Push once PRN  tamsulosin 0.8 milliGRAM(s) Oral at bedtime      Vital Signs Last 24 Hrs  T(C): 37.2 (19 Aug 2024 05:01), Max: 37.2 (19 Aug 2024 05:01)  T(F): 98.9 (19 Aug 2024 05:01), Max: 98.9 (19 Aug 2024 05:01)  HR: 70 (19 Aug 2024 06:32) (64 - 82)  BP: 158/71 (19 Aug 2024 06:32) (140/69 - 158/71)  BP(mean): 102 (19 Aug 2024 06:32) (94 - 102)  RR: 18 (19 Aug 2024 06:32) (17 - 18)  SpO2: 98% (19 Aug 2024 06:32) (97% - 98%)    Parameters below as of 19 Aug 2024 06:32  Patient On (Oxygen Delivery Method): room air        LABS:                RADIOLOGY:    Drug Screen:        REVIEW OF SYSTEMS:  CONSTITUTIONAL: Denies fever or fatigue   EYES: Denies eye pain, visual disturbances  HEENT: Denies difficulty hearing, throat/neck pain or stiffness  RESPIRATORY: Denies SOB, cough, wheezing  CARDIOVASCULAR: Denies chest pain, palpitations.   GASTROINTESTINAL: Endorses +flatus, BMs. Denies nausea, vomiting, abdominal or epigastric pain.   GENITOURINARY: Denies dysuria, frequency, or incontinence  NEUROLOGICAL: Endorses numbness, tingling to b/l hands and legs. Denies headaches, loss of strength, tremors, dizziness or lightheadedness with pain medications.   MUSCULOSKELETAL: Denies joint pain or swelling      FUNCTIONAL ASSESSMENT:  PAIN SCORE AT REST:         SCALE USED: (1-10 VNRS)  PAIN SCORE WITH ACTIVITY:         SCALE USED: (1-10 VNRS)    PAIN ASSESSMENT:  - 7/10 low back/incisional pain    FOCUSED PHYSICAL EXAM  GENERAL: Laying in bed, NAD  NEURO: CN II-XII grossly intact, EOMI  PULM: unlabored  CV: Regular rate and rhythm  ABDOMEN: Soft, Nontender, Nondistended  EXTREMITIES:  2+ Peripheral Pulses, No clubbing, cyanosis, or edema  SKIN: No rashes or lesions      ASSESSMENT:   83 y/o male with hx HTN, HLD, BPH,  T2DM (Hgba1c 6.7), CAD s/p stent about 4 yrs ago (on Plavix), +antiphospholipid antibody w history of B/L LE DVT was on Xarelto (4/2023 dx), CKD, Nephrolithiasis, Gout, Interstitial Lung Disease, L1-S1 fusion in 2020 @[facility]. Was worked up for severe, worsening low back pain, worse with ambulation. 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 Now s/p revision T10-L2 fusion, laminectomy T2/L1 (8/12).      PLAN:   - schedule tylenol 1gm q8h  - continue robaxin 500mg q8h  - can hold home suboxone 2mg BID  - would hold futher tramadol doses until kidney function is checked  - monitor closely for oversedation, ensure narcan is ordered  - escalate bowel regimen as needed for bowel movement daily      - Bowel regimen: Senna, miralax   - Nausea ppx: Zofran as needed  - Functional Goals: Pt will get OOB with PT today. Pt will resume previous level of activity without impairment from surgery.   - Additional Consults: None recommended.   - Additional Labs/Imaging:  None recommended.     - Discharge Planning: per primary team  - Pain Management follow up plan: will continue to follow    Plan d/w Dr. Samuel.     Ruthie Harrison NP  Acute Pain Service

## 2024-08-20 ENCOUNTER — TRANSCRIPTION ENCOUNTER (OUTPATIENT)
Age: 84
End: 2024-08-20

## 2024-08-20 VITALS
DIASTOLIC BLOOD PRESSURE: 70 MMHG | RESPIRATION RATE: 16 BRPM | SYSTOLIC BLOOD PRESSURE: 167 MMHG | OXYGEN SATURATION: 98 %

## 2024-08-20 LAB
ANION GAP SERPL CALC-SCNC: 7 MMOL/L — SIGNIFICANT CHANGE UP (ref 5–17)
BUN SERPL-MCNC: 33 MG/DL — HIGH (ref 7–23)
CALCIUM SERPL-MCNC: 9.1 MG/DL — SIGNIFICANT CHANGE UP (ref 8.4–10.5)
CHLORIDE SERPL-SCNC: 108 MMOL/L — SIGNIFICANT CHANGE UP (ref 96–108)
CO2 SERPL-SCNC: 21 MMOL/L — LOW (ref 22–31)
CREAT SERPL-MCNC: 1.63 MG/DL — HIGH (ref 0.5–1.3)
EGFR: 41 ML/MIN/1.73M2 — LOW
GLUCOSE SERPL-MCNC: 138 MG/DL — HIGH (ref 70–99)
HCT VFR BLD CALC: 26 % — LOW (ref 39–50)
HGB BLD-MCNC: 8 G/DL — LOW (ref 13–17)
MAGNESIUM SERPL-MCNC: 2.1 MG/DL — SIGNIFICANT CHANGE UP (ref 1.6–2.6)
MCHC RBC-ENTMCNC: 26.8 PG — LOW (ref 27–34)
MCHC RBC-ENTMCNC: 30.8 GM/DL — LOW (ref 32–36)
MCV RBC AUTO: 87 FL — SIGNIFICANT CHANGE UP (ref 80–100)
NRBC # BLD: 0 /100 WBCS — SIGNIFICANT CHANGE UP (ref 0–0)
PHOSPHATE SERPL-MCNC: 2.4 MG/DL — LOW (ref 2.5–4.5)
PLATELET # BLD AUTO: 189 K/UL — SIGNIFICANT CHANGE UP (ref 150–400)
POTASSIUM SERPL-MCNC: 4.7 MMOL/L — SIGNIFICANT CHANGE UP (ref 3.5–5.3)
POTASSIUM SERPL-SCNC: 4.7 MMOL/L — SIGNIFICANT CHANGE UP (ref 3.5–5.3)
RBC # BLD: 2.99 M/UL — LOW (ref 4.2–5.8)
RBC # FLD: 16.2 % — HIGH (ref 10.3–14.5)
SODIUM SERPL-SCNC: 136 MMOL/L — SIGNIFICANT CHANGE UP (ref 135–145)
WBC # BLD: 6.04 K/UL — SIGNIFICANT CHANGE UP (ref 3.8–10.5)
WBC # FLD AUTO: 6.04 K/UL — SIGNIFICANT CHANGE UP (ref 3.8–10.5)

## 2024-08-20 PROCEDURE — 93970 EXTREMITY STUDY: CPT

## 2024-08-20 PROCEDURE — 85730 THROMBOPLASTIN TIME PARTIAL: CPT

## 2024-08-20 PROCEDURE — 97530 THERAPEUTIC ACTIVITIES: CPT

## 2024-08-20 PROCEDURE — 97165 OT EVAL LOW COMPLEX 30 MIN: CPT

## 2024-08-20 PROCEDURE — 85025 COMPLETE CBC W/AUTO DIFF WBC: CPT

## 2024-08-20 PROCEDURE — 97116 GAIT TRAINING THERAPY: CPT

## 2024-08-20 PROCEDURE — 80048 BASIC METABOLIC PNL TOTAL CA: CPT

## 2024-08-20 PROCEDURE — 84484 ASSAY OF TROPONIN QUANT: CPT

## 2024-08-20 PROCEDURE — 84100 ASSAY OF PHOSPHORUS: CPT

## 2024-08-20 PROCEDURE — C1713: CPT

## 2024-08-20 PROCEDURE — 85027 COMPLETE CBC AUTOMATED: CPT

## 2024-08-20 PROCEDURE — 97161 PT EVAL LOW COMPLEX 20 MIN: CPT

## 2024-08-20 PROCEDURE — 71045 X-RAY EXAM CHEST 1 VIEW: CPT

## 2024-08-20 PROCEDURE — 97110 THERAPEUTIC EXERCISES: CPT

## 2024-08-20 PROCEDURE — 86901 BLOOD TYPING SEROLOGIC RH(D): CPT

## 2024-08-20 PROCEDURE — 84443 ASSAY THYROID STIM HORMONE: CPT

## 2024-08-20 PROCEDURE — 97535 SELF CARE MNGMENT TRAINING: CPT

## 2024-08-20 PROCEDURE — 85610 PROTHROMBIN TIME: CPT

## 2024-08-20 PROCEDURE — 99232 SBSQ HOSP IP/OBS MODERATE 35: CPT

## 2024-08-20 PROCEDURE — 86900 BLOOD TYPING SEROLOGIC ABO: CPT

## 2024-08-20 PROCEDURE — 73610 X-RAY EXAM OF ANKLE: CPT

## 2024-08-20 PROCEDURE — 86850 RBC ANTIBODY SCREEN: CPT

## 2024-08-20 PROCEDURE — 83735 ASSAY OF MAGNESIUM: CPT

## 2024-08-20 PROCEDURE — 82962 GLUCOSE BLOOD TEST: CPT

## 2024-08-20 PROCEDURE — 76000 FLUOROSCOPY <1 HR PHYS/QHP: CPT

## 2024-08-20 PROCEDURE — C1889: CPT

## 2024-08-20 PROCEDURE — 72082 X-RAY EXAM ENTIRE SPI 2/3 VW: CPT

## 2024-08-20 PROCEDURE — 36415 COLL VENOUS BLD VENIPUNCTURE: CPT

## 2024-08-20 PROCEDURE — 83036 HEMOGLOBIN GLYCOSYLATED A1C: CPT

## 2024-08-20 RX ORDER — LIDOCAINE/BENZALKONIUM/ALCOHOL
1 SOLUTION, NON-ORAL TOPICAL
Qty: 0 | Refills: 0 | DISCHARGE
Start: 2024-08-20

## 2024-08-20 RX ORDER — TRAMADOL HYDROCHLORIDE 200 MG/1
25 TABLET, EXTENDED RELEASE ORAL EVERY 4 HOURS
Refills: 0 | Status: DISCONTINUED | OUTPATIENT
Start: 2024-08-20 | End: 2024-08-20

## 2024-08-20 RX ORDER — METHOCARBAMOL 750 MG/1
1 TABLET, FILM COATED ORAL
Qty: 0 | Refills: 0 | DISCHARGE
Start: 2024-08-20

## 2024-08-20 RX ORDER — TRAMADOL HYDROCHLORIDE 200 MG/1
1 TABLET, EXTENDED RELEASE ORAL
Qty: 0 | Refills: 0 | DISCHARGE
Start: 2024-08-20

## 2024-08-20 RX ORDER — TRAMADOL HYDROCHLORIDE 200 MG/1
50 TABLET, EXTENDED RELEASE ORAL EVERY 4 HOURS
Refills: 0 | Status: DISCONTINUED | OUTPATIENT
Start: 2024-08-20 | End: 2024-08-20

## 2024-08-20 RX ORDER — TRAMADOL HYDROCHLORIDE 200 MG/1
50 TABLET, EXTENDED RELEASE ORAL EVERY 6 HOURS
Refills: 0 | Status: DISCONTINUED | OUTPATIENT
Start: 2024-08-20 | End: 2024-08-20

## 2024-08-20 RX ORDER — ACETAMINOPHEN 325 MG/1
2 TABLET ORAL
Qty: 0 | Refills: 0 | DISCHARGE
Start: 2024-08-20

## 2024-08-20 RX ORDER — LACTULOSE 10 G
15 PACKET (EA) ORAL
Qty: 0 | Refills: 0 | DISCHARGE
Start: 2024-08-20

## 2024-08-20 RX ORDER — HEPARIN SODIUM,BOVINE 1000/ML
5000 VIAL (ML) INJECTION
Qty: 0 | Refills: 0 | DISCHARGE
Start: 2024-08-20

## 2024-08-20 RX ORDER — SODIUM PHOSPHATE, DIBASIC, ANHYDROUS, POTASSIUM PHOSPHATE, MONOBASIC, AND SODIUM PHOSPHATE, MONOBASIC, MONOHYDRATE 852; 155; 130 MG/1; MG/1; MG/1
1 TABLET, COATED ORAL ONCE
Refills: 0 | Status: COMPLETED | OUTPATIENT
Start: 2024-08-20 | End: 2024-08-20

## 2024-08-20 RX ORDER — TRAMADOL HYDROCHLORIDE 200 MG/1
0.5 TABLET, EXTENDED RELEASE ORAL
Qty: 0 | Refills: 0 | DISCHARGE
Start: 2024-08-20

## 2024-08-20 RX ORDER — POLYETHYLENE GLYCOL 3350 17 G/17G
17 POWDER, FOR SOLUTION ORAL
Qty: 0 | Refills: 0 | DISCHARGE
Start: 2024-08-20

## 2024-08-20 RX ORDER — BUDESONIDE AND FORMOTEROL FUMARATE 80; 4.5 UG/1; UG/1
2 AEROSOL, METERED RESPIRATORY (INHALATION)
Qty: 0 | Refills: 0 | DISCHARGE
Start: 2024-08-20

## 2024-08-20 RX ORDER — TRAMADOL HYDROCHLORIDE 200 MG/1
25 TABLET, EXTENDED RELEASE ORAL EVERY 6 HOURS
Refills: 0 | Status: DISCONTINUED | OUTPATIENT
Start: 2024-08-20 | End: 2024-08-20

## 2024-08-20 RX ORDER — METOPROLOL TARTRATE 100 MG/1
1 TABLET ORAL
Qty: 0 | Refills: 0 | DISCHARGE
Start: 2024-08-20

## 2024-08-20 RX ORDER — ALPRAZOLAM 0.25 MG
1 TABLET ORAL
Qty: 0 | Refills: 0 | DISCHARGE
Start: 2024-08-20

## 2024-08-20 RX ADMIN — TRAMADOL HYDROCHLORIDE 25 MILLIGRAM(S): 200 TABLET, EXTENDED RELEASE ORAL at 14:06

## 2024-08-20 RX ADMIN — Medication 1 APPLICATION(S): at 06:17

## 2024-08-20 RX ADMIN — METOPROLOL TARTRATE 25 MILLIGRAM(S): 100 TABLET ORAL at 06:18

## 2024-08-20 RX ADMIN — SODIUM PHOSPHATE, DIBASIC, ANHYDROUS, POTASSIUM PHOSPHATE, MONOBASIC, AND SODIUM PHOSPHATE, MONOBASIC, MONOHYDRATE 1 PACKET(S): 852; 155; 130 TABLET, COATED ORAL at 10:18

## 2024-08-20 RX ADMIN — ACETAMINOPHEN 1000 MILLIGRAM(S): 325 TABLET ORAL at 14:05

## 2024-08-20 RX ADMIN — Medication 5000 UNIT(S): at 13:24

## 2024-08-20 RX ADMIN — TRAMADOL HYDROCHLORIDE 50 MILLIGRAM(S): 200 TABLET, EXTENDED RELEASE ORAL at 17:30

## 2024-08-20 RX ADMIN — TRAMADOL HYDROCHLORIDE 25 MILLIGRAM(S): 200 TABLET, EXTENDED RELEASE ORAL at 21:19

## 2024-08-20 RX ADMIN — TRAMADOL HYDROCHLORIDE 25 MILLIGRAM(S): 200 TABLET, EXTENDED RELEASE ORAL at 00:32

## 2024-08-20 RX ADMIN — TRAMADOL HYDROCHLORIDE 50 MILLIGRAM(S): 200 TABLET, EXTENDED RELEASE ORAL at 10:17

## 2024-08-20 RX ADMIN — METHOCARBAMOL 500 MILLIGRAM(S): 750 TABLET, FILM COATED ORAL at 06:17

## 2024-08-20 RX ADMIN — Medication 1 PATCH: at 06:48

## 2024-08-20 RX ADMIN — TRAMADOL HYDROCHLORIDE 50 MILLIGRAM(S): 200 TABLET, EXTENDED RELEASE ORAL at 16:41

## 2024-08-20 RX ADMIN — TRAMADOL HYDROCHLORIDE 25 MILLIGRAM(S): 200 TABLET, EXTENDED RELEASE ORAL at 01:21

## 2024-08-20 RX ADMIN — ACETAMINOPHEN 1000 MILLIGRAM(S): 325 TABLET ORAL at 13:23

## 2024-08-20 RX ADMIN — Medication 1 TABLET(S): at 11:27

## 2024-08-20 RX ADMIN — Medication 75 MILLIGRAM(S): at 11:28

## 2024-08-20 RX ADMIN — Medication 5000 UNIT(S): at 06:18

## 2024-08-20 RX ADMIN — TRAMADOL HYDROCHLORIDE 25 MILLIGRAM(S): 200 TABLET, EXTENDED RELEASE ORAL at 22:19

## 2024-08-20 RX ADMIN — TRAMADOL HYDROCHLORIDE 25 MILLIGRAM(S): 200 TABLET, EXTENDED RELEASE ORAL at 13:24

## 2024-08-20 RX ADMIN — ACETAMINOPHEN 1000 MILLIGRAM(S): 325 TABLET ORAL at 06:18

## 2024-08-20 RX ADMIN — TRAMADOL HYDROCHLORIDE 50 MILLIGRAM(S): 200 TABLET, EXTENDED RELEASE ORAL at 11:05

## 2024-08-20 RX ADMIN — Medication 100 MILLIGRAM(S): at 11:27

## 2024-08-20 RX ADMIN — ACETAMINOPHEN 1000 MILLIGRAM(S): 325 TABLET ORAL at 07:30

## 2024-08-20 RX ADMIN — FINASTERIDE 5 MILLIGRAM(S): 1 TABLET, FILM COATED ORAL at 11:28

## 2024-08-20 RX ADMIN — METHOCARBAMOL 500 MILLIGRAM(S): 750 TABLET, FILM COATED ORAL at 13:24

## 2024-08-20 NOTE — CHART NOTE - NSCHARTNOTEFT_GEN_A_CORE
Admitting Diagnosis:   Patient is a 84y old  Male who presents with a chief complaint of T10-L2 revision of fusion (20 Aug 2024 09:43)    PAST MEDICAL & SURGICAL HISTORY:  Diabetes  BPH (benign prostatic hypertrophy)  Hyperlipidemia  Gout  HTN (hypertension)  CAD (coronary artery disease)  CARDIAC STENT X1 2019  Chronic kidney disease (CKD)  Back pain  Deep vein thrombosis (DVT) BILAT   H/O kyphosis  THORACOLUMBAR REGION  Venous insufficiency  H/O: depression  H/O edema  Chronic pain syndrome  PAF (paroxysmal atrial fibrillation)  H/O heart artery stent 2019 x1  H/O shoulder surgery RIGHT  History of back surgery  H/O neck surgery  H/O total knee replacement BILAT    Current Nutrition Order:  Consistent Carbohydrate w/Evening Snack     PO Intake: Good (%) [ x ]  Fair (50-75%) [   ] Poor (<25%) [   ]    GI Issues:   Pt denies nausea/vomiting  Endorses bowel irregularity throughout admission, per RN x2 loose BMs yesterday  - bowel regimen ordered    Pain:  Pt reports abdominal pain, no distension noted - RD notified RN    Skin Integrity:  No pressure injuries or edema documented  Surgical incisions and left arm skin tear noted  Maurizio score 20    24 @ 07:01  -  24 @ 07:00  --------------------------------------------------------  IN: 0 mL / OUT: 775 mL / NET: -775 mL    Labs:       136  |  108  |  33<H>  ----------------------------<  138<H>  4.7   |  21<L>  |  1.63<H>    Ca    9.1      20 Aug 2024 06:35  Phos  2.4     08-20  Mg     2.1     08-20    Medications:  MEDICATIONS  (STANDING):  acetaminophen     Tablet .. 1000 milliGRAM(s) Oral every 8 hours  allopurinol 100 milliGRAM(s) Oral daily  atorvastatin 80 milliGRAM(s) Oral at bedtime  bacitracin   Ointment 1 Application(s) Topical two times a day  bisacodyl 5 milliGRAM(s) Oral every 12 hours  budesonide 160 MICROgram(s)/formoterol 4.5 MICROgram(s) Inhaler 2 Puff(s) Inhalation once  clopidogrel Tablet 75 milliGRAM(s) Oral daily  finasteride 5 milliGRAM(s) Oral daily  heparin   Injectable 5000 Unit(s) SubCutaneous every 8 hours  insulin lispro (ADMELOG) corrective regimen sliding scale   SubCutaneous every 6 hours  lactulose Syrup 10 Gram(s) Oral daily  lidocaine   4% Patch 1 Patch Transdermal every 24 hours  lidocaine   4% Patch 1 Patch Transdermal every 24 hours  methocarbamol 500 milliGRAM(s) Oral every 8 hours  metoprolol succinate ER 25 milliGRAM(s) Oral daily  multivitamin 1 Tablet(s) Oral daily  polyethylene glycol 3350 17 Gram(s) Oral two times a day  senna 2 Tablet(s) Oral two times a day  tamsulosin 0.8 milliGRAM(s) Oral at bedtime    MEDICATIONS  (PRN):  ALPRAZolam 0.5 milliGRAM(s) Oral every 12 hours PRN Anxiety  benzocaine/menthol Lozenge 1 Lozenge Oral four times a day PRN Sore Throat  naloxone Injectable 0.4 milliGRAM(s) IV Push once PRN Signs of opioid overdose  traMADol 50 milliGRAM(s) Oral every 6 hours PRN Severe Pain (7 - 10)  traMADol 25 milliGRAM(s) Oral every 6 hours PRN Moderate Pain (4 - 6)  zolpidem 5 milliGRAM(s) Oral at bedtime PRN Insomnia    Anthropometrics:  Height: 5'9"  Weight: 153lb/69.3kg  BMI: 22.5  IBW: 160lb/72.7kg              96%IBW    Weight Change:   No new weights obtained since admission. Recommend nursing to trend weekly weights. RD to continue monitoring weights as able.     Estimated energy needs:   Calories: 23-28kcal/k-1940kcal/d  Protein: 1.1-1.3g/k-90g/d  Fluids: 30-35mL/k-2425mL/d  Estimated needs based on dosing wt as within % IBW 160lb/72.7kg (96%). Needs adjusted for age and post-op healing.     Subjective:   84M with PMH of HTN, HLD, BPH, DM2, CAD (status post stent x4 yrs ago), +antiphospholipid antibody, bilateral LE DVT, CKD, nephrolithiasis, gout, and interstitial lung disease, L1-S1 fusion () with prior work-up for low back pain with imagining showing severe degenerative disease who admitted for extension of fusion to T10. Now status post revision T10-L2 fusion, laminectomy T2/L1 ().     Pt seen on 8LA for follow-up. Labs and medication orders reviewed. Ordered for multivitamin. On Consistent Carbohydrate diet. Pt reports completing >75% meals despite disliking the food in-house, endorses frustration with repetitive meals in setting of diet order restrictions. Endorses irregular BMs during admission, constipation now relived with x2 loose BMs yesterday per RN. Pt endorses abdominal pain - RD notified RN, RN suspects related to urinary retention and plans to bladder scan. Pt denies difficulty chewing/swallowing. See nutrition recommendations. RD to remain available.     Previous Nutrition Diagnosis:  Increased nutrient needs related to increased metabolic demand as evidenced by status post revision T10-L2 fusion, laminectomy T2/L1.    Active [ x ]  Resolved [   ]    Goal:  Pt to consistently meet >75% nutrient needs.     Recommendations:  1. Recommend Regular diet to promote menu variety, POC blood glucose WNL.   >>Encourage & monitor PO intake. [place] dietary preferences as able.   2. Continue micronutrient supplementation.   3. Monitor GI tolerance, weight trends, labs, & skin integrity.  4. Defer bowel and pain regimens to team.   5. RD to remain available for diet education/intervention prn.     Education:   Encouraged continued adequate intake of meals with emphasis on protein foods. Pt aware RD remains available for additional questions/concerns.     Risk Level: High [ x ] Moderate [   ] Low [   ] Admitting Diagnosis:   Patient is a 84y old  Male who presents with a chief complaint of T10-L2 revision of fusion (20 Aug 2024 09:43)    PAST MEDICAL & SURGICAL HISTORY:  Diabetes  BPH (benign prostatic hypertrophy)  Hyperlipidemia  Gout  HTN (hypertension)  CAD (coronary artery disease)  CARDIAC STENT X1 2019  Chronic kidney disease (CKD)  Back pain  Deep vein thrombosis (DVT) BILAT   H/O kyphosis  THORACOLUMBAR REGION  Venous insufficiency  H/O: depression  H/O edema  Chronic pain syndrome  PAF (paroxysmal atrial fibrillation)  H/O heart artery stent 2019 x1  H/O shoulder surgery RIGHT  History of back surgery  H/O neck surgery  H/O total knee replacement BILAT    Current Nutrition Order:  Consistent Carbohydrate w/Evening Snack     PO Intake: Good (%) [ x ]  Fair (50-75%) [   ] Poor (<25%) [   ]    GI Issues:   Pt denies nausea/vomiting  Endorses bowel irregularity throughout admission, per RN x2 loose BMs yesterday  - bowel regimen ordered    Pain:  Pt reports abdominal pain, no distension noted - RD notified RN    Skin Integrity:  No pressure injuries or edema documented  Surgical incisions and left arm skin tear noted  Maurizio score 20    24 @ 07:01  -  24 @ 07:00  --------------------------------------------------------  IN: 0 mL / OUT: 775 mL / NET: -775 mL    Labs:       136  |  108  |  33<H>  ----------------------------<  138<H>  4.7   |  21<L>  |  1.63<H>    Ca    9.1      20 Aug 2024 06:35  Phos  2.4     08-20  Mg     2.1     08-20    Medications:  MEDICATIONS  (STANDING):  acetaminophen     Tablet .. 1000 milliGRAM(s) Oral every 8 hours  allopurinol 100 milliGRAM(s) Oral daily  atorvastatin 80 milliGRAM(s) Oral at bedtime  bacitracin   Ointment 1 Application(s) Topical two times a day  bisacodyl 5 milliGRAM(s) Oral every 12 hours  budesonide 160 MICROgram(s)/formoterol 4.5 MICROgram(s) Inhaler 2 Puff(s) Inhalation once  clopidogrel Tablet 75 milliGRAM(s) Oral daily  finasteride 5 milliGRAM(s) Oral daily  heparin   Injectable 5000 Unit(s) SubCutaneous every 8 hours  insulin lispro (ADMELOG) corrective regimen sliding scale   SubCutaneous every 6 hours  lactulose Syrup 10 Gram(s) Oral daily  lidocaine   4% Patch 1 Patch Transdermal every 24 hours  lidocaine   4% Patch 1 Patch Transdermal every 24 hours  methocarbamol 500 milliGRAM(s) Oral every 8 hours  metoprolol succinate ER 25 milliGRAM(s) Oral daily  multivitamin 1 Tablet(s) Oral daily  polyethylene glycol 3350 17 Gram(s) Oral two times a day  senna 2 Tablet(s) Oral two times a day  tamsulosin 0.8 milliGRAM(s) Oral at bedtime    MEDICATIONS  (PRN):  ALPRAZolam 0.5 milliGRAM(s) Oral every 12 hours PRN Anxiety  benzocaine/menthol Lozenge 1 Lozenge Oral four times a day PRN Sore Throat  naloxone Injectable 0.4 milliGRAM(s) IV Push once PRN Signs of opioid overdose  traMADol 50 milliGRAM(s) Oral every 6 hours PRN Severe Pain (7 - 10)  traMADol 25 milliGRAM(s) Oral every 6 hours PRN Moderate Pain (4 - 6)  zolpidem 5 milliGRAM(s) Oral at bedtime PRN Insomnia    Anthropometrics:  Height: 5'9"  Weight: 153lb/69.3kg  BMI: 22.5  IBW: 160lb/72.7kg              96%IBW    Weight Change:   No new weights obtained since admission. Recommend nursing to trend weekly weights. RD to continue monitoring weights as able.     Estimated energy needs:   Calories: 23-28kcal/k-1940kcal/d  Protein: 1.1-1.3g/k-90g/d  Fluids: 30-35mL/k-2425mL/d  Estimated needs based on dosing wt as within % IBW 160lb/72.7kg (96%). Needs adjusted for age and post-op healing.     Subjective:   84M with PMH of HTN, HLD, BPH, DM2, CAD (status post stent x4 yrs ago), +antiphospholipid antibody, bilateral LE DVT, CKD, nephrolithiasis, gout, and interstitial lung disease, L1-S1 fusion () with prior work-up for low back pain with imagining showing severe degenerative disease who admitted for extension of fusion to T10. Now status post revision T10-L2 fusion, laminectomy T2/L1 ().     Pt seen on 8LA for follow-up. Labs and medication orders reviewed. Ordered for multivitamin. Na/K/Mg WNL, Phos 2.4 <L>, BUN/Cr 33/1.63 <H>, POC blood glucose () 159. On Consistent Carbohydrate diet. Pt reports completing >75% meals despite disliking the food in-house, endorses frustration with repetitive meals in setting of diet order restrictions. Endorses irregular BMs during admission, constipation now relived with x2 loose BMs yesterday per RN. Pt endorses abdominal pain - RD notified RN, RN suspects related to urinary retention and plans to bladder scan. Pt denies difficulty chewing/swallowing. See nutrition recommendations. RD to remain available.     Previous Nutrition Diagnosis:  Increased nutrient needs related to increased metabolic demand as evidenced by status post revision T10-L2 fusion, laminectomy T2/L1.    Active [ x ]  Resolved [   ]    Goal:  Pt to consistently meet >75% nutrient needs.     Recommendations:  1. Recommend Regular diet to promote menu variety, HgbA1 6.1%.   >>Encourage & monitor PO intake. [place] dietary preferences as able.   2. Continue micronutrient supplementation.   3. Monitor GI tolerance, weight trends, labs, & skin integrity.  4. Defer bowel and pain regimens to team.   5. RD to remain available for diet education/intervention prn.     Education:   Encouraged continued adequate intake of meals with emphasis on protein foods. Pt aware RD remains available for additional questions/concerns.     Risk Level: High [ x ] Moderate [   ] Low [   ]

## 2024-08-20 NOTE — PROGRESS NOTE ADULT - SUBJECTIVE AND OBJECTIVE BOX
PAIN MANAGEMENT CONSULT NOTE    Interval Events:  - Cr repeated, 1.63 this AM from 1.79 last week      Since yesterday 6am, pt has required:  - tramadol 25mg x 1, 50mg x 2      HOME MEDICATIONS:   Please refer to initial HNP    Allergies    latex (Blisters)  No Known Drug Allergies    Intolerances        PAIN MEDICATIONS:  acetaminophen     Tablet .. 1000 milliGRAM(s) Oral every 8 hours  ALPRAZolam 0.5 milliGRAM(s) Oral every 12 hours PRN  methocarbamol 500 milliGRAM(s) Oral every 8 hours  traMADol 25 milliGRAM(s) Oral every 6 hours PRN  traMADol 50 milliGRAM(s) Oral every 6 hours PRN  zolpidem 5 milliGRAM(s) Oral at bedtime PRN    Heme:  clopidogrel Tablet 75 milliGRAM(s) Oral daily  heparin   Injectable 5000 Unit(s) SubCutaneous every 8 hours    Antibiotics:    Cardiovascular:  metoprolol succinate ER 25 milliGRAM(s) Oral daily    GI:  bisacodyl 5 milliGRAM(s) Oral every 12 hours  lactulose Syrup 10 Gram(s) Oral daily  polyethylene glycol 3350 17 Gram(s) Oral two times a day  senna 2 Tablet(s) Oral two times a day    Endocrine:  allopurinol 100 milliGRAM(s) Oral daily  atorvastatin 80 milliGRAM(s) Oral at bedtime  finasteride 5 milliGRAM(s) Oral daily  insulin lispro (ADMELOG) corrective regimen sliding scale   SubCutaneous every 6 hours    All Other Medications:  bacitracin   Ointment 1 Application(s) Topical two times a day  benzocaine/menthol Lozenge 1 Lozenge Oral four times a day PRN  lidocaine   4% Patch 1 Patch Transdermal every 24 hours  lidocaine   4% Patch 1 Patch Transdermal every 24 hours  multivitamin 1 Tablet(s) Oral daily  naloxone Injectable 0.4 milliGRAM(s) IV Push once PRN  potassium phosphate / sodium phosphate Powder (PHOS-NaK) 1 Packet(s) Oral once  tamsulosin 0.8 milliGRAM(s) Oral at bedtime      Vital Signs Last 24 Hrs  T(C): 36.6 (20 Aug 2024 04:46), Max: 37 (19 Aug 2024 22:06)  T(F): 97.8 (20 Aug 2024 04:46), Max: 98.6 (19 Aug 2024 22:06)  HR: 62 (20 Aug 2024 05:00) (62 - 90)  BP: 138/65 (20 Aug 2024 05:00) (107/55 - 158/65)  BP(mean): 93 (20 Aug 2024 05:00) (75 - 100)  RR: 18 (20 Aug 2024 05:00) (17 - 18)  SpO2: 97% (20 Aug 2024 05:00) (97% - 98%)    Parameters below as of 20 Aug 2024 05:00  Patient On (Oxygen Delivery Method): room air        LABS:                        8.0    6.04  )-----------( 189      ( 20 Aug 2024 06:35 )             26.0     08-20    136  |  108  |  33<H>  ----------------------------<  138<H>  4.7   |  21<L>  |  1.63<H>    Ca    9.1      20 Aug 2024 06:35  Phos  2.4     08-20  Mg     2.1     08-20        Urinalysis Basic - ( 20 Aug 2024 06:35 )    Color: x / Appearance: x / SG: x / pH: x  Gluc: 138 mg/dL / Ketone: x  / Bili: x / Urobili: x   Blood: x / Protein: x / Nitrite: x   Leuk Esterase: x / RBC: x / WBC x   Sq Epi: x / Non Sq Epi: x / Bacteria: x        RADIOLOGY:    Drug Screen:        REVIEW OF SYSTEMS:  CONSTITUTIONAL: Denies fever or fatigue   EYES: Denies eye pain, visual disturbances  HEENT: Denies difficulty hearing, throat/neck pain or stiffness  RESPIRATORY: Denies SOB, cough, wheezing  CARDIOVASCULAR: Denies chest pain, palpitations.   GASTROINTESTINAL: Endorses +flatus, BMs. Denies nausea, vomiting, abdominal or epigastric pain.   GENITOURINARY: Denies dysuria, frequency, or incontinence  NEUROLOGICAL: Endorses *** numbness, tingling. Denies headaches, loss of strength, tremors, dizziness or lightheadedness with pain medications.   MUSCULOSKELETAL: Denies joint pain or swelling      FUNCTIONAL ASSESSMENT:  PAIN SCORE AT REST:         SCALE USED: (1-10 VNRS)  PAIN SCORE WITH ACTIVITY:         SCALE USED: (1-10 VNRS)    PAIN ASSESSMENT:    FOCUSED PHYSICAL EXAM  GENERAL: Laying in bed, NAD  NEURO: CN II-XII grossly intact, EOMI  PULM: unlabored  CV: Regular rate and rhythm  ABDOMEN: Soft, Nontender, Nondistended  EXTREMITIES:  2+ Peripheral Pulses, No clubbing, cyanosis, or edema  SKIN: No rashes or lesions      ASSESSMENT:   83 y/o male with hx HTN, HLD, BPH,  T2DM (Hgba1c 6.7), CAD s/p stent about 4 yrs ago (on Plavix), +antiphospholipid antibody w history of B/L LE DVT was on Xarelto (4/2023 dx), CKD, Nephrolithiasis, Gout, Interstitial Lung Disease, L1-S1 fusion in 2020 @[facility]. Was worked up for severe, worsening low back pain, worse with ambulation. 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 Now s/p revision T10-L2 fusion, laminectomy T2/L1 (8/12).      PLAN:   - schedule tylenol 1gm q8h  - continue robaxin 500mg q8h  - can hold home suboxone 2mg BID  - continue tramadol 25-50mg q6h prn moderate-severe pain  - monitor closely for oversedation, ensure narcan is ordered  - escalate bowel regimen as needed for bowel movement daily  ***recs not yet finalized***    - Bowel regimen: Senna, miralax   - Nausea ppx: Zofran as needed  - Functional Goals: Pt will get OOB with PT today. Pt will resume previous level of activity without impairment from surgery.   - Additional Consults: None recommended.   - Additional Labs/Imaging:  None recommended.     - Discharge Planning: per primary team  - Pain Management follow up plan: will continue to follow    Plan d/w Dr. Samuel.     Ruthie Harrison NP  Acute Pain Service PAIN MANAGEMENT CONSULT NOTE    Interval Events:  - Cr repeated, 1.63 this AM from 1.79 last week  - ambulating in room with assistance this AM      Since yesterday 6am, pt has required:  - tramadol 25mg x 1, 50mg x 2      HOME MEDICATIONS:   Please refer to initial HNP    Allergies    latex (Blisters)  No Known Drug Allergies    Intolerances        PAIN MEDICATIONS:  acetaminophen     Tablet .. 1000 milliGRAM(s) Oral every 8 hours  ALPRAZolam 0.5 milliGRAM(s) Oral every 12 hours PRN  methocarbamol 500 milliGRAM(s) Oral every 8 hours  traMADol 25 milliGRAM(s) Oral every 6 hours PRN  traMADol 50 milliGRAM(s) Oral every 6 hours PRN  zolpidem 5 milliGRAM(s) Oral at bedtime PRN    Heme:  clopidogrel Tablet 75 milliGRAM(s) Oral daily  heparin   Injectable 5000 Unit(s) SubCutaneous every 8 hours    Antibiotics:    Cardiovascular:  metoprolol succinate ER 25 milliGRAM(s) Oral daily    GI:  bisacodyl 5 milliGRAM(s) Oral every 12 hours  lactulose Syrup 10 Gram(s) Oral daily  polyethylene glycol 3350 17 Gram(s) Oral two times a day  senna 2 Tablet(s) Oral two times a day    Endocrine:  allopurinol 100 milliGRAM(s) Oral daily  atorvastatin 80 milliGRAM(s) Oral at bedtime  finasteride 5 milliGRAM(s) Oral daily  insulin lispro (ADMELOG) corrective regimen sliding scale   SubCutaneous every 6 hours    All Other Medications:  bacitracin   Ointment 1 Application(s) Topical two times a day  benzocaine/menthol Lozenge 1 Lozenge Oral four times a day PRN  lidocaine   4% Patch 1 Patch Transdermal every 24 hours  lidocaine   4% Patch 1 Patch Transdermal every 24 hours  multivitamin 1 Tablet(s) Oral daily  naloxone Injectable 0.4 milliGRAM(s) IV Push once PRN  potassium phosphate / sodium phosphate Powder (PHOS-NaK) 1 Packet(s) Oral once  tamsulosin 0.8 milliGRAM(s) Oral at bedtime      Vital Signs Last 24 Hrs  T(C): 36.6 (20 Aug 2024 04:46), Max: 37 (19 Aug 2024 22:06)  T(F): 97.8 (20 Aug 2024 04:46), Max: 98.6 (19 Aug 2024 22:06)  HR: 62 (20 Aug 2024 05:00) (62 - 90)  BP: 138/65 (20 Aug 2024 05:00) (107/55 - 158/65)  BP(mean): 93 (20 Aug 2024 05:00) (75 - 100)  RR: 18 (20 Aug 2024 05:00) (17 - 18)  SpO2: 97% (20 Aug 2024 05:00) (97% - 98%)    Parameters below as of 20 Aug 2024 05:00  Patient On (Oxygen Delivery Method): room air        LABS:                        8.0    6.04  )-----------( 189      ( 20 Aug 2024 06:35 )             26.0     08-20    136  |  108  |  33<H>  ----------------------------<  138<H>  4.7   |  21<L>  |  1.63<H>    Ca    9.1      20 Aug 2024 06:35  Phos  2.4     08-20  Mg     2.1     08-20        Urinalysis Basic - ( 20 Aug 2024 06:35 )    Color: x / Appearance: x / SG: x / pH: x  Gluc: 138 mg/dL / Ketone: x  / Bili: x / Urobili: x   Blood: x / Protein: x / Nitrite: x   Leuk Esterase: x / RBC: x / WBC x   Sq Epi: x / Non Sq Epi: x / Bacteria: x        RADIOLOGY:    Drug Screen:        REVIEW OF SYSTEMS:  CONSTITUTIONAL: Denies fever or fatigue   EYES: Denies eye pain, visual disturbances  HEENT: Denies difficulty hearing, throat/neck pain or stiffness  RESPIRATORY: Denies SOB, cough, wheezing  CARDIOVASCULAR: Denies chest pain, palpitations.   GASTROINTESTINAL: Endorses +flatus, BMs. Denies nausea, vomiting, abdominal or epigastric pain.   GENITOURINARY: Denies dysuria, frequency, or incontinence  NEUROLOGICAL: Denies new numbness, tingling. Denies headaches, loss of strength, tremors, dizziness or lightheadedness with pain medications.   MUSCULOSKELETAL: Denies joint pain or swelling      FUNCTIONAL ASSESSMENT:  PAIN SCORE AT REST:         SCALE USED: (1-10 VNRS)  PAIN SCORE WITH ACTIVITY:         SCALE USED: (1-10 VNRS)    PAIN ASSESSMENT:  - 6/10 low back/incisional pain, improves with tramadol    FOCUSED PHYSICAL EXAM  GENERAL: Laying in bed, NAD  NEURO: CN II-XII grossly intact, EOMI  PULM: unlabored  CV: Regular rate and rhythm  ABDOMEN: Soft, Nontender, Nondistended  EXTREMITIES:  2+ Peripheral Pulses, No clubbing, cyanosis, or edema  SKIN: No rashes or lesions      ASSESSMENT:   83 y/o male with hx HTN, HLD, BPH,  T2DM (Hgba1c 6.7), CAD s/p stent about 4 yrs ago (on Plavix), +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. Was worked up for severe, worsening low back pain, worse with ambulation. 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 Now s/p revision T10-L2 fusion, laminectomy T2/L1 (8/12).      PLAN:   - schedule tylenol 1gm q8h  - continue robaxin 500mg q8h  - can hold home suboxone 2mg BID  - continue tramadol 25-50mg q6h prn moderate-severe pain  - monitor closely for oversedation, ensure narcan is ordered  - escalate bowel regimen as needed for bowel movement daily  ***recs not yet finalized***    - Bowel regimen: Senna, miralax   - Nausea ppx: Zofran as needed  - Functional Goals: Pt will get OOB with PT today. Pt will resume previous level of activity without impairment from surgery.   - Additional Consults: None recommended.   - Additional Labs/Imaging:  None recommended.     - Discharge Planning: per primary team  - Pain Management follow up plan: will continue to follow    Plan d/w Dr. Samuel.     Ruthie Harrison NP  Acute Pain Service PAIN MANAGEMENT CONSULT NOTE    Interval Events:  - Cr repeated, 1.63 this AM from 1.79 last week  - ambulating in room with assistance this AM      Since yesterday 6am, pt has required:  - tramadol 25mg x 1, 50mg x 2      HOME MEDICATIONS:   Please refer to initial HNP    Allergies    latex (Blisters)  No Known Drug Allergies    Intolerances        PAIN MEDICATIONS:  acetaminophen     Tablet .. 1000 milliGRAM(s) Oral every 8 hours  ALPRAZolam 0.5 milliGRAM(s) Oral every 12 hours PRN  methocarbamol 500 milliGRAM(s) Oral every 8 hours  traMADol 25 milliGRAM(s) Oral every 6 hours PRN  traMADol 50 milliGRAM(s) Oral every 6 hours PRN  zolpidem 5 milliGRAM(s) Oral at bedtime PRN    Heme:  clopidogrel Tablet 75 milliGRAM(s) Oral daily  heparin   Injectable 5000 Unit(s) SubCutaneous every 8 hours    Antibiotics:    Cardiovascular:  metoprolol succinate ER 25 milliGRAM(s) Oral daily    GI:  bisacodyl 5 milliGRAM(s) Oral every 12 hours  lactulose Syrup 10 Gram(s) Oral daily  polyethylene glycol 3350 17 Gram(s) Oral two times a day  senna 2 Tablet(s) Oral two times a day    Endocrine:  allopurinol 100 milliGRAM(s) Oral daily  atorvastatin 80 milliGRAM(s) Oral at bedtime  finasteride 5 milliGRAM(s) Oral daily  insulin lispro (ADMELOG) corrective regimen sliding scale   SubCutaneous every 6 hours    All Other Medications:  bacitracin   Ointment 1 Application(s) Topical two times a day  benzocaine/menthol Lozenge 1 Lozenge Oral four times a day PRN  lidocaine   4% Patch 1 Patch Transdermal every 24 hours  lidocaine   4% Patch 1 Patch Transdermal every 24 hours  multivitamin 1 Tablet(s) Oral daily  naloxone Injectable 0.4 milliGRAM(s) IV Push once PRN  potassium phosphate / sodium phosphate Powder (PHOS-NaK) 1 Packet(s) Oral once  tamsulosin 0.8 milliGRAM(s) Oral at bedtime      Vital Signs Last 24 Hrs  T(C): 36.6 (20 Aug 2024 04:46), Max: 37 (19 Aug 2024 22:06)  T(F): 97.8 (20 Aug 2024 04:46), Max: 98.6 (19 Aug 2024 22:06)  HR: 62 (20 Aug 2024 05:00) (62 - 90)  BP: 138/65 (20 Aug 2024 05:00) (107/55 - 158/65)  BP(mean): 93 (20 Aug 2024 05:00) (75 - 100)  RR: 18 (20 Aug 2024 05:00) (17 - 18)  SpO2: 97% (20 Aug 2024 05:00) (97% - 98%)    Parameters below as of 20 Aug 2024 05:00  Patient On (Oxygen Delivery Method): room air        LABS:                        8.0    6.04  )-----------( 189      ( 20 Aug 2024 06:35 )             26.0     08-20    136  |  108  |  33<H>  ----------------------------<  138<H>  4.7   |  21<L>  |  1.63<H>    Ca    9.1      20 Aug 2024 06:35  Phos  2.4     08-20  Mg     2.1     08-20        Urinalysis Basic - ( 20 Aug 2024 06:35 )    Color: x / Appearance: x / SG: x / pH: x  Gluc: 138 mg/dL / Ketone: x  / Bili: x / Urobili: x   Blood: x / Protein: x / Nitrite: x   Leuk Esterase: x / RBC: x / WBC x   Sq Epi: x / Non Sq Epi: x / Bacteria: x        RADIOLOGY:    Drug Screen:        REVIEW OF SYSTEMS:  CONSTITUTIONAL: Denies fever or fatigue   EYES: Denies eye pain, visual disturbances  HEENT: Denies difficulty hearing, throat/neck pain or stiffness  RESPIRATORY: Denies SOB, cough, wheezing  CARDIOVASCULAR: Denies chest pain, palpitations.   GASTROINTESTINAL: Endorses +flatus, BMs. Denies nausea, vomiting, abdominal or epigastric pain.   GENITOURINARY: Denies dysuria, frequency, or incontinence  NEUROLOGICAL: Denies new numbness, tingling. Denies headaches, loss of strength, tremors, dizziness or lightheadedness with pain medications.   MUSCULOSKELETAL: Denies joint pain or swelling      FUNCTIONAL ASSESSMENT:  PAIN SCORE AT REST:         SCALE USED: (1-10 VNRS)  PAIN SCORE WITH ACTIVITY:         SCALE USED: (1-10 VNRS)    PAIN ASSESSMENT:  - 6/10 low back/incisional pain, improves with tramadol    FOCUSED PHYSICAL EXAM  GENERAL: Laying in bed, NAD  NEURO: CN II-XII grossly intact, EOMI  PULM: unlabored  CV: Regular rate and rhythm  ABDOMEN: Soft, Nontender, Nondistended  EXTREMITIES:  2+ Peripheral Pulses, No clubbing, cyanosis, or edema  SKIN: No rashes or lesions      ASSESSMENT:   85 y/o male with hx HTN, HLD, BPH,  T2DM (Hgba1c 6.7), CAD s/p stent about 4 yrs ago (on Plavix), +antiphospholipid antibody w history of B/L LE DVT was on Xarelto (4/2023 dx), CKD, Nephrolithiasis, Gout, Interstitial Lung Disease, L1-S1 fusion in 2020 @[facility]. Was worked up for severe, worsening low back pain, worse with ambulation. 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 Now s/p revision T10-L2 fusion, laminectomy T2/L1 (8/12).      PLAN:   - schedule tylenol 1gm q8h  - continue robaxin 500mg q8h  - can hold home suboxone 2mg BID  - continue tramadol 25-50mg q6h prn moderate-severe pain  - monitor closely for oversedation, ensure narcan is ordered  - escalate bowel regimen as needed for bowel movement daily      - Bowel regimen: Senna, miralax   - Nausea ppx: Zofran as needed  - Functional Goals: Pt will get OOB with PT today. Pt will resume previous level of activity without impairment from surgery.   - Additional Consults: None recommended.   - Additional Labs/Imaging:  None recommended.     - Discharge Planning: per primary team  - Pain Management follow up plan: will continue to follow    Plan d/w Dr. Samuel.     Ruthie Harrison NP  Acute Pain Service

## 2024-08-20 NOTE — PROGRESS NOTE ADULT - ASSESSMENT
85 y/o male with hx HTN, HLD, BPH, T2DM (Hgba1c 6.7), CAD s/p remote stent to LAD (on Plavix), DVT b/l LEs in 4/23 with + Cardiolipin, CKD, Nephrolithiasis, Gout, Interstitial Lung Disease, L1-S1 fusion in 2020 @[facility] who was worked up for severe, worsening low back pain, worse with ambulation. 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 Now s/p revision T10-L2 fusion, laminectomy T2/L1 (8/12)    #Severe lumbar degenerative disease s/p revision T10-L2 fusion, laminectomy T2/L1 (8/12)  #Constipation   - Pain control per primary team and pain management  - Continue Miralax, Senna and Lactulose; Last bowel movement 8/19   - Continue drain management as per NSGY team   - Encourage incentive spirometer, OOB as tolerated    #CAD with remote PCI  #HLD  - Continue Plavix and Atorvastatin     #DM  - well controlled, A1c < 7  - Continue to monitor FS ( WNL) and ISS    #Hx of B/l DVT  #Cardiolipin +  - DVTs in 4/23  - Pt has been on Eliquis 5mg BID in the past   - Charts reviewed, anticardiolipin antibody + in April 2023 after dx with DVTs, was on DOAC at the time of testing. Possibly false positive.   -  Has been recommended for heme referral since then but has not established or had repeat testing, DOAC held currently but possibly should be on Warfarin.  Would repeat testing now that he is off DOAC but patient refusing all blood work at this time.  - Will check anticardiolipin antibody if pt allows   - Discussed risk of not confirming Anticardiolipin status with Patient and family member at bedside , patients wants to follow up with PCP     # Depression  # Anxiety   - Pt was seen by behavioral health during this admission   -Continue Alprazolam prn     # HTN  - The pt was previously was on Toprol 25mg, Enalapril 5mg, Amlodipine 5mg daily  and Lasix 80mg BID but having episodes of hypotension  -Continue  Toprol 25mg once daily , and if BP remains > 130/80 can consider restarting Enalapril , followed by lasix 40mg po bid       # BPH with urinary retention  # Urethral stricture   - c/w Finasteride and Flomax  - able to void spontaneously   - seen by urology during this admission     #ILD  - Pt to follow up with Pulmonary   - c/w Symbicort    #Gout  - c/w home allopurinol    # CKD Stage 3   - Pt's creatinine remains stable    - Pt  follows outpt with Dr Araujo  - Will continue to monitor creatinine if pt allows     # Normocytic Anemia   - no melena , no hematochezia   - monitor hemoglobin     #DVT ppx  -Continue Heparin  SQ q8     #DISPO  -Pt was seen by PT and recommended for Acute rehab     40 minutes spent on total encounter. The necessity of the time spent during the encounter on this date of service was due to:    Review of hospital course, labs, vitals, radiology and medical records.  Direct patient encounter including bedside exam   Discussed plan of care with primary team   Documenting the encounter.

## 2024-08-20 NOTE — DISCHARGE NOTE NURSING/CASE MANAGEMENT/SOCIAL WORK - NSDCVIVACCINE_GEN_ALL_CORE_FT
Tdap; 07-Nov-2016 15:40; Juan Francisco Islas (DIPIKA); [employer]; [id number]; IntraMuscular; Deltoid Left.; 0.5 milliLiter(s); VIS (VIS Published: 09-May-2013, VIS Presented: 07-Nov-2016);

## 2024-08-20 NOTE — PROGRESS NOTE ADULT - PROVIDER SPECIALTY LIST ADULT
Hospitalist
Hospitalist
NSICU
Neurosurgery
Pain Medicine
Hospitalist
NSICU
Neurosurgery
Pain Medicine
Hospitalist
Neurosurgery
Pain Medicine
Neurosurgery
NSICU

## 2024-08-20 NOTE — PROGRESS NOTE ADULT - REASON FOR ADMISSION
T10-L2 revision of fusion

## 2024-08-20 NOTE — PROCEDURE NOTE - ATTENDING PROVIDER
Dr. Guzmán
Dr. Hernandez
Dr. Hernandez
Simple: Patient demonstrates quick and easy understanding/Verbalized Understanding

## 2024-08-20 NOTE — PROGRESS NOTE ADULT - SUBJECTIVE AND OBJECTIVE BOX
HPI:  85 y/o male with hx HTN, HLD, BPH,  T2DM (Hgba1c 6.7), CAD s/p stent~ 4 yrs ago (on Plavix), +antiphospholipid antibody w history of B/L LE DVT was on elliquis (4/2023 dx), CKD, Nephrolithiasis, Gout, Interstitial Lung Disease, L1-S1 fusion in 2020 @[facility].   Hx lower back pain radiating to right buttocks. He denies pain radiating to the lower extremities. Currently he can walk for approximately 1 block until the pain becomes debilitating and he has to stop. He also finds himself hunched over when ambulating. 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   He denies any urinary/bowel dysfunction, saddle anesthesia. Today patient also reports that he did not stop the suboxone as instructed (was supposed to stop 3 days preop) because he didn't want to take oxycodone.   (12 Aug 2024 06:34)      Subjective:  o/n patient still refusing labs but c/o severe pain. Given tramadol with approval of pain management team.     Hospital course:  8/12: POD 0 s/p revision T10-L2 fusion, laminectomy T2/L1. DC precedex for bradycardia to 40s. Zofran DC'd for borderline QTc. Atropine @ bedside for 1st deg heart block. Warmed IVF, bearhugger for hypothermia 90F w/ improvement. DC'd ketamine gtt for somnolence.   8/13: POD 1 revision T10-L2 fusion, T2/L1 lami. TRANG overnight. 500cc bolus for soft SBP. hematuria due pt pulling on rothman.   8/14: POD2 s/p revision T10-L2 fusion, T2/L1 lami. Significant pain overnight, continued Oxy 10/15mg PRN, methadone 2.5mg q8hrs, given ambien and Xanax PRN overnight for anxiety, CP w/u with EKG stable, HR stable and OR interval improved. Cont to f/u BH recs. Pain mgmt following, need adjustment in pain recs. Pend postop xrays and LE dopplers. Pend AR. Dc'd methadone per pain recs. LE dopplers negative  8/15: POD3. TRANG o/n, APLS labs sent. post op xrays complete. oxy and gabapentin held due to oversedation   8/16: POD4. refused mirtazipine o/n. decreased oxy to 2.5 for moderate pain, HMV dc. Given xanax 0.5mg for anxiety. Given lactulose 10mg x 1 for constipation. Dressing over HMV changed.   8/17: POD5, TRANG o/n. Added lactulose 10mg qd (home med). Attempted to elope in morning, refer to event note. Increased Flomax to 0.8mg in the evening, urology consulted, NTD for urethral stricture, rec rothman if cont straight cath. Given xanax x 1 at bedtime for anxiety.   8/18: POD6. Pt complaining of pain overnight, refusing pain medications. Pending BM, given suppository, +BM. Trialing tramadol for pain. Given ambien and xanax.   8/19: POD 7. R ankle XR ordered overnight for severe R ankle pain, negative for fracture. Resumed Plavix. resumed home Toprol.  8/20: POD8, o/n patient still refusing labs but c/o severe pain. Given tramadol with approval of pain management team.     Vital Signs Last 24 Hrs  T(C): 37 (19 Aug 2024 22:06), Max: 37.2 (19 Aug 2024 05:01)  T(F): 98.6 (19 Aug 2024 22:06), Max: 98.9 (19 Aug 2024 05:01)  HR: 68 (20 Aug 2024 00:13) (62 - 90)  BP: 107/55 (20 Aug 2024 00:13) (107/55 - 158/71)  BP(mean): 75 (20 Aug 2024 00:13) (75 - 102)  RR: 17 (20 Aug 2024 00:13) (17 - 18)  SpO2: 97% (20 Aug 2024 00:13) (97% - 98%)    Parameters below as of 20 Aug 2024 00:13  Patient On (Oxygen Delivery Method): room air        I&O's Summary    18 Aug 2024 07:01  -  19 Aug 2024 07:00  --------------------------------------------------------  IN: 0 mL / OUT: 1185 mL / NET: -1185 mL    19 Aug 2024 07:01  -  20 Aug 2024 01:31  --------------------------------------------------------  IN: 0 mL / OUT: 775 mL / NET: -775 mL        PHYSICAL EXAM:  General: patient seen laying supine in bed in NAD  Neuro: AAOx3, follows commands, opens eyes spontaneously, speech clear and fluent,  face symmetric, strength 5/5 b/l upper extremities and lower extremities, sensation intact to light touch throughout  HEENT: PERRL  Neck: supple  Cardiac: RRR, S1S2  Pulmonary: chest rise symmetric  Abdomen: soft, nontender, nondistended  Ext: perfusing well  Skin: warm, dry  Wound: thoracolumbar aquacel dressing c/d/i, EZEKIEL x 1          LABS:                  CAPILLARY BLOOD GLUCOSE          Drug Levels: [] N/A    CSF Analysis: [] N/A      Allergies    latex (Blisters)  No Known Drug Allergies    Intolerances      MEDICATIONS:  Antibiotics:    Neuro:  acetaminophen     Tablet .. 1000 milliGRAM(s) Oral every 8 hours  ALPRAZolam 0.5 milliGRAM(s) Oral every 12 hours PRN  methocarbamol 500 milliGRAM(s) Oral every 8 hours  traMADol 25 milliGRAM(s) Oral every 4 hours PRN  traMADol 50 milliGRAM(s) Oral every 4 hours PRN  zolpidem 5 milliGRAM(s) Oral at bedtime PRN    Anticoagulation:  clopidogrel Tablet 75 milliGRAM(s) Oral daily  heparin   Injectable 5000 Unit(s) SubCutaneous every 8 hours    OTHER:  allopurinol 100 milliGRAM(s) Oral daily  atorvastatin 80 milliGRAM(s) Oral at bedtime  bacitracin   Ointment 1 Application(s) Topical two times a day  benzocaine/menthol Lozenge 1 Lozenge Oral four times a day PRN  bisacodyl 5 milliGRAM(s) Oral every 12 hours  budesonide 160 MICROgram(s)/formoterol 4.5 MICROgram(s) Inhaler 2 Puff(s) Inhalation once  finasteride 5 milliGRAM(s) Oral daily  insulin lispro (ADMELOG) corrective regimen sliding scale   SubCutaneous every 6 hours  lactulose Syrup 10 Gram(s) Oral daily  lidocaine   4% Patch 1 Patch Transdermal every 24 hours  lidocaine   4% Patch 1 Patch Transdermal every 24 hours  metoprolol succinate ER 25 milliGRAM(s) Oral daily  naloxone Injectable 0.4 milliGRAM(s) IV Push once PRN  polyethylene glycol 3350 17 Gram(s) Oral two times a day  senna 2 Tablet(s) Oral two times a day  tamsulosin 0.8 milliGRAM(s) Oral at bedtime    IVF:  multivitamin 1 Tablet(s) Oral daily    CULTURES:    RADIOLOGY & ADDITIONAL TESTS:    M40.205    Handoff    MEWS Score    Diabetes    BPH (benign prostatic hypertrophy)    Hyperlipidemia    Gout    HTN (hypertension)    CAD (coronary artery disease)    Chronic kidney disease (CKD)    Back pain    Deep vein thrombosis (DVT)    H/O kyphosis    CRI (chronic renal insufficiency)    History of DVT in adulthood    Venous insufficiency    H/O: depression    H/O edema    Chronic pain syndrome    PAF (paroxysmal atrial fibrillation)    History of TIAs    HTN (hypertension)    CAD (coronary artery disease)    Lumbar pseudoarthrosis    Lumbar pseudoarthrosis    Major depression    Depression    Unspecified kyphosis, thoracolumbar region    Revision, fusion, spine, thoracic    H/O heart artery stent    H/O shoulder surgery    History of back surgery    H/O neck surgery    H/O total knee replacement    PAF (paroxysmal atrial fibrillation)    Room Service Assist    Hypertension    Hyperlipidemia    BPH (benign prostatic hyperplasia)    Type 2 diabetes mellitus    CAD (coronary artery disease)    SysAdmin_VstLnk        ASSESSMENT:  85 y/o male with hx HTN, HLD, BPH,  T2DM (Hgba1c 6.7), CAD s/p stent about 4 yrs ago (on Plavix), +antiphospholipid antibody w history of B/L LE DVT was on Eliquis (4/2023 dx), CKD, Nephrolithiasis, Gout, Interstitial Lung Disease, L1-S1 fusion in 2020 @[facility]. Was worked up for severe, worsening low back pain, worse with ambulation. 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 Now s/p revision T10-L2 fusion, laminectomy T2/L1 (8/12).    Neuro:  - Neuro/vitals q4hr  - Pain control: tylenol prn, robaxin 500q8, lidocaine patches, tramadol 25/50 prn,  holding gabapentin for oversedation (holding home buprenorphine)  - pain mgmt following  - HMV x1 dc 8/16, EZEKIEL x1, per plastics  - Insomnia/anxiety: Mirtazapine dc'd, ambien 5mg prn, xanax 0.5mg PO prn  - postop standing XRs complete    Cardio:  - SBP <160  - EKG 8/12: 1st degree heart block  - Hx cardiac stents: c/w home plavix    - HLD: atorvastatin 80mg qd  - hx HTN: c/w home Toprol 25 XL, holding home enalapril 5mg, amlodipine 5mg, lasix 80mg BID    Pulm:  - RA  - ILD: continue home Symbicort     GI:  - Regular  - Bowel regimen, LBM 8/19    Renal:  - IVL  - urethral stricture, sc prn for retention (pt refusing intermittently)  - urology consulted, rec rothman if continued sc needs  - CKD: trend Cr   - BPH: home finaseteride, flomax  - Gout: allopurinol 100mg daily    Heme:  - DVT ppx: SCDs, SQH  - hx b/l LE DVTs: LE dopplers 8/14: no DVT     ID:  - afebrile    Misc:  - R ankle XR done, negative for fracture/dislocation    Dispo: Stepdown, full code, pending AR    D/w Dr Hernandez    Assessment:  Present when checked    []  GCS  E   V  M     Heart Failure: []Acute, [] acute on chronic , []chronic  Heart Failure:  [] Diastolic (HFpEF), [] Systolic (HFrEF), []Combined (HFpEF and HFrEF), [] RHF, [] Pulm HTN, [] Other    [] JONH, [] ATN, [] AIN, [] other  [] CKD1, [] CKD2, [] CKD 3, [] CKD 4, [] CKD 5, []ESRD    Encephalopathy: [] Metabolic, [] Hepatic, [] toxic, [] Neurological, [] Other    Abnormal Nurtitional Status: [] malnurtition (see nutrition note), [ ]underweight: BMI < 19, [] morbid obesity: BMI >40, [] Cachexia    [] Sepsis  [] hypovolemic shock,[] cardiogenic shock, [] hemorrhagic shock, [] neuogenic shock  [] Acute Respiratory Failure  []Cerebral edema, [] Brain compression/ herniation,   [] Functional quadriplegia  [] Acute blood loss anemia

## 2024-08-20 NOTE — PROGRESS NOTE ADULT - SUBJECTIVE AND OBJECTIVE BOX
Patient was seen and examined at bedside.   he is resting in bed   calm and comfortable   wants to go to Acute Rehab at [facility]   he never needed a sitter or constant observation  he has been pleasant and is hopeful [facility] will be able to accept him today and wants to recover there   does not have any other complaints     OBJECTIVE:  Vital Signs Last 24 Hrs  T(C): 36.6 (20 Aug 2024 04:46), Max: 37 (19 Aug 2024 22:06)  T(F): 97.8 (20 Aug 2024 04:46), Max: 98.6 (19 Aug 2024 22:06)  HR: 60 (20 Aug 2024 08:12) (60 - 90)  BP: 134/82 (20 Aug 2024 08:12) (107/55 - 158/65)  BP(mean): 101 (20 Aug 2024 08:12) (75 - 101)  RR: 17 (20 Aug 2024 08:12) (17 - 18)  SpO2: 98% (20 Aug 2024 08:12) (97% - 98%)    Parameters below as of 20 Aug 2024 08:12  Patient On (Oxygen Delivery Method): room air          PHYSICAL EXAM:  Constitutional: NAD, elderly male laying in bed; family at bedside  HEENT: EOMI, MMM  Neck: Supple  Respiratory: CTA B/L, no w/r/r  Cardiovascular: RRR, normal S1 and S2  Gastrointestinal: no abdominal  tenderness no rebound or guarding   Extremities: . + minimal b/l LE pitting edema.  Neurological: AAOx3, LE strength limited by pain       MEDICATIONS  (STANDING):  acetaminophen     Tablet .. 1000 milliGRAM(s) Oral every 8 hours  allopurinol 100 milliGRAM(s) Oral daily  atorvastatin 80 milliGRAM(s) Oral at bedtime  bacitracin   Ointment 1 Application(s) Topical two times a day  bisacodyl 5 milliGRAM(s) Oral every 12 hours  budesonide 160 MICROgram(s)/formoterol 4.5 MICROgram(s) Inhaler 2 Puff(s) Inhalation once  clopidogrel Tablet 75 milliGRAM(s) Oral daily  finasteride 5 milliGRAM(s) Oral daily  heparin   Injectable 5000 Unit(s) SubCutaneous every 8 hours  insulin lispro (ADMELOG) corrective regimen sliding scale   SubCutaneous every 6 hours  lactulose Syrup 10 Gram(s) Oral daily  lidocaine   4% Patch 1 Patch Transdermal every 24 hours  lidocaine   4% Patch 1 Patch Transdermal every 24 hours  methocarbamol 500 milliGRAM(s) Oral every 8 hours  metoprolol succinate ER 25 milliGRAM(s) Oral daily  multivitamin 1 Tablet(s) Oral daily  polyethylene glycol 3350 17 Gram(s) Oral two times a day  potassium phosphate / sodium phosphate Powder (PHOS-NaK) 1 Packet(s) Oral once  senna 2 Tablet(s) Oral two times a day  tamsulosin 0.8 milliGRAM(s) Oral at bedtime    MEDICATIONS  (PRN):  ALPRAZolam 0.5 milliGRAM(s) Oral every 12 hours PRN Anxiety  benzocaine/menthol Lozenge 1 Lozenge Oral four times a day PRN Sore Throat  naloxone Injectable 0.4 milliGRAM(s) IV Push once PRN Signs of opioid overdose  traMADol 25 milliGRAM(s) Oral every 6 hours PRN Moderate Pain (4 - 6)  traMADol 50 milliGRAM(s) Oral every 6 hours PRN Severe Pain (7 - 10)  zolpidem 5 milliGRAM(s) Oral at bedtime PRN Insomnia                              8.0    6.04  )-----------( 189      ( 20 Aug 2024 06:35 )             26.0       08-20    136  |  108  |  33<H>  ----------------------------<  138<H>  4.7   |  21<L>  |  1.63<H>    Ca    9.1      20 Aug 2024 06:35  Phos  2.4     08-20  Mg     2.1     08-20

## 2024-08-20 NOTE — DISCHARGE NOTE NURSING/CASE MANAGEMENT/SOCIAL WORK - PATIENT PORTAL LINK FT
You can access the FollowMyHealth Patient Portal offered by [facility] by registering at the following website: [email or web address]. By joining [employer]’s FollowMyHealth portal, you will also be able to view your health information using other applications (apps) compatible with our system.

## 2024-08-20 NOTE — PROCEDURE NOTE - ADDITIONAL PROCEDURE DETAILS
Drain site dressing removed and surrounding area cleansed with chlorhexidine. Anchoring suture removed and drain taken off suction. Drain removed without resistance until tip visualized. Steri strips applied and gauze tegederm dressing placed over top.
Pt was informed on steps of procedure, location/patient name/ confirmed. Drain insertion site was visualized. Drain was taken off suction and insertion site and surrounding area was cleaned with chlorhexidine. HMV drain suture was visualized and removed without difficulty. HMV drain was removed without resistance, and pressure was held to insertion site with gauze. Three steri strips were placed with tension over the drain insertion site. Pt tolerated procedure well.
22 gauge IV inserted under ultrasound guidance

## 2024-08-20 NOTE — DISCHARGE NOTE NURSING/CASE MANAGEMENT/SOCIAL WORK - NSDCFUADDAPPT_GEN_ALL_CORE_FT
Please follow up with Dr. Hernandez on September 6th at 10:00 am in the outpatient office. Call [phone] to confirm appointment.     Please follow up with Dr. Samuel (pain management) outpatient.     Please follow up with your primary care provider.     Please follow up with outpatient psychiatry.

## 2024-08-30 DIAGNOSIS — Z96.653 PRESENCE OF ARTIFICIAL KNEE JOINT, BILATERAL: ICD-10-CM

## 2024-08-30 DIAGNOSIS — N18.30 CHRONIC KIDNEY DISEASE, STAGE 3 UNSPECIFIED: ICD-10-CM

## 2024-08-30 DIAGNOSIS — I48.0 PAROXYSMAL ATRIAL FIBRILLATION: ICD-10-CM

## 2024-08-30 DIAGNOSIS — E11.22 TYPE 2 DIABETES MELLITUS WITH DIABETIC CHRONIC KIDNEY DISEASE: ICD-10-CM

## 2024-08-30 DIAGNOSIS — F32.A DEPRESSION, UNSPECIFIED: ICD-10-CM

## 2024-08-30 DIAGNOSIS — R31.9 HEMATURIA, UNSPECIFIED: ICD-10-CM

## 2024-08-30 DIAGNOSIS — Z79.02 LONG TERM (CURRENT) USE OF ANTITHROMBOTICS/ANTIPLATELETS: ICD-10-CM

## 2024-08-30 DIAGNOSIS — G89.4 CHRONIC PAIN SYNDROME: ICD-10-CM

## 2024-08-30 DIAGNOSIS — D64.9 ANEMIA, UNSPECIFIED: ICD-10-CM

## 2024-08-30 DIAGNOSIS — Z79.82 LONG TERM (CURRENT) USE OF ASPIRIN: ICD-10-CM

## 2024-08-30 DIAGNOSIS — M10.9 GOUT, UNSPECIFIED: ICD-10-CM

## 2024-08-30 DIAGNOSIS — E78.5 HYPERLIPIDEMIA, UNSPECIFIED: ICD-10-CM

## 2024-08-30 DIAGNOSIS — J84.9 INTERSTITIAL PULMONARY DISEASE, UNSPECIFIED: ICD-10-CM

## 2024-08-30 DIAGNOSIS — I87.2 VENOUS INSUFFICIENCY (CHRONIC) (PERIPHERAL): ICD-10-CM

## 2024-08-30 DIAGNOSIS — S32.018A OTHER FRACTURE OF FIRST LUMBAR VERTEBRA, INITIAL ENCOUNTER FOR CLOSED FRACTURE: ICD-10-CM

## 2024-08-30 DIAGNOSIS — G47.00 INSOMNIA, UNSPECIFIED: ICD-10-CM

## 2024-08-30 DIAGNOSIS — F41.9 ANXIETY DISORDER, UNSPECIFIED: ICD-10-CM

## 2024-08-30 DIAGNOSIS — Z79.01 LONG TERM (CURRENT) USE OF ANTICOAGULANTS: ICD-10-CM

## 2024-08-30 DIAGNOSIS — I25.10 ATHEROSCLEROTIC HEART DISEASE OF NATIVE CORONARY ARTERY WITHOUT ANGINA PECTORIS: ICD-10-CM

## 2024-08-30 DIAGNOSIS — Z86.718 PERSONAL HISTORY OF OTHER VENOUS THROMBOSIS AND EMBOLISM: ICD-10-CM

## 2024-08-30 DIAGNOSIS — N35.919 UNSPECIFIED URETHRAL STRICTURE, MALE, UNSPECIFIED SITE: ICD-10-CM

## 2024-08-30 DIAGNOSIS — N40.1 BENIGN PROSTATIC HYPERPLASIA WITH LOWER URINARY TRACT SYMPTOMS: ICD-10-CM
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
## 2024-08-30 DIAGNOSIS — R33.8 OTHER RETENTION OF URINE: ICD-10-CM

## 2024-08-30 DIAGNOSIS — R00.1 BRADYCARDIA, UNSPECIFIED: ICD-10-CM

## 2024-08-30 DIAGNOSIS — I44.0 ATRIOVENTRICULAR BLOCK, FIRST DEGREE: ICD-10-CM

## 2024-08-30 DIAGNOSIS — Z91.040 LATEX ALLERGY STATUS: ICD-10-CM

## 2024-08-30 DIAGNOSIS — Z95.5 PRESENCE OF CORONARY ANGIOPLASTY IMPLANT AND GRAFT: ICD-10-CM
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
## 2024-08-30 DIAGNOSIS — Z79.891 LONG TERM (CURRENT) USE OF OPIATE ANALGESIC: ICD-10-CM

## 2024-08-30 DIAGNOSIS — E11.40 TYPE 2 DIABETES MELLITUS WITH DIABETIC NEUROPATHY, UNSPECIFIED: ICD-10-CM

## 2024-08-30 DIAGNOSIS — Z86.711 PERSONAL HISTORY OF PULMONARY EMBOLISM: ICD-10-CM

## 2024-08-30 DIAGNOSIS — K59.00 CONSTIPATION, UNSPECIFIED: ICD-10-CM

## 2024-08-30 DIAGNOSIS — I12.9 HYPERTENSIVE CHRONIC KIDNEY DISEASE WITH STAGE 1 THROUGH STAGE 4 CHRONIC KIDNEY DISEASE, OR UNSPECIFIED CHRONIC KIDNEY DISEASE: ICD-10-CM

## 2024-08-30 DIAGNOSIS — R68.0 HYPOTHERMIA, NOT ASSOCIATED WITH LOW ENVIRONMENTAL TEMPERATURE: ICD-10-CM

## 2024-08-30 DIAGNOSIS — D68.61 ANTIPHOSPHOLIPID SYNDROME: ICD-10-CM

## 2024-08-30 DIAGNOSIS — I95.9 HYPOTENSION, UNSPECIFIED: ICD-10-CM

## 2024-09-06 ENCOUNTER — APPOINTMENT (OUTPATIENT)
Dept: SPINE | Facility: CLINIC | Age: 84
End: 2024-09-06
Payer: MEDICARE

## 2024-09-06 ENCOUNTER — APPOINTMENT (OUTPATIENT)
Dept: NEPHROLOGY | Facility: CLINIC | Age: 84
End: 2024-09-06
Payer: MEDICARE

## 2024-09-06 VITALS
OXYGEN SATURATION: 96 % | HEART RATE: 63 BPM | HEIGHT: 68 IN | BODY MASS INDEX: 22.58 KG/M2 | SYSTOLIC BLOOD PRESSURE: 117 MMHG | TEMPERATURE: 97.7 F | RESPIRATION RATE: 12 BRPM | WEIGHT: 149 LBS | DIASTOLIC BLOOD PRESSURE: 60 MMHG

## 2024-09-06 VITALS
SYSTOLIC BLOOD PRESSURE: 117 MMHG | WEIGHT: 149 LBS | BODY MASS INDEX: 22.66 KG/M2 | DIASTOLIC BLOOD PRESSURE: 60 MMHG | RESPIRATION RATE: 12 BRPM | HEART RATE: 63 BPM

## 2024-09-06 DIAGNOSIS — R63.4 ABNORMAL WEIGHT LOSS: ICD-10-CM

## 2024-09-06 DIAGNOSIS — R39.9 UNSPECIFIED SYMPTOMS AND SIGNS INVOLVING THE GENITOURINARY SYSTEM: ICD-10-CM

## 2024-09-06 DIAGNOSIS — N18.9 CHRONIC KIDNEY DISEASE, UNSPECIFIED: ICD-10-CM

## 2024-09-06 DIAGNOSIS — R60.0 LOCALIZED EDEMA: ICD-10-CM

## 2024-09-06 DIAGNOSIS — N18.9 ACUTE KIDNEY FAILURE, UNSPECIFIED: ICD-10-CM

## 2024-09-06 DIAGNOSIS — Z98.1 ARTHRODESIS STATUS: ICD-10-CM

## 2024-09-06 DIAGNOSIS — J84.9 INTERSTITIAL PULMONARY DISEASE, UNSPECIFIED: ICD-10-CM

## 2024-09-06 DIAGNOSIS — N17.9 ACUTE KIDNEY FAILURE, UNSPECIFIED: ICD-10-CM

## 2024-09-06 DIAGNOSIS — I82.409 ACUTE EMBOLISM AND THROMBOSIS OF UNSPECIFIED DEEP VEINS OF UNSPECIFIED LOWER EXTREMITY: ICD-10-CM

## 2024-09-06 PROCEDURE — G2211 COMPLEX E/M VISIT ADD ON: CPT

## 2024-09-06 PROCEDURE — 99215 OFFICE O/P EST HI 40 MIN: CPT

## 2024-09-06 PROCEDURE — 99024 POSTOP FOLLOW-UP VISIT: CPT

## 2024-09-06 NOTE — ASSESSMENT
[FreeTextEntry1] : Plan: No Bend/Lift/Twist Do NOT smoke or use nicotine products as it can prevent bony fusion and delay healing Shower daily; use mild soap - pat incision line dry with dry separate towel Do not apply lotions or ointments over incision No tubs, pools for 6-8 weeks Avoid direct sun exposure to incision site for 3-6 months Gradually increase activities as tolerated   Report all s/s infection including drainage, odor, redness, fever greater than 101  Follow up at 6 weeks post op with Dr. Hernandez for XRAYS to be completed at that time.

## 2024-09-06 NOTE — PHYSICAL EXAM
[Longitudinal] : longitudinal [General Appearance - Alert] : alert [Clean] : clean [Dry] : dry [Healing Well] : healing well [No Drainage] : without drainage [Oriented To Time, Place, And Person] : oriented to person, place, and time [Person] : oriented to person [Place] : oriented to place [Time] : oriented to time

## 2024-09-06 NOTE — REASON FOR VISIT
[de-identified] : Open reduction and internal fixation of L1 fracture. Posterior releasing osteotomy, T12-L1. Exploration of previous posterior spinal fusion L1. Posterior spinal fusion T10-T11, T11-T12, T12-L1, L1-L2. Posterior segmental instrumentation T10 to L2. Vertebral augmentation with methylmethacrylate cement, T10 and T11. Use of local autograft bone and allograft bone to facilitate fusion. [de-identified] : 08/12/2024

## 2024-09-06 NOTE — HISTORY OF PRESENT ILLNESS
[> 3 months] : more  than 3 months [FreeTextEntry1] : lower back pain [de-identified] : 85 y/o male with hx  HTN, pre- T2DM, CAD s/p stent~ 4 yrs ago (on Plavix), L1-S1 fusion in 2020 @[facility].  Today coming in for lower back pain radiating to right buttocks. He denies pain radiating to the lower extremities. Currently he can walk for approximately 1 block until the pain becomes debilitating and he has to stop. He also finds himself hunched over when ambulating. 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  He denies any urinary/bowel dysfunction, saddle anesthesia.  Patient currently followed by pain management Dr. Powell, phone: [phone]  Surgery 08/12/2024 T11-L3 revision and fusion  Today 09/06/2024 patient is doing well. Patient is reporting some lower back pain, followed by pain management. Incision is healing well with no drainage, swelling, or redness noted. He is currently working with PT @ [facility] on balance and gait improvement.

## 2024-09-09 NOTE — PHYSICAL EXAM
[General Appearance - Alert] : alert [General Appearance - In No Acute Distress] : in no acute distress [PERRL With Normal Accommodation] : pupils were equal in size, round, and reactive to light [Sclera] : the sclera and conjunctiva were normal [Extraocular Movements] : extraocular movements were intact [Outer Ear] : the ears and nose were normal in appearance [Oropharynx] : the oropharynx was normal [Neck Appearance] : the appearance of the neck was normal [Neck Cervical Mass (___cm)] : no neck mass was observed [Jugular Venous Distention Increased] : there was no jugular-venous distention [Respiration, Rhythm And Depth] : normal respiratory rhythm and effort [Exaggerated Use Of Accessory Muscles For Inspiration] : no accessory muscle use [Auscultation Breath Sounds / Voice Sounds] : lungs were clear to auscultation bilaterally [Heart Rate And Rhythm] : heart rate was normal and rhythm regular [Heart Sounds] : normal S1 and S2 [Heart Sounds Gallop] : no gallops [Murmurs] : no murmurs [Heart Sounds Pericardial Friction Rub] : no pericardial rub [Edema] : there was no peripheral edema [Veins - Varicosity Changes] : there were no varicosital changes [Bowel Sounds] : normal bowel sounds [Abdomen Soft] : soft [Abdomen Tenderness] : non-tender [Abdomen Mass (___ Cm)] : no abdominal mass palpated [No CVA Tenderness] : no ~M costovertebral angle tenderness [No Spinal Tenderness] : no spinal tenderness [Abnormal Walk] : normal gait [Involuntary Movements] : no involuntary movements were seen [Skin Color & Pigmentation] : normal skin color and pigmentation [Skin Turgor] : normal skin turgor [] : no rash [Cranial Nerves] : cranial nerves 2-12 were intact [No Focal Deficits] : no focal deficits [Affect] : the affect was normal [Mood] : the mood was normal

## 2024-09-09 NOTE — ASSESSMENT
[FreeTextEntry1] : Patient is an 83 yo M with HTN, BPH, hx of nephrolithiasis, who presents for evaluation of CKD, weight loss, and LE edema  Weight loss - last colonoscopy a few years ago, negative, aged out, no symptoms. 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

## 2024-09-09 NOTE — HISTORY OF PRESENT ILLNESS
[FreeTextEntry1] : Patient is an 83 yo M with HTN, BPH, hx of nephrolithiasis, who presents for evaluation of CKD, weight loss, and LE edema  CKD stable on last labs, weight loss stable since surgery now gaining again. , LE edema stable assess by vascular will wrap and elevate legs. CT scan with ILD, will refer to pulm Heme onc referral for +cardiolipin The patient reports a major rocovery since his last visit. He described the recovery as challenging due to constant pain. His sleep has since improved following medication adjustments by his pain management specialist. LE edema completely resolved and down to 20mg lasix  Medications : Aspirin 81 mg, Furosemide 20 mg, Methocarbonyl 50 mg three times a day, Allopurinol 100 mg, Finasteride 5 mg, Atorvastatin 80 mg, Eliquis 2.5 mg twice a day, Multivitamins and Buprenorphine and naloxone sublingual 2 mg/0.5 mg

## 2024-09-09 NOTE — ASSESSMENT
[FreeTextEntry1] : Patient is an 85 yo M with HTN, BPH, hx of nephrolithiasis, who presents for evaluation of CKD, weight loss, and LE edema  Weight loss - last colonoscopy a few years ago, negative, aged out, no symptoms. 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

## 2024-09-11 RX ORDER — METHOCARBAMOL 500 MG/1
500 TABLET, FILM COATED ORAL 3 TIMES DAILY
Qty: 90 | Refills: 2 | Status: DISCONTINUED | COMMUNITY
Start: 2024-09-06 | End: 2024-09-11

## 2024-09-11 RX ORDER — METHOCARBAMOL 500 MG/1
500 TABLET, FILM COATED ORAL 3 TIMES DAILY
Qty: 90 | Refills: 2 | Status: ACTIVE | COMMUNITY
Start: 2024-09-11 | End: 1900-01-01

## 2024-09-13 NOTE — PHYSICAL EXAM
[General Appearance - Alert] : alert [Longitudinal] : longitudinal [Clean] : clean [Dry] : dry [Healing Well] : healing well [No Drainage] : without drainage [Oriented To Time, Place, And Person] : oriented to person, place, and time [Person] : oriented to person [Place] : oriented to place [Time] : oriented to time [] : no respiratory distress

## 2024-09-19 ENCOUNTER — OUTPATIENT (OUTPATIENT)
Dept: OUTPATIENT SERVICES | Facility: HOSPITAL | Age: 84
LOS: 1 days | End: 2024-09-19
Payer: MEDICARE

## 2024-09-19 ENCOUNTER — APPOINTMENT (OUTPATIENT)
Dept: SPINE | Facility: CLINIC | Age: 84
End: 2024-09-19
Payer: MEDICARE

## 2024-09-19 VITALS
BODY MASS INDEX: 22.22 KG/M2 | HEIGHT: 69 IN | HEART RATE: 82 BPM | SYSTOLIC BLOOD PRESSURE: 121 MMHG | TEMPERATURE: 97.6 F | RESPIRATION RATE: 18 BRPM | WEIGHT: 150 LBS | OXYGEN SATURATION: 98 % | DIASTOLIC BLOOD PRESSURE: 72 MMHG

## 2024-09-19 DIAGNOSIS — Z95.5 PRESENCE OF CORONARY ANGIOPLASTY IMPLANT AND GRAFT: Chronic | ICD-10-CM

## 2024-09-19 DIAGNOSIS — Z98.89 OTHER SPECIFIED POSTPROCEDURAL STATES: Chronic | ICD-10-CM

## 2024-09-19 DIAGNOSIS — Z96.659 PRESENCE OF UNSPECIFIED ARTIFICIAL KNEE JOINT: Chronic | ICD-10-CM

## 2024-09-19 DIAGNOSIS — Z98.1 ARTHRODESIS STATUS: ICD-10-CM

## 2024-09-19 DIAGNOSIS — Z98.890 OTHER SPECIFIED POSTPROCEDURAL STATES: Chronic | ICD-10-CM

## 2024-09-19 PROCEDURE — 72082 X-RAY EXAM ENTIRE SPI 2/3 VW: CPT | Mod: 26

## 2024-09-19 PROCEDURE — 72082 X-RAY EXAM ENTIRE SPI 2/3 VW: CPT

## 2024-09-19 PROCEDURE — 99024 POSTOP FOLLOW-UP VISIT: CPT

## 2024-09-20 NOTE — REASON FOR VISIT
[de-identified] : Open reduction and internal fixation of L1 fracture. Posterior releasing osteotomy, T12-L1. Exploration of previous posterior spinal fusion L1. Posterior spinal fusion T10-T11, T11-T12, T12-L1, L1-L2. Posterior segmental instrumentation T10 to L2. Vertebral augmentation with methylmethacrylate cement, T10 and T11. Use of local autograft bone and allograft bone to facilitate fusion. [de-identified] : 08/12/2024

## 2024-09-20 NOTE — HISTORY OF PRESENT ILLNESS
[> 3 months] : more  than 3 months [FreeTextEntry1] : lower back pain [de-identified] : 83 y/o male with hx  HTN, pre- T2DM, CAD s/p stent~ 4 yrs ago (on Plavix), L1-S1 fusion in 2020 @[facility].  Today coming in for lower back pain radiating to right buttocks. He denies pain radiating to the lower extremities. Currently he can walk for approximately 1 block until the pain becomes debilitating and he has to stop. He also finds himself hunched over when ambulating. 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  He denies any urinary/bowel dysfunction, saddle anesthesia.  Patient currently followed by pain management Dr. Powell, phone: [phone]  Surgery 08/12/2024 T11-L3 revision and fusion  Office Visit 09/06/2024 patient is doing well. Patient is reporting some lower back pain, followed by pain management. Incision is healing well with no drainage, swelling, or redness noted. He is currently working with PT @ [facility] on balance and gait improvement.   Today 09/19/2024 patient is doing well. States lower back pain has been improving. He is currently working with PT at [facility]. XRAYS completed today show hardware intact.

## 2024-09-20 NOTE — ADDENDUM
[FreeTextEntry1] : The patient is an 84-year-old gentleman who is approximately 6 weeks status post revision T10-L2 fusion.  The patient states that he is doing very well.  His pain has significantly improved from prior to surgery.  He has just begun physical therapy and feels he is making progress.  The patient has x-rays of his spine available for review today that show anatomic alignment of the spine without evidence of instrumentation failure.  At this point, the patient is doing very well.  I recommend that he continue with his physical therapy.  He should return to the office in 6 weeks for routine follow-up.

## 2024-09-20 NOTE — HISTORY OF PRESENT ILLNESS
[> 3 months] : more  than 3 months [FreeTextEntry1] : lower back pain [de-identified] : 85 y/o male with hx  HTN, pre- T2DM, CAD s/p stent~ 4 yrs ago (on Plavix), L1-S1 fusion in 2020 @[facility].  Today coming in for lower back pain radiating to right buttocks. He denies pain radiating to the lower extremities. Currently he can walk for approximately 1 block until the pain becomes debilitating and he has to stop. He also finds himself hunched over when ambulating. 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  He denies any urinary/bowel dysfunction, saddle anesthesia.  Patient currently followed by pain management Dr. Powell, phone: [phone]  Surgery 08/12/2024 T11-L3 revision and fusion  Office Visit 09/06/2024 patient is doing well. Patient is reporting some lower back pain, followed by pain management. Incision is healing well with no drainage, swelling, or redness noted. He is currently working with PT @ [email or web address] on balance and gait improvement.   Today 09/19/2024 patient is doing well. States lower back pain has been improving. He is currently working with PT at [email or web address] XRAYS completed today show hardware intact.

## 2024-10-16 RX ORDER — METHOCARBAMOL 500 MG/1
500 TABLET, FILM COATED ORAL 3 TIMES DAILY
Qty: 90 | Refills: 2 | Status: ACTIVE | COMMUNITY
Start: 2024-10-16 | End: 1900-01-01

## 2024-10-28 NOTE — BH CONSULTATION LIAISON ASSESSMENT NOTE - ACCESS TO FIREARM
[id number]    Pt A&OX4, VSS on RA, up x1 w PT/OT & walker/cane.    No c/o n/v/d. PRN 6mg Morphine IV given prior dressing change. PRN PO Morphine given after OT & PT sessions.    R Port c/d/I, flushes well w/good blood return noted. Caps changed    L PIV removed for infiltration.    Periwound dressing changed as ordered, pt tolerated well.  took image of the wound.    Pt worked w/Pt & OT. Pt was overstimulated w/ sessions & dressing change. Pt got emotional. Emotional support provided & care cont'd on.    Pt was mostly tired early to mid day due to restless night.    Meds given per MAR.   Unable to assess

## 2024-11-11 ENCOUNTER — NON-APPOINTMENT (OUTPATIENT)
Age: 84
End: 2024-11-11

## 2024-11-14 NOTE — DISCHARGE NOTE NURSING/CASE MANAGEMENT/SOCIAL WORK - NSCORESITESY/N_GEN_A_CORE_RD
Quality 226: Preventive Care And Screening: Tobacco Use: Screening And Cessation Intervention: Patient screened for tobacco use and is an ex/non-smoker Detail Level: Detailed Quality 130: Documentation Of Current Medications In The Medical Record: Current Medications Documented No Quality 431: Preventive Care And Screening: Unhealthy Alcohol Use - Screening: Patient not identified as an unhealthy alcohol user when screened for unhealthy alcohol use using a systematic screening method

## 2024-11-15 ENCOUNTER — APPOINTMENT (OUTPATIENT)
Dept: NEPHROLOGY | Facility: CLINIC | Age: 84
End: 2024-11-15

## 2024-11-15 VITALS
RESPIRATION RATE: 16 BRPM | SYSTOLIC BLOOD PRESSURE: 120 MMHG | DIASTOLIC BLOOD PRESSURE: 70 MMHG | HEART RATE: 90 BPM | OXYGEN SATURATION: 94 %

## 2024-11-15 DIAGNOSIS — N18.9 ACUTE KIDNEY FAILURE, UNSPECIFIED: ICD-10-CM

## 2024-11-15 DIAGNOSIS — I82.409 ACUTE EMBOLISM AND THROMBOSIS OF UNSPECIFIED DEEP VEINS OF UNSPECIFIED LOWER EXTREMITY: ICD-10-CM

## 2024-11-15 DIAGNOSIS — N17.9 ACUTE KIDNEY FAILURE, UNSPECIFIED: ICD-10-CM

## 2024-11-15 DIAGNOSIS — I10 ESSENTIAL (PRIMARY) HYPERTENSION: ICD-10-CM

## 2024-11-15 DIAGNOSIS — R39.9 UNSPECIFIED SYMPTOMS AND SIGNS INVOLVING THE GENITOURINARY SYSTEM: ICD-10-CM

## 2024-11-15 PROCEDURE — G2211 COMPLEX E/M VISIT ADD ON: CPT

## 2024-11-15 PROCEDURE — 99215 OFFICE O/P EST HI 40 MIN: CPT

## 2024-11-15 PROCEDURE — 93308 TTE F-UP OR LMTD: CPT | Mod: 59

## 2024-11-21 ENCOUNTER — APPOINTMENT (OUTPATIENT)
Dept: INFUSION THERAPY | Facility: CLINIC | Age: 84
End: 2024-11-21

## 2024-11-21 ENCOUNTER — OUTPATIENT (OUTPATIENT)
Dept: OUTPATIENT SERVICES | Facility: HOSPITAL | Age: 84
LOS: 1 days | End: 2024-11-21

## 2024-11-21 DIAGNOSIS — Z98.89 OTHER SPECIFIED POSTPROCEDURAL STATES: Chronic | ICD-10-CM

## 2024-11-21 DIAGNOSIS — Z98.890 OTHER SPECIFIED POSTPROCEDURAL STATES: Chronic | ICD-10-CM

## 2024-11-21 DIAGNOSIS — Z95.5 PRESENCE OF CORONARY ANGIOPLASTY IMPLANT AND GRAFT: Chronic | ICD-10-CM

## 2024-11-21 DIAGNOSIS — D50.9 IRON DEFICIENCY ANEMIA, UNSPECIFIED: ICD-10-CM

## 2024-11-21 DIAGNOSIS — Z96.659 PRESENCE OF UNSPECIFIED ARTIFICIAL KNEE JOINT: Chronic | ICD-10-CM

## 2024-11-22 VITALS — SYSTOLIC BLOOD PRESSURE: 125 MMHG | RESPIRATION RATE: 16 BRPM | DIASTOLIC BLOOD PRESSURE: 75 MMHG | HEART RATE: 88 BPM

## 2024-11-27 ENCOUNTER — APPOINTMENT (OUTPATIENT)
Dept: SPINE | Facility: CLINIC | Age: 84
End: 2024-11-27
Payer: MEDICARE

## 2024-11-27 DIAGNOSIS — Z98.1 ARTHRODESIS STATUS: ICD-10-CM

## 2024-11-27 PROCEDURE — 99212 OFFICE O/P EST SF 10 MIN: CPT

## 2024-11-29 ENCOUNTER — APPOINTMENT (OUTPATIENT)
Dept: INFUSION THERAPY | Facility: CLINIC | Age: 84
End: 2024-11-29

## 2024-12-06 ENCOUNTER — LABORATORY RESULT (OUTPATIENT)
Age: 84
End: 2024-12-06

## 2024-12-06 ENCOUNTER — APPOINTMENT (OUTPATIENT)
Dept: NEPHROLOGY | Facility: CLINIC | Age: 84
End: 2024-12-06
Payer: MEDICARE

## 2024-12-06 DIAGNOSIS — N18.9 ACUTE KIDNEY FAILURE, UNSPECIFIED: ICD-10-CM

## 2024-12-06 DIAGNOSIS — R33.9 RETENTION OF URINE, UNSPECIFIED: ICD-10-CM

## 2024-12-06 DIAGNOSIS — R60.0 LOCALIZED EDEMA: ICD-10-CM

## 2024-12-06 DIAGNOSIS — N18.9 CHRONIC KIDNEY DISEASE, UNSPECIFIED: ICD-10-CM

## 2024-12-06 DIAGNOSIS — J84.9 INTERSTITIAL PULMONARY DISEASE, UNSPECIFIED: ICD-10-CM

## 2024-12-06 DIAGNOSIS — R39.9 UNSPECIFIED SYMPTOMS AND SIGNS INVOLVING THE GENITOURINARY SYSTEM: ICD-10-CM

## 2024-12-06 DIAGNOSIS — N17.9 ACUTE KIDNEY FAILURE, UNSPECIFIED: ICD-10-CM

## 2024-12-06 DIAGNOSIS — I10 ESSENTIAL (PRIMARY) HYPERTENSION: ICD-10-CM

## 2024-12-06 PROCEDURE — 99215 OFFICE O/P EST HI 40 MIN: CPT

## 2024-12-06 PROCEDURE — G2211 COMPLEX E/M VISIT ADD ON: CPT

## 2024-12-09 ENCOUNTER — NON-APPOINTMENT (OUTPATIENT)
Age: 84
End: 2024-12-09

## 2024-12-09 ENCOUNTER — APPOINTMENT (OUTPATIENT)
Dept: GASTROENTEROLOGY | Facility: CLINIC | Age: 84
End: 2024-12-09
Payer: MEDICARE

## 2024-12-09 VITALS
WEIGHT: 160 LBS | HEIGHT: 69 IN | DIASTOLIC BLOOD PRESSURE: 69 MMHG | TEMPERATURE: 97.7 F | HEART RATE: 88 BPM | BODY MASS INDEX: 23.7 KG/M2 | SYSTOLIC BLOOD PRESSURE: 139 MMHG

## 2024-12-09 DIAGNOSIS — R63.4 ABNORMAL WEIGHT LOSS: ICD-10-CM

## 2024-12-09 DIAGNOSIS — R74.8 ABNORMAL LEVELS OF OTHER SERUM ENZYMES: ICD-10-CM

## 2024-12-09 DIAGNOSIS — D64.9 ANEMIA, UNSPECIFIED: ICD-10-CM

## 2024-12-09 DIAGNOSIS — K86.89 OTHER SPECIFIED DISEASES OF PANCREAS: ICD-10-CM

## 2024-12-09 PROCEDURE — 99205 OFFICE O/P NEW HI 60 MIN: CPT

## 2024-12-09 PROCEDURE — G2211 COMPLEX E/M VISIT ADD ON: CPT

## 2024-12-10 ENCOUNTER — NON-APPOINTMENT (OUTPATIENT)
Age: 84
End: 2024-12-10

## 2024-12-10 LAB
ALBUMIN SERPL ELPH-MCNC: 4 G/DL
ALP BLD-CCNC: 170 U/L
ALT SERPL-CCNC: 16 U/L
ANION GAP SERPL CALC-SCNC: 13 MMOL/L
APPEARANCE: CLEAR
AST SERPL-CCNC: 22 U/L
BASOPHILS # BLD AUTO: 0.05 K/UL
BASOPHILS NFR BLD AUTO: 0.6 %
BILIRUB SERPL-MCNC: 0.5 MG/DL
BILIRUBIN URINE: NEGATIVE
BLOOD URINE: NEGATIVE
BUN SERPL-MCNC: 46 MG/DL
C3 SERPL-MCNC: 140 MG/DL
C4 SERPL-MCNC: 32 MG/DL
CALCIUM SERPL-MCNC: 10.1 MG/DL
CALCIUM SERPL-MCNC: 10.1 MG/DL
CHLORIDE SERPL-SCNC: 105 MMOL/L
CO2 SERPL-SCNC: 23 MMOL/L
COLOR: YELLOW
CREAT SERPL-MCNC: 2.08 MG/DL
CREAT SPEC-SCNC: 41 MG/DL
CREAT/PROT UR: 0.3 RATIO
CYSTATIN C SERPL-MCNC: 2.48 MG/L
EGFR: 30 ML/MIN/1.73M2
EOSINOPHIL # BLD AUTO: 0.22 K/UL
EOSINOPHIL NFR BLD AUTO: 2.5 %
ESTIMATED AVERAGE GLUCOSE: 137 MG/DL
FERRITIN SERPL-MCNC: 970 NG/ML
GFR/BSA.PRED SERPLBLD CYS-BASED-ARV: 21 ML/MIN/1.73M2
GLUCOSE QUALITATIVE U: NEGATIVE MG/DL
GLUCOSE SERPL-MCNC: 109 MG/DL
HBA1C MFR BLD HPLC: 6.4 %
HCT VFR BLD CALC: 34.3 %
HGB BLD-MCNC: 10.2 G/DL
IMM GRANULOCYTES NFR BLD AUTO: 0.3 %
IRON SATN MFR SERPL: 41 %
IRON SERPL-MCNC: 120 UG/DL
KETONES URINE: NEGATIVE MG/DL
LEUKOCYTE ESTERASE URINE: NEGATIVE
LYMPHOCYTES # BLD AUTO: 1.2 K/UL
LYMPHOCYTES NFR BLD AUTO: 13.5 %
MAN DIFF?: NORMAL
MCHC RBC-ENTMCNC: 24.9 PG
MCHC RBC-ENTMCNC: 29.7 G/DL
MCV RBC AUTO: 83.7 FL
MONOCYTES # BLD AUTO: 0.87 K/UL
MONOCYTES NFR BLD AUTO: 9.8 %
NEUTROPHILS # BLD AUTO: 6.55 K/UL
NEUTROPHILS NFR BLD AUTO: 73.3 %
NITRITE URINE: NEGATIVE
PARATHYROID HORMONE INTACT: 139 PG/ML
PH URINE: 6
PHOSPHATE SERPL-MCNC: 3.2 MG/DL
PLATELET # BLD AUTO: 271 K/UL
POTASSIUM SERPL-SCNC: 4.1 MMOL/L
PROT SERPL-MCNC: 7.7 G/DL
PROT UR-MCNC: 12 MG/DL
PROTEIN URINE: NEGATIVE MG/DL
RBC # BLD: 4.1 M/UL
RBC # FLD: 22.8 %
SODIUM ?TM SUB UR QN: 89 MMOL/L
SODIUM SERPL-SCNC: 140 MMOL/L
SPECIFIC GRAVITY URINE: 1.01
TIBC SERPL-MCNC: 292 UG/DL
UIBC SERPL-MCNC: 172 UG/DL
UROBILINOGEN URINE: 0.2 MG/DL
WBC # FLD AUTO: 8.92 K/UL

## 2024-12-11 ENCOUNTER — NON-APPOINTMENT (OUTPATIENT)
Age: 84
End: 2024-12-11

## 2024-12-11 LAB
CANCER AG19-9 SERPL-ACNC: 14 U/ML
GGT SERPL-CCNC: 20 U/L
MITOCHONDRIA AB SER IF-ACNC: NORMAL

## 2024-12-12 VITALS — RESPIRATION RATE: 12 BRPM | HEART RATE: 80 BPM | SYSTOLIC BLOOD PRESSURE: 135 MMHG | DIASTOLIC BLOOD PRESSURE: 80 MMHG

## 2024-12-12 LAB
ALBUMIN MFR SERPL ELPH: 48.7 %
ALBUMIN SERPL-MCNC: 3.7 G/DL
ALBUMIN/GLOB SERPL: 0.9 RATIO
ALPHA1 GLOB MFR SERPL ELPH: 6.1 %
ALPHA1 GLOB SERPL ELPH-MCNC: 0.5 G/DL
ALPHA2 GLOB MFR SERPL ELPH: 12.3 %
ALPHA2 GLOB SERPL ELPH-MCNC: 0.9 G/DL
B-GLOBULIN MFR SERPL ELPH: 13.8 %
B-GLOBULIN SERPL ELPH-MCNC: 1.1 G/DL
DEPRECATED KAPPA LC FREE/LAMBDA SER: 1.63 RATIO
GAMMA GLOB FLD ELPH-MCNC: 1.5 G/DL
GAMMA GLOB MFR SERPL ELPH: 19.1 %
IGA SER QL IEP: 563 MG/DL
IGG SER QL IEP: 1532 MG/DL
IGM SER QL IEP: 80 MG/DL
INTERPRETATION SERPL IEP-IMP: NORMAL
KAPPA LC CSF-MCNC: 6.54 MG/DL
KAPPA LC SERPL-MCNC: 10.64 MG/DL
M PROTEIN SPEC IFE-MCNC: NORMAL
PROT SERPL-MCNC: 7.7 G/DL
PROT SERPL-MCNC: 7.7 G/DL

## 2025-01-10 ENCOUNTER — NON-APPOINTMENT (OUTPATIENT)
Age: 85
End: 2025-01-10

## 2025-01-10 DIAGNOSIS — M54.9 DORSALGIA, UNSPECIFIED: ICD-10-CM

## 2025-01-15 ENCOUNTER — APPOINTMENT (OUTPATIENT)
Dept: CT IMAGING | Facility: CLINIC | Age: 85
End: 2025-01-15

## 2025-01-15 ENCOUNTER — APPOINTMENT (OUTPATIENT)
Dept: MRI IMAGING | Facility: CLINIC | Age: 85
End: 2025-01-15

## 2025-01-15 ENCOUNTER — EMERGENCY (EMERGENCY)
Age: 85
LOS: 1 days | Discharge: HOME | End: 2025-01-15
Attending: EMERGENCY MEDICINE | Admitting: EMERGENCY MEDICINE
Payer: MEDICARE

## 2025-01-15 VITALS
DIASTOLIC BLOOD PRESSURE: 84 MMHG | OXYGEN SATURATION: 99 % | SYSTOLIC BLOOD PRESSURE: 157 MMHG | RESPIRATION RATE: 14 BRPM | TEMPERATURE: 98 F | HEART RATE: 92 BPM

## 2025-01-15 DIAGNOSIS — Z95.5 PRESENCE OF CORONARY ANGIOPLASTY IMPLANT AND GRAFT: ICD-10-CM

## 2025-01-15 DIAGNOSIS — T88.7XXA UNSPECIFIED ADVERSE EFFECT OF DRUG OR MEDICAMENT, INITIAL ENCOUNTER: ICD-10-CM

## 2025-01-15 DIAGNOSIS — N40.0 BENIGN PROSTATIC HYPERPLASIA WITHOUT LOWER URINARY TRACT SYMPTOMS: ICD-10-CM

## 2025-01-15 DIAGNOSIS — Z96.659 PRESENCE OF UNSPECIFIED ARTIFICIAL KNEE JOINT: Chronic | ICD-10-CM

## 2025-01-15 DIAGNOSIS — I25.10 ATHEROSCLEROTIC HEART DISEASE OF NATIVE CORONARY ARTERY WITHOUT ANGINA PECTORIS: ICD-10-CM

## 2025-01-15 DIAGNOSIS — Z91.040 LATEX ALLERGY STATUS: ICD-10-CM

## 2025-01-15 DIAGNOSIS — Z95.5 PRESENCE OF CORONARY ANGIOPLASTY IMPLANT AND GRAFT: Chronic | ICD-10-CM

## 2025-01-15 DIAGNOSIS — Z98.89 OTHER SPECIFIED POSTPROCEDURAL STATES: Chronic | ICD-10-CM

## 2025-01-15 DIAGNOSIS — R52 PAIN, UNSPECIFIED: ICD-10-CM

## 2025-01-15 DIAGNOSIS — N18.9 CHRONIC KIDNEY DISEASE, UNSPECIFIED: ICD-10-CM

## 2025-01-15 DIAGNOSIS — E11.22 TYPE 2 DIABETES MELLITUS WITH DIABETIC CHRONIC KIDNEY DISEASE: ICD-10-CM

## 2025-01-15 PROCEDURE — 99283 EMERGENCY DEPT VISIT LOW MDM: CPT

## 2025-01-16 ENCOUNTER — EMERGENCY (EMERGENCY)
Facility: HOSPITAL | Age: 85
LOS: 1 days | Discharge: HOME | End: 2025-01-16
Attending: EMERGENCY MEDICINE | Admitting: EMERGENCY MEDICINE
Payer: MEDICARE

## 2025-01-16 ENCOUNTER — APPOINTMENT (OUTPATIENT)
Dept: NEPHROLOGY | Facility: CLINIC | Age: 85
End: 2025-01-16

## 2025-01-16 VITALS
DIASTOLIC BLOOD PRESSURE: 91 MMHG | OXYGEN SATURATION: 100 % | RESPIRATION RATE: 17 BRPM | HEART RATE: 90 BPM | SYSTOLIC BLOOD PRESSURE: 145 MMHG

## 2025-01-16 VITALS
WEIGHT: 158.07 LBS | DIASTOLIC BLOOD PRESSURE: 94 MMHG | TEMPERATURE: 97 F | SYSTOLIC BLOOD PRESSURE: 154 MMHG | RESPIRATION RATE: 16 BRPM | HEART RATE: 96 BPM | HEIGHT: 69 IN | OXYGEN SATURATION: 100 %

## 2025-01-16 DIAGNOSIS — E78.5 HYPERLIPIDEMIA, UNSPECIFIED: ICD-10-CM

## 2025-01-16 DIAGNOSIS — Z98.890 OTHER SPECIFIED POSTPROCEDURAL STATES: Chronic | ICD-10-CM

## 2025-01-16 DIAGNOSIS — Z86.718 PERSONAL HISTORY OF OTHER VENOUS THROMBOSIS AND EMBOLISM: ICD-10-CM

## 2025-01-16 DIAGNOSIS — Z98.89 OTHER SPECIFIED POSTPROCEDURAL STATES: Chronic | ICD-10-CM

## 2025-01-16 DIAGNOSIS — I87.2 VENOUS INSUFFICIENCY (CHRONIC) (PERIPHERAL): ICD-10-CM

## 2025-01-16 DIAGNOSIS — Z96.659 PRESENCE OF UNSPECIFIED ARTIFICIAL KNEE JOINT: Chronic | ICD-10-CM

## 2025-01-16 DIAGNOSIS — Z91.040 LATEX ALLERGY STATUS: ICD-10-CM

## 2025-01-16 DIAGNOSIS — Z95.5 PRESENCE OF CORONARY ANGIOPLASTY IMPLANT AND GRAFT: Chronic | ICD-10-CM

## 2025-01-16 DIAGNOSIS — I12.9 HYPERTENSIVE CHRONIC KIDNEY DISEASE WITH STAGE 1 THROUGH STAGE 4 CHRONIC KIDNEY DISEASE, OR UNSPECIFIED CHRONIC KIDNEY DISEASE: ICD-10-CM

## 2025-01-16 LAB
ANION GAP SERPL CALC-SCNC: 13 MMOL/L — SIGNIFICANT CHANGE UP (ref 5–17)
BASOPHILS # BLD AUTO: 0 K/UL — SIGNIFICANT CHANGE UP (ref 0–0.2)
BASOPHILS NFR BLD AUTO: 0 % — SIGNIFICANT CHANGE UP (ref 0–2)
BUN SERPL-MCNC: 44 MG/DL — HIGH (ref 7–23)
CALCIUM SERPL-MCNC: 10.2 MG/DL — SIGNIFICANT CHANGE UP (ref 8.4–10.5)
CHLORIDE SERPL-SCNC: 101 MMOL/L — SIGNIFICANT CHANGE UP (ref 96–108)
CO2 SERPL-SCNC: 24 MMOL/L — SIGNIFICANT CHANGE UP (ref 22–31)
CREAT SERPL-MCNC: 1.98 MG/DL — HIGH (ref 0.5–1.3)
EGFR: 32 ML/MIN/1.73M2 — LOW
EGFR: 32 ML/MIN/1.73M2 — LOW
EOSINOPHIL # BLD AUTO: 0 K/UL — SIGNIFICANT CHANGE UP (ref 0–0.5)
EOSINOPHIL NFR BLD AUTO: 0 % — SIGNIFICANT CHANGE UP (ref 0–6)
GLUCOSE SERPL-MCNC: 117 MG/DL — HIGH (ref 70–99)
HCT VFR BLD CALC: 37.9 % — LOW (ref 39–50)
HGB BLD-MCNC: 11.8 G/DL — LOW (ref 13–17)
LYMPHOCYTES # BLD AUTO: 1.02 K/UL — SIGNIFICANT CHANGE UP (ref 1–3.3)
LYMPHOCYTES # BLD AUTO: 14.8 % — SIGNIFICANT CHANGE UP (ref 13–44)
MCHC RBC-ENTMCNC: 27.2 PG — SIGNIFICANT CHANGE UP (ref 27–34)
MCHC RBC-ENTMCNC: 31.1 G/DL — LOW (ref 32–36)
MCV RBC AUTO: 87.3 FL — SIGNIFICANT CHANGE UP (ref 80–100)
MONOCYTES # BLD AUTO: 0.36 K/UL — SIGNIFICANT CHANGE UP (ref 0–0.9)
MONOCYTES NFR BLD AUTO: 5.2 % — SIGNIFICANT CHANGE UP (ref 2–14)
NEUTROPHILS # BLD AUTO: 5.47 K/UL — SIGNIFICANT CHANGE UP (ref 1.8–7.4)
NEUTROPHILS NFR BLD AUTO: 79.1 % — HIGH (ref 43–77)
NRBC # BLD: SIGNIFICANT CHANGE UP /100 WBCS (ref 0–0)
NRBC BLD-RTO: SIGNIFICANT CHANGE UP /100 WBCS (ref 0–0)
PLATELET # BLD AUTO: 232 K/UL — SIGNIFICANT CHANGE UP (ref 150–400)
POTASSIUM SERPL-MCNC: 4.3 MMOL/L — SIGNIFICANT CHANGE UP (ref 3.5–5.3)
POTASSIUM SERPL-SCNC: 4.3 MMOL/L — SIGNIFICANT CHANGE UP (ref 3.5–5.3)
RBC # BLD: 4.34 M/UL — SIGNIFICANT CHANGE UP (ref 4.2–5.8)
RBC # FLD: 24.3 % — HIGH (ref 10.3–14.5)
SODIUM SERPL-SCNC: 138 MMOL/L — SIGNIFICANT CHANGE UP (ref 135–145)
WBC # BLD: 6.91 K/UL — SIGNIFICANT CHANGE UP (ref 3.8–10.5)
WBC # FLD AUTO: 6.91 K/UL — SIGNIFICANT CHANGE UP (ref 3.8–10.5)

## 2025-01-16 PROCEDURE — 99284 EMERGENCY DEPT VISIT MOD MDM: CPT

## 2025-01-16 PROCEDURE — 80048 BASIC METABOLIC PNL TOTAL CA: CPT

## 2025-01-16 PROCEDURE — 85025 COMPLETE CBC W/AUTO DIFF WBC: CPT

## 2025-01-16 PROCEDURE — 99283 EMERGENCY DEPT VISIT LOW MDM: CPT | Mod: 25

## 2025-01-16 PROCEDURE — 36415 COLL VENOUS BLD VENIPUNCTURE: CPT

## 2025-01-16 RX ORDER — CEPHALEXIN 250 MG/1
500 CAPSULE ORAL ONCE
Refills: 0 | Status: COMPLETED | OUTPATIENT
Start: 2025-01-16 | End: 2025-01-16

## 2025-01-16 RX ORDER — CEPHALEXIN 250 MG/1
1 CAPSULE ORAL
Qty: 21 | Refills: 0
Start: 2025-01-16 | End: 2025-01-22

## 2025-01-16 RX ADMIN — CEPHALEXIN 500 MILLIGRAM(S): 250 CAPSULE ORAL at 12:27

## 2025-01-19 ENCOUNTER — EMERGENCY (EMERGENCY)
Facility: HOSPITAL | Age: 85
LOS: 1 days | Discharge: HOME | End: 2025-01-19
Attending: EMERGENCY MEDICINE | Admitting: EMERGENCY MEDICINE
Payer: MEDICARE

## 2025-01-19 VITALS
DIASTOLIC BLOOD PRESSURE: 87 MMHG | SYSTOLIC BLOOD PRESSURE: 126 MMHG | TEMPERATURE: 98 F | HEART RATE: 82 BPM | RESPIRATION RATE: 18 BRPM | OXYGEN SATURATION: 99 %

## 2025-01-19 VITALS
OXYGEN SATURATION: 100 % | RESPIRATION RATE: 18 BRPM | DIASTOLIC BLOOD PRESSURE: 92 MMHG | SYSTOLIC BLOOD PRESSURE: 188 MMHG | TEMPERATURE: 98 F | WEIGHT: 158.07 LBS | HEIGHT: 69 IN | HEART RATE: 127 BPM

## 2025-01-19 DIAGNOSIS — Z95.5 PRESENCE OF CORONARY ANGIOPLASTY IMPLANT AND GRAFT: Chronic | ICD-10-CM

## 2025-01-19 DIAGNOSIS — Z98.89 OTHER SPECIFIED POSTPROCEDURAL STATES: Chronic | ICD-10-CM

## 2025-01-19 DIAGNOSIS — Z98.890 OTHER SPECIFIED POSTPROCEDURAL STATES: Chronic | ICD-10-CM

## 2025-01-19 DIAGNOSIS — Z96.659 PRESENCE OF UNSPECIFIED ARTIFICIAL KNEE JOINT: Chronic | ICD-10-CM

## 2025-01-19 LAB
ANION GAP SERPL CALC-SCNC: 15 MMOL/L — SIGNIFICANT CHANGE UP (ref 5–17)
APPEARANCE UR: CLEAR — SIGNIFICANT CHANGE UP
BASOPHILS # BLD AUTO: 0.05 K/UL — SIGNIFICANT CHANGE UP (ref 0–0.2)
BASOPHILS NFR BLD AUTO: 0.9 % — SIGNIFICANT CHANGE UP (ref 0–2)
BILIRUB UR-MCNC: NEGATIVE — SIGNIFICANT CHANGE UP
BUN SERPL-MCNC: 45 MG/DL — HIGH (ref 7–23)
CALCIUM SERPL-MCNC: 9.7 MG/DL — SIGNIFICANT CHANGE UP (ref 8.4–10.5)
CHLORIDE SERPL-SCNC: 102 MMOL/L — SIGNIFICANT CHANGE UP (ref 96–108)
CO2 SERPL-SCNC: 20 MMOL/L — LOW (ref 22–31)
COLOR SPEC: YELLOW — SIGNIFICANT CHANGE UP
CREAT SERPL-MCNC: 2.03 MG/DL — HIGH (ref 0.5–1.3)
DIFF PNL FLD: NEGATIVE — SIGNIFICANT CHANGE UP
EGFR: 31 ML/MIN/1.73M2 — LOW
EOSINOPHIL # BLD AUTO: 0 K/UL — SIGNIFICANT CHANGE UP (ref 0–0.5)
EOSINOPHIL NFR BLD AUTO: 0 % — SIGNIFICANT CHANGE UP (ref 0–6)
GLUCOSE SERPL-MCNC: 129 MG/DL — HIGH (ref 70–99)
GLUCOSE UR QL: NEGATIVE MG/DL — SIGNIFICANT CHANGE UP
HCT VFR BLD CALC: 38.8 % — LOW (ref 39–50)
HGB BLD-MCNC: 12.1 G/DL — LOW (ref 13–17)
KETONES UR-MCNC: ABNORMAL MG/DL
LEUKOCYTE ESTERASE UR-ACNC: NEGATIVE — SIGNIFICANT CHANGE UP
LYMPHOCYTES # BLD AUTO: 0.49 K/UL — LOW (ref 1–3.3)
LYMPHOCYTES # BLD AUTO: 9.7 % — LOW (ref 13–44)
MCHC RBC-ENTMCNC: 27.1 PG — SIGNIFICANT CHANGE UP (ref 27–34)
MCHC RBC-ENTMCNC: 31.2 G/DL — LOW (ref 32–36)
MCV RBC AUTO: 86.8 FL — SIGNIFICANT CHANGE UP (ref 80–100)
MONOCYTES # BLD AUTO: 0.27 K/UL — SIGNIFICANT CHANGE UP (ref 0–0.9)
MONOCYTES NFR BLD AUTO: 5.3 % — SIGNIFICANT CHANGE UP (ref 2–14)
NEUTROPHILS # BLD AUTO: 4.25 K/UL — SIGNIFICANT CHANGE UP (ref 1.8–7.4)
NEUTROPHILS NFR BLD AUTO: 84.1 % — HIGH (ref 43–77)
NITRITE UR-MCNC: NEGATIVE — SIGNIFICANT CHANGE UP
PH UR: 5.5 — SIGNIFICANT CHANGE UP (ref 5–8)
PLATELET # BLD AUTO: 219 K/UL — SIGNIFICANT CHANGE UP (ref 150–400)
POTASSIUM SERPL-MCNC: 4.2 MMOL/L — SIGNIFICANT CHANGE UP (ref 3.5–5.3)
POTASSIUM SERPL-SCNC: 4.2 MMOL/L — SIGNIFICANT CHANGE UP (ref 3.5–5.3)
PROT UR-MCNC: 100 MG/DL
RBC # BLD: 4.47 M/UL — SIGNIFICANT CHANGE UP (ref 4.2–5.8)
RBC # FLD: 24.2 % — HIGH (ref 10.3–14.5)
SODIUM SERPL-SCNC: 137 MMOL/L — SIGNIFICANT CHANGE UP (ref 135–145)
SP GR SPEC: 1.02 — SIGNIFICANT CHANGE UP (ref 1–1.03)
UROBILINOGEN FLD QL: 0.2 MG/DL — SIGNIFICANT CHANGE UP (ref 0.2–1)
WBC # BLD: 5.05 K/UL — SIGNIFICANT CHANGE UP (ref 3.8–10.5)
WBC # FLD AUTO: 5.05 K/UL — SIGNIFICANT CHANGE UP (ref 3.8–10.5)

## 2025-01-19 PROCEDURE — 81001 URINALYSIS AUTO W/SCOPE: CPT

## 2025-01-19 PROCEDURE — 96374 THER/PROPH/DIAG INJ IV PUSH: CPT

## 2025-01-19 PROCEDURE — 85025 COMPLETE CBC W/AUTO DIFF WBC: CPT

## 2025-01-19 PROCEDURE — 74176 CT ABD & PELVIS W/O CONTRAST: CPT | Mod: 26

## 2025-01-19 PROCEDURE — 93010 ELECTROCARDIOGRAM REPORT: CPT

## 2025-01-19 PROCEDURE — 93005 ELECTROCARDIOGRAM TRACING: CPT

## 2025-01-19 PROCEDURE — 99284 EMERGENCY DEPT VISIT MOD MDM: CPT

## 2025-01-19 PROCEDURE — 74176 CT ABD & PELVIS W/O CONTRAST: CPT | Mod: MC

## 2025-01-19 PROCEDURE — 99285 EMERGENCY DEPT VISIT HI MDM: CPT | Mod: 25

## 2025-01-19 PROCEDURE — 36415 COLL VENOUS BLD VENIPUNCTURE: CPT

## 2025-01-19 PROCEDURE — 80048 BASIC METABOLIC PNL TOTAL CA: CPT

## 2025-01-19 RX ORDER — ACETAMINOPHEN 80 MG/.8ML
1000 SOLUTION/ DROPS ORAL ONCE
Refills: 0 | Status: COMPLETED | OUTPATIENT
Start: 2025-01-19 | End: 2025-01-19

## 2025-01-19 RX ORDER — SODIUM CHLORIDE 9 MG/ML
500 INJECTION, SOLUTION INTRAMUSCULAR; INTRAVENOUS; SUBCUTANEOUS ONCE
Refills: 0 | Status: COMPLETED | OUTPATIENT
Start: 2025-01-19 | End: 2025-01-19

## 2025-01-19 RX ADMIN — SODIUM CHLORIDE 500 MILLILITER(S): 9 INJECTION, SOLUTION INTRAMUSCULAR; INTRAVENOUS; SUBCUTANEOUS at 16:25

## 2025-01-19 RX ADMIN — ACETAMINOPHEN 400 MILLIGRAM(S): 80 SOLUTION/ DROPS ORAL at 16:26

## 2025-01-19 NOTE — ED PROVIDER NOTE - NSFOLLOWUPINSTRUCTIONS_ED_ALL_ED_FT
Flank Pain, Adult  Flank pain is pain in your side. The flank is the area on your side between your upper belly (abdomen) and your spine. The pain may occur over a short time (acute), or it may be long-term or come back often (chronic). It may be mild or very bad. Pain in this area can be caused by many different things.  Follow these instructions at home:  Three cups showing dark yellow, yellow, and pale yellow pee.  Drink enough fluid to keep your pee (urine) pale yellow.  Rest as told by your doctor.  Take over-the-counter and prescription medicines only as told by your doctor.  Keep a journal to keep track of:  What has caused your flank pain.  What has made your flank pain feel better.  Keep all follow-up visits.  Contact a doctor if:  Medicine does not help your pain.  You have new symptoms.  Your pain gets worse.  Your symptoms last longer than 2–3 days.  You have trouble peeing.  You are peeing more often than normal.  Get help right away if:  You have trouble breathing.  You are short of breath.  Your belly hurts, or it is swollen or red.  You feel like you may vomit (nauseous).  You vomit.  You feel faint, or you faint.  You have blood in your pee.  You have flank pain and a fever.  These symptoms may be an emergency. Get help right away. Call your local emergency services (911 in the [place]  Do not wait to see if the symptoms will go away.  Do not drive yourself to the hospital.  Summary  Flank pain is pain in your side. The flank is the area of your side between your upper belly (abdomen) and your spine.  Flank pain may occur over a short time (acute), or it may be long-term or come back often (chronic). It may be mild or very bad.  Pain in this area can be caused by many different things.  Contact your doctor if your symptoms get worse or last longer than 2–3 days.

## 2025-01-19 NOTE — ED PROVIDER NOTE - PATIENT PORTAL LINK FT
You can access the FollowMyHealth Patient Portal offered by [facility] by registering at the following website: [email or web address]. By joining [facility]’s FollowMyHealth portal, you will also be able to view your health information using other applications (apps) compatible with our system.

## 2025-01-19 NOTE — ED PROVIDER NOTE - CLINICAL SUMMARY MEDICAL DECISION MAKING FREE TEXT BOX
pt c/o R flank pain, states that feels like prior kidney stones, well appearing, nad, has not taken any meds for symptoms, ua neg, labs at baseline ckd, ct w/b/l kidney stones but no stones in uvj, distended gb noted but pt w/o any abd pain or tend on exam - no clinical concern for acute cholecystitis, to take prn tyl and f/u w/pmd, strict return precautions given, pt understands and agrees w/plan

## 2025-01-19 NOTE — ED ADULT NURSE NOTE - NSFALLHARMRISKINTERV_ED_ALL_ED

## 2025-01-19 NOTE — ED PROVIDER NOTE - CPE EDP EYES NORM
[FreeTextEntry1] : PPH: 1IPP admission in 2018 for OCD, No SA/NSSIB\par Past meds: hydroxyzine\par Substance: denies\par Social: single, never dated, interested in women, lives with mom and brother, has yoselin tristan, graduated hs. attending [employer] unemployed, enjoys reading and video games (3-4hrs/day). Confucianism.\par \par IBS, ASD
normal...

## 2025-01-19 NOTE — ED ADULT TRIAGE NOTE - RESPIRATORY RATE (BREATHS/MIN)
Chief Complaint   Patient presents with    Physical     HPI: Dulce Nelson presents for her routine physical exam.  She does have labs for review.  She is feeling systemically well.     The patient came in with several medical concerns. She recently got over bronchitis but then developed a sinus infection. She had severe cold symptoms and the worst migraine, which led her to go to the ER. There, she was told she had fluid behind her ears and a sinus infection. She was given Augmentin, Flonase, Afrin, saline mist, and Sudafed. She finished the Augmentin and feels better now, though she still sneezes occasionally.    She saw a [facility] and gastroenterology who confirmed she didn't have H. pylori and prescribed laxatives for her abdominal bloating. She was also given Carafate but stopped taking it because pantoprazole and famotidine worked well for her. She takes iron supplements daily and sometimes takes an extra dose when she feels weak or lethargic.    She is on her third week of birth control pills.  She denies any concerns of STI exposure.  She would like to continue on her oral contraception.  She will be due for her first Pap smear at the beginning of next year in January 2025. She has had a pelvic exams and an internal ultrasound in the past.      She has been taking Seroquel XR 50 mg extended release 100 mg. Initially, it helped her sleep, but later it occasionally caused insomnia, so she switched to taking it in the morning. This change helped with feeling lethargic and tired, but she still feels short-tempered. She hasn't noticed any other major depressive symptoms.    She hasn't been to the dentist in the past year and hasn't had an eye exam in a while. She had corrective eye surgery for strabismus. She has declined the flu shot and has noticed a lower pulse rate since starting Seroquel.    She often has an itchy scalp with large flakes and scabby bumps from scratching. This happens all year round,  not just in certain seasons. Weekly scalp treatments have helped temporarily, and she has used a minty shampoo but found it too strong. The itching gets worse before and after showering, and some shampoos make her scalp sting.    FAMILY HISTORY  - 3 family members with thyroid problems   LMP: Patient's last menstrual period was 10/20/2024 (approximate).; Previous Paps never performed     Exercise: No specific routine  Diet: General Diet  Sexually Active/Partner: Male partners  STD Concerns: Denies concern    Review Flowsheet  More data exists         9/11/2024   PHQ 2/9 Score   Adult PHQ 2 Score 2   Adult PHQ 2 Interpretation No further screening needed   Little interest or pleasure in activity? Several days   Feeling down, depressed or hopeless? Several days   Adult PHQ 9 Score 11   Adult PHQ 9 Interpretation Moderate Depression   Trouble falling or staying asleep or sleeping all the time? More than half the days   Feeling tired or having little energy? Nearly every day   Poor appetite or overeating? Nearly every day   Feeling bad about yourself or that you are a failure or have let yourself or family down? Several days   Trouble concentrating on things such as reading the newspaper or watching TV? Not at all   Moving or speaking slowly that other people have noticed or the opposite - being so fidgety or restless that you have been moving around a lot more than usual? Not at all   Thoughts that you would be better off dead or of hurting yourself in some way? Not at all   If you reported any problems, how difficult have these problems made it to do your work, take care of things at home, or get along with other people? Somewhat difficult      Details                     Health Maintenance   Topic Date Due    Annual Physical (ages 3 - 21)  Never done    Chlamydia and Gonorrhea Screening (if sexually active)  Never done    Influenza Vaccine (1) 09/01/2024    COVID-19 Vaccine (1 - 2024-25 season) Never done     DTaP/Tdap/Td Vaccine (7 - Td or Tdap) 10/08/2024    Varicella Vaccine  Completed    Meningococcal Vaccine  Completed    Hepatitis B Vaccine  Completed    HPV Vaccine  Completed    Pneumococcal Vaccine 0-64  Aged Out       Patient Active Problem List    Diagnosis Date Noted    Gastroesophageal reflux disease without esophagitis 07/16/2024     Priority: Low    Moderate recurrent major depression (CMD) 07/16/2024     Priority: Low    Chronic back pain 07/16/2024     Priority: Low    Anxiety disorder of childhood or adolescence 07/04/2021     Priority: Low    Sleep disturbance 07/04/2021     Priority: Low    JUAN JOSE (generalized anxiety disorder) 07/04/2021     Priority: Low    OCD (obsessive compulsive disorder) 07/01/2021     Priority: Low    Attention deficit hyperactivity disorder (ADHD), combined type 06/30/2021     Priority: Low     Patient with diagnosed with ADHD combined type by [facility] around July 4, 2021-      Reactive depression 06/30/2021     Priority: Low       Past Medical History:   Diagnosis Date    ADHD     Depression     MDD    Esotropia, alternating     Generalized anxiety disorder     OCD (obsessive compulsive disorder)     PTSD (post-traumatic stress disorder)     PTSD (post-traumatic stress disorder)     Sleep disturbance     Stomach disorder        Family History   Problem Relation Age of Onset    Other Father         GI    ADHD/ADD Father     Obsesive Compulsive Disorder Father     Cataracts Sister     Hypertension Maternal Grandmother     Anxiety disorder Maternal Grandmother     Depression Maternal Grandmother     Hypertension Paternal Grandmother     Thyroid Paternal Grandmother     Stroke Paternal Grandfather     Cancer Paternal Grandfather         lung    ADHD/ADD Other         cousins    ADHD/ADD Maternal Aunt     ADHD/ADD Maternal Uncle     Bipolar disorder Paternal Aunt          Current Outpatient Medications   Medication Sig Dispense Refill    norgestimate-ethinyl  estradiol (Estarylla) 0.25-35 MG-MCG per tablet Take 1 tablet by mouth daily. 28 tablet 2    famotidine (PEPCID) 20 MG tablet Take 1 tablet by mouth nightly. 90 tablet 0    sucralfate (CARAFATE) 1 g tablet Take 1 g by mouth 4 times daily.      pantoprazole (PROTONIX) 40 MG tablet Take 1 tablet by mouth daily. 90 tablet 1    QUEtiapine (SEROquel XR) 50 MG 24 hr tablet Take 2 tablets by mouth nightly. 180 tablet 0    FeroSul 325 (65 Fe) MG tablet Take 1 tablet by mouth daily.       No current facility-administered medications for this visit.       ALLERGIES:   Allergen Reactions    Atomoxetine Other (See Comments)     bleeding    Methylphenidate Hcl Other (See Comments)     Migraine headache, increase panic attacks       ROS:     GENERAL: Denies fever, chills, or appetite changes, weight changes    SKIN: Positive for dry, flaking, itching scalp denies any concerning lesions, rash or changes  EYES: Denies visual problems.  ENT: Denies hearing or nasal concerns; difficulty swallowing.  CARDIOVASCULAR:  Denies chest pain, palpitations, fatigue or peripheral edema.  RESPIRATORY:  Denies shortness of breath, cough.  BREASTS: Denies masses or other breast concerns.  GI: Denies abdominal pain, nausea, vomiting, diarrhea, constipation, bloody stools, tarry stools.  : Denies she urinary concerns, no vaginal discharge, odor, pain with intercourse.    GYN: Periods present,.  Uses DANNIE for birth control. No history of sexually transmitted infections or concerns.   MUSCULOSKELETAL: Denies any abnormal joint or muscle pain.   NEUROLOGIC: Denies headache, dizziness, numbness, gait, seizure or memory problems.  PSYCHOLOGICAL: Denies changes in depression or anxiety.    DEPRESSION SCREEN: See nursing notes.    PHYSICAL EXAM:     Visit Vitals  /70 (BP Location: LUE - Left upper extremity, Patient Position: Sitting, Cuff Size: Regular)   Pulse 92   Resp 17   Ht 5' 6\" (1.676 m)   Wt 92.3 kg (203 lb 8 oz)   LMP 10/20/2024  (Approximate)   BMI 32.85 kg/m²                                                                                                                                                                                                                                                                                                                                                                                                                                                                                                                                                                                                                                                                                                                                                                                                                                                                                                                                                                                                                                                                                                                                                                                                                                                                                                                                                                                                                                                                                                                                                                                                                                                                                                                                                                                                                                                                                                                                                                                                                                                                                                                                                                                                               Gen: The patient appears well developed, well nourished, stated age, and in no distress  HEENT: Normal cephalic, Atraumatic.  PERRLA, EOMI. Tympanic membranes are pearly grey, external canals are clear bilaterally.  Normal nasal and oral mucosa, no tonsil enlargement.  NECK: supple, no lymphadenopathy, no thyromegaly, no JVD   LUNGS: Chest symmetric, lungs are clear to auscultation. There are no wheezes, rales or rhonchi noted. No CVA tenderness to percussion bilaterally.  HEART: Regular rate and rhythm; S1,S2, no murmur  ABDOMEN:  Soft, nontender, no masses, no hepatosplenomegaly, no distension, bowel sounds are normoactive.   PELVIC EXAM: Patient will return for Pap in January  BREAST: Declines  EXTREMITIES: Radial, post-tibial and dorsalis pedis pulses equal and symmetrical bilaterally; upper and lower extremities without erythema, edema, crepitus or deformity bilaterally.  NEUROLOGICAL: CN 2-12 grossly intact, DTR's 2+/4+ bilaterally and symmetrical, normal strength.  SKIN: warm, moist, intact without erythema, no abnormal rash or lesions on visible skin  PSYCHIATRIC:  Alert, good eye contact, mood and affect appropriate with thoughts connected. Does not appear to be responding to internal stimuli. Dress and appearance appropriate. Fluent speech.  LABS:      Laboratory Studies:  - Cholesterol: 222 (slightly elevated)  - LDL: 119 (slightly elevated)  - Thyroid function: borderline underactive  - Blood sugar: normal  - Electrolytes: normal  - Bilirubin: normal  - Liver enzymes: normal  - Blood count: normal  - Urinalysis: many skin cells, squamous epithelial, few red blood cells, no bacteria  - Iron saturation: slightly low      Assessment Plan:        1. Sinus Infection:-improving  - Recently completed a  course of amoxicillin (diagnosed on October 26)  - Used Flonase, Afrin (oxymetazoline), saline mist, and Sudafed  - Reports feeling better with occasional sneezing, no significant symptoms    2. Gastroesophageal Reflux Disease (GERD):-Stable  - Taking pantoprazole 40 mg and famotidine 20 mg as needed, effectively managing symptoms  - Discontinued Carafate  -Continue to follow-up with gastroenterology as needed    3. Constipation:  - Continue using milk of magnesia or MiraLAX as needed as recommended by GI  - Recent imaging confirmed significant constipation    4. Hypercholesterolemia:  - Cholesterol: 222 (slightly elevated)  - LDL: slightly above desired level  - Advised to reduce intake of butter, fried foods, and red meats    5.  Abnormal thyroid serum level versus hypothyroidism:  - Thyroid levels: borderline underactive  - Recheck thyroid levels in 2 to 3 months  - No medication prescribed at this time  -Reviewed possible causes of minimally low TSH levels such as stress, illness medications    6.  Mood disorder/JUAN JOSE/sleep disturbance:  - Taking Seroquel 100 mg extended-release, reports improved mood but some irritability  -Discussed and patient is interested in and increase dosage to 150 mg (take three 50 mg tablets)  - If increased dosage is too much, reduce and notify provider for prescription adjustment  -Patient does have an appointment to establish with behavioral health provider on November 21.  She is encouraged to keep that appointment as there are concerns with Seroquel causing her some weight gain, possible cholesterol level changes    7. Iron Deficiency:  - Iron saturation: slightly low  - Continue current iron supplementation from [facility]  -Continue on oral contraception to help decrease blood loss    8. Itchy Scalp:  - Possible eczema or psoriasis, fungal infection  - Prescription for ketoconazole shampoo (use every 2 to 3 days)  - Take Claritin during the day to alleviate itching  - If  ketoconazole shampoo does not provide relief, consider alternative treatment such as steroid combination    9.  Routine health exam     Health Maintenance:  - Schedule dental and eye appointments  - Conduct urinalysis today to recheck previous abnormal results (likely due to contamination and fasting)    Follow-up:  - Return in 3 months for a Pap smear      DEPRESSION ASSESSMENT/PLAN:  Referred to Behavioral Health and/or Psychiatry.  Adjustments made to Seroquel as patient does feel improved on mood stabilizer.  We did increase to 150 mg daily.  She does have an appointment with behavioral health provider later this month and she is encouraged to keep this appointment.    BMI ASSESSMENT/PLAN:  Body mass index is 32.85 kg/m².    BMI is in obese range.    40 minutes of physical activity a day, Caloric restriction, and Low carbohydrate diet       Pap smear is due in January. Discussed STD screening and will complete with Pap smear. Self breast exams reviewed how to complete monthly  Counseled regarding substance use ; Adequate hydration, moderation of caffeine, alcohol  Discussed healthy life style modifications, increase fruits, vegetables, fiber, decrease fats; Mediterranean diet offered as example of heart health option   30 min of moderate exercise, 5 times per week or 45 min of intensive exercise, 3 times per week, along with strengthening exercise.  Discussed calcium ( 1200 mg ) and  Vitamin D (800 IU, min ) daily   Discussed regular eye exams, dental visits.   Self skin checks. Using sunscreen SPF > 50   Immunizations as per nurse notes    Tetanus updated today; will consider influenza vaccination with next visit.     Will have her follow up in January for Pap smear.       Next CPE in 1 year.       All questions and concerns were addressed.     patient verbalized understanding and agrees with the plan above. All questions were answered.           18

## 2025-01-19 NOTE — ED PROVIDER NOTE - OBJECTIVE STATEMENT
The pt is a 83 y/o M, who presents to ED c/o R flank pain x 2 d. Pt states pain is sharp, radiates to abd, 7/10, has not taken any pain meds, states "I've had stones before and this feels like it", some urinary frequency. Denies hematuria, dysuria, n/v/d, fevers, chills, abd pain, cp, sob.

## 2025-01-19 NOTE — ED ADULT NURSE NOTE - OBJECTIVE STATEMENT
Pt is a 85y/o M w/ PMHx CKD, HTN, HLD, recent back surgery, venous stasis, kidney stones, presenting to the ED w/ c/o of bilat flank pain (worse on R side than L), urinary frequency since Friday. Denies fevers/chills, dysuria, hematuria, N/V/D. Pt A/Ox3, speaking in clear/complete sentences. Respirations easy/even and unlabored on RA. Pt ambulates independently w/ cane. Pt placed in gown, resting comfortably in stretcher, no acute distress. Pending ED provider eval. EKG completed in triage.

## 2025-01-24 ENCOUNTER — APPOINTMENT (OUTPATIENT)
Dept: GASTROENTEROLOGY | Facility: CLINIC | Age: 85
End: 2025-01-24

## 2025-01-28 ENCOUNTER — NON-APPOINTMENT (OUTPATIENT)
Age: 85
End: 2025-01-28

## 2025-01-29 RX ORDER — BACLOFEN 10 MG/1
10 TABLET ORAL TWICE DAILY
Qty: 60 | Refills: 0 | Status: ACTIVE | COMMUNITY
Start: 2025-01-29 | End: 1900-01-01

## 2025-02-03 ENCOUNTER — NON-APPOINTMENT (OUTPATIENT)
Age: 85
End: 2025-02-03

## 2025-02-04 ENCOUNTER — RESULT REVIEW (OUTPATIENT)
Age: 85
End: 2025-02-04

## 2025-02-05 ENCOUNTER — TRANSCRIPTION ENCOUNTER (OUTPATIENT)
Age: 85
End: 2025-02-05

## 2025-02-06 ENCOUNTER — APPOINTMENT (OUTPATIENT)
Dept: SPINE | Facility: CLINIC | Age: 85
End: 2025-02-06
Payer: MEDICARE

## 2025-02-06 DIAGNOSIS — Z98.1 ARTHRODESIS STATUS: ICD-10-CM

## 2025-02-06 PROCEDURE — 99213 OFFICE O/P EST LOW 20 MIN: CPT | Mod: 2W

## 2025-02-06 RX ORDER — BACLOFEN 10 MG/1
10 TABLET ORAL
Qty: 60 | Refills: 0 | Status: ACTIVE | COMMUNITY
Start: 2025-02-06 | End: 1900-01-01

## 2025-02-10 ENCOUNTER — APPOINTMENT (OUTPATIENT)
Dept: ORTHOPEDIC SURGERY | Facility: CLINIC | Age: 85
End: 2025-02-10

## 2025-02-19 RX ORDER — BACLOFEN 10 MG/1
10 TABLET ORAL
Qty: 60 | Refills: 0 | Status: ACTIVE | COMMUNITY
Start: 2025-02-19 | End: 1900-01-01

## 2025-02-24 ENCOUNTER — NON-APPOINTMENT (OUTPATIENT)
Age: 85
End: 2025-02-24

## 2025-03-13 ENCOUNTER — APPOINTMENT (OUTPATIENT)
Dept: GASTROENTEROLOGY | Facility: CLINIC | Age: 85
End: 2025-03-13

## 2025-03-18 RX ORDER — PANTOPRAZOLE 40 MG/1
40 TABLET, DELAYED RELEASE ORAL DAILY
Qty: 90 | Refills: 1 | Status: ACTIVE | COMMUNITY
Start: 2025-03-18 | End: 1900-01-01

## 2025-04-02 ENCOUNTER — NON-APPOINTMENT (OUTPATIENT)
Age: 85
End: 2025-04-02

## 2025-04-16 ENCOUNTER — APPOINTMENT (OUTPATIENT)
Dept: NEPHROLOGY | Facility: CLINIC | Age: 85
End: 2025-04-16
Payer: MEDICARE

## 2025-04-16 VITALS — DIASTOLIC BLOOD PRESSURE: 69 MMHG | HEART RATE: 86 BPM | SYSTOLIC BLOOD PRESSURE: 112 MMHG

## 2025-04-16 DIAGNOSIS — I10 ESSENTIAL (PRIMARY) HYPERTENSION: ICD-10-CM

## 2025-04-16 DIAGNOSIS — N18.4 CHRONIC KIDNEY DISEASE, STAGE 4 (SEVERE): ICD-10-CM

## 2025-04-16 DIAGNOSIS — M10.9 GOUT, UNSPECIFIED: ICD-10-CM

## 2025-04-16 DIAGNOSIS — R60.0 LOCALIZED EDEMA: ICD-10-CM

## 2025-04-16 DIAGNOSIS — D64.9 ANEMIA, UNSPECIFIED: ICD-10-CM

## 2025-04-16 PROCEDURE — 76775 US EXAM ABDO BACK WALL LIM: CPT | Mod: 59

## 2025-04-16 PROCEDURE — 99214 OFFICE O/P EST MOD 30 MIN: CPT

## 2025-04-16 RX ORDER — TORSEMIDE 20 MG/1
20 TABLET ORAL DAILY
Qty: 180 | Refills: 0 | Status: ACTIVE | COMMUNITY

## 2025-04-16 RX ORDER — TAMSULOSIN HYDROCHLORIDE 0.4 MG/1
0.4 CAPSULE ORAL
Qty: 90 | Refills: 1 | Status: ACTIVE | COMMUNITY
Start: 2025-04-16 | End: 1900-01-01

## 2025-04-16 RX ORDER — BUPRENORPHINE AND NALOXONE 2; .5 MG/1; MG/1
2-0.5 TABLET SUBLINGUAL
Refills: 0 | Status: ACTIVE | COMMUNITY

## 2025-04-16 RX ORDER — ATORVASTATIN CALCIUM 80 MG/1
80 TABLET, FILM COATED ORAL
Refills: 0 | Status: ACTIVE | COMMUNITY

## 2025-04-17 DIAGNOSIS — N20.0 CALCULUS OF KIDNEY: ICD-10-CM

## 2025-04-17 LAB
ALBUMIN SERPL ELPH-MCNC: 4.2 G/DL
ANION GAP SERPL CALC-SCNC: 11 MMOL/L
APPEARANCE: CLEAR
BACTERIA: NEGATIVE /HPF
BILIRUBIN URINE: NEGATIVE
BLOOD URINE: NEGATIVE
BUN SERPL-MCNC: 44 MG/DL
CALCIUM SERPL-MCNC: 10.4 MG/DL
CALCIUM SERPL-MCNC: 10.4 MG/DL
CAST: 2 /LPF
CHLORIDE SERPL-SCNC: 106 MMOL/L
CO2 SERPL-SCNC: 26 MMOL/L
COLOR: YELLOW
CREAT SERPL-MCNC: 1.82 MG/DL
CREAT SPEC-SCNC: 27 MG/DL
CYSTATIN C SERPL-MCNC: 2.6 MG/L
EGFRCR SERPLBLD CKD-EPI 2021: 36 ML/MIN/1.73M2
EPITHELIAL CELLS: 0 /HPF
GFR/BSA.PRED SERPLBLD CYS-BASED-ARV: 20 ML/MIN/1.73M2
GLUCOSE QUALITATIVE U: NEGATIVE MG/DL
GLUCOSE SERPL-MCNC: 90 MG/DL
HCT VFR BLD CALC: 41.3 %
HGB BLD-MCNC: 12.7 G/DL
KETONES URINE: NEGATIVE MG/DL
LEUKOCYTE ESTERASE URINE: NEGATIVE
MCHC RBC-ENTMCNC: 29.4 PG
MCHC RBC-ENTMCNC: 30.8 G/DL
MCV RBC AUTO: 95.6 FL
MICROALBUMIN 24H UR DL<=1MG/L-MCNC: 9.4 MG/DL
MICROALBUMIN/CREAT 24H UR-RTO: 347 MG/G
MICROSCOPIC-UA: NORMAL
NITRITE URINE: NEGATIVE
PARATHYROID HORMONE INTACT: 235 PG/ML
PH URINE: 6
PHOSPHATE SERPL-MCNC: 3.4 MG/DL
PLATELET # BLD AUTO: 215 K/UL
POTASSIUM SERPL-SCNC: 4.5 MMOL/L
PROTEIN URINE: NORMAL MG/DL
RBC # BLD: 4.32 M/UL
RBC # FLD: 16.3 %
RED BLOOD CELLS URINE: 0 /HPF
SODIUM SERPL-SCNC: 142 MMOL/L
SPECIFIC GRAVITY URINE: 1.01
URATE SERPL-MCNC: 9 MG/DL
UROBILINOGEN URINE: 0.2 MG/DL
WBC # FLD AUTO: 7.12 K/UL
WHITE BLOOD CELLS URINE: 0 /HPF

## 2025-04-17 RX ORDER — LOSARTAN POTASSIUM 50 MG/1
50 TABLET, FILM COATED ORAL DAILY
Qty: 90 | Refills: 3 | Status: ACTIVE | COMMUNITY
Start: 2025-04-17 | End: 1900-01-01

## 2025-04-23 ENCOUNTER — APPOINTMENT (OUTPATIENT)
Dept: CT IMAGING | Facility: CLINIC | Age: 85
End: 2025-04-23

## 2025-04-24 PROBLEM — N20.0 RIGHT NEPHROLITHIASIS: Status: ACTIVE | Noted: 2025-04-16

## 2025-04-25 RX ORDER — TRAMADOL HYDROCHLORIDE 50 MG/1
50 TABLET, COATED ORAL
Qty: 21 | Refills: 0 | Status: ACTIVE | COMMUNITY
Start: 2025-04-24 | End: 1900-01-01

## 2025-05-02 ENCOUNTER — APPOINTMENT (OUTPATIENT)
Dept: UROLOGY | Facility: CLINIC | Age: 85
End: 2025-05-02
Payer: MEDICARE

## 2025-05-02 VITALS
DIASTOLIC BLOOD PRESSURE: 76 MMHG | HEIGHT: 69 IN | TEMPERATURE: 97.4 F | HEART RATE: 78 BPM | BODY MASS INDEX: 22.22 KG/M2 | WEIGHT: 150 LBS | OXYGEN SATURATION: 94 % | SYSTOLIC BLOOD PRESSURE: 115 MMHG

## 2025-05-02 PROCEDURE — 99213 OFFICE O/P EST LOW 20 MIN: CPT

## 2025-05-02 PROCEDURE — G2211 COMPLEX E/M VISIT ADD ON: CPT

## 2025-05-12 ENCOUNTER — APPOINTMENT (OUTPATIENT)
Dept: UROLOGY | Facility: CLINIC | Age: 85
End: 2025-05-12

## 2025-07-09 ENCOUNTER — APPOINTMENT (OUTPATIENT)
Dept: GASTROENTEROLOGY | Facility: CLINIC | Age: 85
End: 2025-07-09

## 2025-08-08 ENCOUNTER — APPOINTMENT (OUTPATIENT)
Dept: NEPHROLOGY | Facility: CLINIC | Age: 85
End: 2025-08-08

## 2025-08-11 ENCOUNTER — RESULT REVIEW (OUTPATIENT)
Age: 85
End: 2025-08-11

## 2025-08-12 ENCOUNTER — TRANSCRIPTION ENCOUNTER (OUTPATIENT)
Age: 85
End: 2025-08-12

## (undated) DEVICE — PACK SPINE

## (undated) DEVICE — PREP CHLORAPREP HI-LITE ORANGE 26ML

## (undated) DEVICE — FOLEY TRAY 16FR 5CC LF UMETER CLOSED

## (undated) DEVICE — STAPLER SKIN PROXIMATE

## (undated) DEVICE — DRSG AQUACEL 3.5 X 14"

## (undated) DEVICE — DRSG TELFA 3 X 8

## (undated) DEVICE — DRAIN JACKSON PRATT 10MM FLAT 3/4 NO TROCAR

## (undated) DEVICE — NEPTUNE II 4-PORT MANIFOLD

## (undated) DEVICE — MIDAS REX MR8 MATCH HEAD FLUTED LG BORE 3MM X 14CM

## (undated) DEVICE — DRAPE INSTRUMENT POUCH 6.75" X 11"

## (undated) DEVICE — STRYKER INSTRUMENT BATTERY

## (undated) DEVICE — DRAPE FOR 2 TIER TABLE W/ 8" BACK 72" LONG

## (undated) DEVICE — Device

## (undated) DEVICE — MARKING PEN W RULER

## (undated) DEVICE — DRAIN RESERVOIR FOR JACKSON PRATT 100CC CARDINAL

## (undated) DEVICE — DRAPE 3/4 SHEET 52X76"

## (undated) DEVICE — NDL SPINAL 18G X 3.5" (PINK)

## (undated) DEVICE — DRAPE GENERAL ENDOSCOPY

## (undated) DEVICE — ELCTR AQUAMANTYS BIPOLAR SEALER 6.0

## (undated) DEVICE — SUT MONOCRYL 3-0 18" PS-2 UNDYED

## (undated) DEVICE — SUT ETHILON 3-0 18" PS-1

## (undated) DEVICE — NDL HYPO SAFE 22G X 1.5" (BLACK)

## (undated) DEVICE — CANNULA STRYKER K2M CEMENT DISP

## (undated) DEVICE — SUT VICRYL PLUS 2-0 18" CT-2 (POP-OFF)

## (undated) DEVICE — WOUND IRR SURGIPHOR

## (undated) DEVICE — GLV 8 PROTEXIS ORTHO (CREAM)

## (undated) DEVICE — GLV 8 PROTEXIS (WHITE)

## (undated) DEVICE — DRAPE LIGHT HANDLE COVER (GREEN)

## (undated) DEVICE — DRAPE TOP SHEET 53" X 101"

## (undated) DEVICE — SUT VICRYL PLUS 0 36" CT-1

## (undated) DEVICE — MIDAS REX MR8 MATCH HEAD FLUTED SM BORE 3MM X 10CM

## (undated) DEVICE — DRSG DERMABOND PRINEO 22CM

## (undated) DEVICE — STRYKER BONE MILL BLADE FINE 3.2MM

## (undated) DEVICE — ELCTR STRYKER NEPTUNE SMOKE EVACUATION PENCIL (GREEN)

## (undated) DEVICE — SPONGE SURGICAL STRIP 1/4 X 6"

## (undated) DEVICE — POLISHER OR CAUTERY TIP

## (undated) DEVICE — DRAPE 1/2 SHEET 40X57"

## (undated) DEVICE — DRSG TEGADERM 4X4.75"

## (undated) DEVICE — GLV 7.5 PROTEXIS (WHITE)

## (undated) DEVICE — DRAPE IOBAN 33" X 23"

## (undated) DEVICE — DRAIN JACKSON PRATT 3 SPRING RESERVOIR W 10FR PVC DRAIN